# Patient Record
Sex: FEMALE | Race: WHITE | Employment: OTHER | URBAN - METROPOLITAN AREA
[De-identification: names, ages, dates, MRNs, and addresses within clinical notes are randomized per-mention and may not be internally consistent; named-entity substitution may affect disease eponyms.]

---

## 2020-07-27 ENCOUNTER — HOSPITAL ENCOUNTER (EMERGENCY)
Age: 75
Discharge: ANOTHER HOSPITAL | End: 2020-07-27
Attending: EMERGENCY MEDICINE
Payer: MEDICARE

## 2020-07-27 ENCOUNTER — APPOINTMENT (OUTPATIENT)
Dept: CT IMAGING | Age: 75
End: 2020-07-27
Payer: MEDICARE

## 2020-07-27 ENCOUNTER — APPOINTMENT (OUTPATIENT)
Dept: GENERAL RADIOLOGY | Age: 75
End: 2020-07-27
Payer: MEDICARE

## 2020-07-27 VITALS
OXYGEN SATURATION: 98 % | BODY MASS INDEX: 27.95 KG/M2 | TEMPERATURE: 98.1 F | RESPIRATION RATE: 9 BRPM | SYSTOLIC BLOOD PRESSURE: 179 MMHG | HEART RATE: 94 BPM | HEIGHT: 66 IN | DIASTOLIC BLOOD PRESSURE: 85 MMHG | WEIGHT: 173.9 LBS

## 2020-07-27 LAB
ALBUMIN SERPL-MCNC: 4.2 G/DL (ref 3.5–4.6)
ALP BLD-CCNC: 74 U/L (ref 40–130)
ALT SERPL-CCNC: 12 U/L (ref 0–33)
ANION GAP SERPL CALCULATED.3IONS-SCNC: 13 MEQ/L (ref 9–15)
AST SERPL-CCNC: 19 U/L (ref 0–35)
BILIRUB SERPL-MCNC: 0.4 MG/DL (ref 0.2–0.7)
BUN BLDV-MCNC: 13 MG/DL (ref 8–23)
CALCIUM SERPL-MCNC: 9.2 MG/DL (ref 8.5–9.9)
CHLORIDE BLD-SCNC: 104 MEQ/L (ref 95–107)
CO2: 25 MEQ/L (ref 20–31)
CREAT SERPL-MCNC: 0.47 MG/DL (ref 0.5–0.9)
EKG ATRIAL RATE: 80 BPM
EKG P AXIS: 33 DEGREES
EKG P-R INTERVAL: 150 MS
EKG Q-T INTERVAL: 386 MS
EKG QRS DURATION: 82 MS
EKG QTC CALCULATION (BAZETT): 445 MS
EKG R AXIS: -4 DEGREES
EKG T AXIS: 24 DEGREES
EKG VENTRICULAR RATE: 80 BPM
GFR AFRICAN AMERICAN: >60
GFR NON-AFRICAN AMERICAN: >60
GLOBULIN: 2.7 G/DL (ref 2.3–3.5)
GLUCOSE BLD-MCNC: 115 MG/DL (ref 70–99)
HCT VFR BLD CALC: 40.1 % (ref 37–47)
HEMOGLOBIN: 13.4 G/DL (ref 12–16)
INR BLD: 0.9
MCH RBC QN AUTO: 30.3 PG (ref 27–31.3)
MCHC RBC AUTO-ENTMCNC: 33.3 % (ref 33–37)
MCV RBC AUTO: 90.9 FL (ref 82–100)
PDW BLD-RTO: 13.4 % (ref 11.5–14.5)
PLATELET # BLD: 265 K/UL (ref 130–400)
POTASSIUM SERPL-SCNC: 3.6 MEQ/L (ref 3.4–4.9)
PROTHROMBIN TIME: 12.5 SEC (ref 12.3–14.9)
RBC # BLD: 4.41 M/UL (ref 4.2–5.4)
SODIUM BLD-SCNC: 142 MEQ/L (ref 135–144)
TOTAL PROTEIN: 6.9 G/DL (ref 6.3–8)
WBC # BLD: 6.4 K/UL (ref 4.8–10.8)

## 2020-07-27 PROCEDURE — 96374 THER/PROPH/DIAG INJ IV PUSH: CPT

## 2020-07-27 PROCEDURE — 85027 COMPLETE CBC AUTOMATED: CPT

## 2020-07-27 PROCEDURE — 96375 TX/PRO/DX INJ NEW DRUG ADDON: CPT

## 2020-07-27 PROCEDURE — 99291 CRITICAL CARE FIRST HOUR: CPT

## 2020-07-27 PROCEDURE — 93010 ELECTROCARDIOGRAM REPORT: CPT | Performed by: INTERNAL MEDICINE

## 2020-07-27 PROCEDURE — 93005 ELECTROCARDIOGRAM TRACING: CPT | Performed by: EMERGENCY MEDICINE

## 2020-07-27 PROCEDURE — 70450 CT HEAD/BRAIN W/O DYE: CPT

## 2020-07-27 PROCEDURE — 6360000002 HC RX W HCPCS: Performed by: EMERGENCY MEDICINE

## 2020-07-27 PROCEDURE — 36415 COLL VENOUS BLD VENIPUNCTURE: CPT

## 2020-07-27 PROCEDURE — 71045 X-RAY EXAM CHEST 1 VIEW: CPT

## 2020-07-27 PROCEDURE — 80053 COMPREHEN METABOLIC PANEL: CPT

## 2020-07-27 PROCEDURE — 85610 PROTHROMBIN TIME: CPT

## 2020-07-27 RX ORDER — FENTANYL CITRATE 50 UG/ML
50 INJECTION, SOLUTION INTRAMUSCULAR; INTRAVENOUS ONCE
Status: COMPLETED | OUTPATIENT
Start: 2020-07-27 | End: 2020-07-27

## 2020-07-27 RX ORDER — ONDANSETRON 2 MG/ML
4 INJECTION INTRAMUSCULAR; INTRAVENOUS ONCE
Status: COMPLETED | OUTPATIENT
Start: 2020-07-27 | End: 2020-07-27

## 2020-07-27 RX ADMIN — ONDANSETRON 4 MG: 2 INJECTION INTRAMUSCULAR; INTRAVENOUS at 08:37

## 2020-07-27 RX ADMIN — FENTANYL CITRATE 50 MCG: 50 INJECTION, SOLUTION INTRAMUSCULAR; INTRAVENOUS at 08:32

## 2020-07-27 ASSESSMENT — ENCOUNTER SYMPTOMS
SHORTNESS OF BREATH: 0
EYE DISCHARGE: 0
FACIAL SWELLING: 0
PHOTOPHOBIA: 0
RHINORRHEA: 0
ABDOMINAL DISTENTION: 0
VOMITING: 0
WHEEZING: 0
COLOR CHANGE: 0
ABDOMINAL PAIN: 0

## 2020-07-27 ASSESSMENT — PAIN SCALES - GENERAL: PAINLEVEL_OUTOF10: 10

## 2020-07-27 NOTE — ED NOTES
[facility] arrived to transport patient via helicopter  Ryan Sargent  at bedside with    All belongings given to Ryan Sargent  Only belongings going with patient is a ring on her left ring finger      Owen Mehta RN  07/27/20 1496

## 2020-07-27 NOTE — ED NOTES
Patient presents to the ER via  [address] after swimming this morning at the [facility] when she got out of the swimming pool she went blind and was unable to see. [facility] arrived and started to transport her to the ER when she started to become confused and was unable to give name and . Patient remains confused when she arrives to the ER. Patient is able to see shadowing and know that a bright light is being shined into her eyes. Patient is slow to respond and has trouble finding words. Patient is able to move exts but is unable to follow commands at times due to confusion. No slurred speech noted at time of triage.        Kay Ignacio, RN  [phone]

## 2020-07-27 NOTE — ED PROVIDER NOTES
[facility] ED  eMERGENCY dEPARTMENT eNCOUnter      Pt Name: Yulisa Pena  MRN: [id number]  [place] 1945  Date of evaluation: 7/27/2020  Provider: Kindra Santiago [address] [place] [address]       Chief Complaint   Patient presents with    Cerebrovascular Accident         HISTORY OF PRESENT ILLNESS   (Location/Symptom, Timing/Onset,Context/Setting, Quality, Duration, Modifying Factors, Severity)  Note limiting factors. Yulisa Pena is a 69 y.o. female who presents to the emergency department with a chief complaint of sudden visual loss after swimming with a friend at the [facility] center this morning upon climbing out of the pool. She has been exhibiting some confusion as well. She has no history of CVA. She denies headache, nausea, vomiting, or other complaints. She has no history of visual loss. HPI    NursingNotes were reviewed. REVIEW OF SYSTEMS    (2-9 systems for level 4, 10 or more for level 5)     Review of Systems   Constitutional: Negative for activity change and appetite change. HENT: Negative for congestion, facial swelling and rhinorrhea. Eyes: Positive for visual disturbance. Negative for photophobia and discharge. Respiratory: Negative for shortness of breath and wheezing. Cardiovascular: Negative for chest pain. Gastrointestinal: Negative for abdominal distention, abdominal pain and vomiting. Endocrine: Negative for polydipsia and polyphagia. Genitourinary: Negative for difficulty urinating, frequency, vaginal bleeding and vaginal discharge. Musculoskeletal: Negative for gait problem. Skin: Negative for color change. Allergic/Immunologic: Negative for immunocompromised state. Neurological: Negative for dizziness, weakness and light-headedness. Hematological: Negative for adenopathy. Psychiatric/Behavioral: Negative for behavioral problems. Except as noted above the remainder of the review of systems was reviewed and negative.        PAST MEDICAL HISTORY   No past medical history on file. SURGICALHISTORY     No past surgical history on file. CURRENT MEDICATIONS       Previous Medications    No medications on file       ALLERGIES     Patient has no known allergies. FAMILY HISTORY     No family history on file. SOCIAL HISTORY       Social History     Socioeconomic History    Marital status:      Spouse name: Not on file    Number of children: Not on file    Years of education: Not on file    Highest education level: Not on file   Occupational History    Not on file   Social Needs    Financial resource strain: Not on file    Food insecurity     Worry: Not on file     Inability: Not on file    Transportation needs     Medical: Not on file     Non-medical: Not on file   Tobacco Use    Smoking status: Not on file   Substance and Sexual Activity    Alcohol use: Not on file    Drug use: Not on file    Sexual activity: Not on file   Lifestyle    Physical activity     Days per week: Not on file     Minutes per session: Not on file    Stress: Not on file   Relationships    Social connections     Talks on phone: Not on file     Gets together: Not on file     Attends Druze service: Not on file     Active member of club or organization: Not on file     Attends meetings of clubs or organizations: Not on file     Relationship status: Not on file    Intimate partner violence     Fear of current or ex partner: Not on file     Emotionally abused: Not on file     Physically abused: Not on file     Forced sexual activity: Not on file   Other Topics Concern    Not on file   Social History Narrative    Not on file       SCREENINGS   NIH Stroke Scale  NIH Stroke Scale Assessed: Yes  Interval: Baseline  Level of Consciousness (1a. ): Alert  LOC Questions (1b. ):  Answers neither question correctly  LOC Commands (1c. ): Performs both tasks correctly  Best Gaze (2. ): Normal  Visual (3. ): (!) Bilateral hemianopia  Facial Palsy (4. ): is obviously frightened, but acting appropriately         DIAGNOSTIC RESULTS     EKG: All EKG's are interpreted by the Emergency Department Physician who either signs or Co-signsthis chart in the absence of a cardiologist.    Normal sinus rhythm at a normal rate of 80 bpm with no acute ST segment elevation or T wave inversion    RADIOLOGY:   Non-plain filmimages such as CT, Ultrasound and MRI are read by the radiologist. Plain radiographic images are visualized and preliminarily interpreted by the emergency physician with the below findings:    Patient has a large parieto-occipital bleed with a shift on CT per radiology/prelim read (Dr. Hay Bowers contacted us via telephone)    Interpretation per the Radiologist below, if available at the time ofthis note:    CT Head WO Contrast   Final Result      6.1 x 4.4 x 5.4 cm left posterior parietal and left occipital hemorrhage with surrounding vasogenic edema, with extension into left lateral ventricle. Mass effect and midline shift as discussed. Differential includes hemorrhagic infarct. Cannot exclude    underlying mass/malignancy. Prominence of tentorium suggests component of subarachnoid hemorrhage. Above findings discussed via telephone with Dr. Mandy Carpenter in the emergency department, at 8:04 AM, Monday, July 27, 2020. All CT scans at this facility use dose modulation, iterative reconstruction, and/or weight based dosing when appropriate to reduce radiation dose to as low as reasonably achievable.       XR CHEST PORTABLE    (Results Pending)     pcxr no acute findings per my read    ED BEDSIDE ULTRASOUND:   Performed by ED Physician - none    LABS:  Labs Reviewed   COMPREHENSIVE METABOLIC PANEL - Abnormal; Notable for the following components:       Result Value    Glucose 115 (*)     CREATININE 0.47 (*)     All other components within normal limits   CBC   PROTIME-INR       All other labs were within normal range or not returned as of this dictation. EMERGENCY DEPARTMENT COURSE and DIFFERENTIAL DIAGNOSIS/MDM:   Vitals:    Vitals:    07/27/20 0752 07/27/20 0810   BP: (!) 172/82 (!) 159/70   Pulse: 88 82   Resp: 14 14   Temp: 98.1 °F (36.7 °C)    TempSrc: Oral    SpO2: 99% 100%   Weight:  173 lb 14.4 oz (78.9 kg)   Height: 5' 6\" (1.676 m)      Dr. Valente Ivy is contacted upon patient's arrival and confirms that she is a tPA candidate. She is found to have a bleed on CT however and this is obviously not carried out. Dr. Valente Ivy is made aware and neurosurgeon is contacted. Per [facility] notes, patient takes a baby asa and synthroid. It is unknown when this was last taken. Serial neuro exams are performed and she remains awake and alert and currently unchanged. Her blood pressure is reasonable. Patient develops headache and is given fentanyl IV. Her  arrives at bedside and states that she hasn't taken asa in a long time. Dr. Andrea Sanchez does not have an operating room available until noon and asks for transfer, [facility] is contacted and she will fly. Serial neuro exams remain unchanged. MDM    CRITICAL CARE TIME   Total Critical Care time was 35 minutes, excluding separately reportableprocedures. There was a high probability of clinicallysignificant/life threatening deterioration in the patient's condition which required my urgent intervention. CONSULTS:  None    PROCEDURES:  Unless otherwise noted below, none     Procedures    FINAL IMPRESSION      1. Cerebrovascular accident (CVA), unspecified mechanism ([facility] [address]          DISPOSITION/PLAN   DISPOSITION        PATIENT REFERRED TO:  No follow-up provider specified.     DISCHARGE MEDICATIONS:  New Prescriptions    No medications on file          (Please note that portions of this note were completed with a voice recognition program.  Efforts were made to edit the dictations but occasionally words are mis-transcribed.)    Kris Sabillon,  (electronically signed)  Attending Emergency Physician

## 2020-07-27 NOTE — FLOWSHEET NOTE
All belongings including clothing and hair clips sent with  Delicia Gonzalez  One ring on left ring finger going with patient

## 2020-07-27 NOTE — PROGRESS NOTES
Spiritual Care Services     Summary of Visit:  Patient was being wheeled to CT at the time this  arrived. This  spoke with the ER doctor and was updated on patient. This  attempted to contact the patient's , but the call went to voice mail without identifying who the recipient was, so no message was left. Spiritual care to continue to follow the patient closely and make contact with . Spiritual Assessment/Intervention/Outcomes:    Encounter Summary  Services provided to[de-identified] Patient not available  Referral/Consult From[de-identified] Nursing Supervisor/Manager  Support System: Spouse, Friends/neighbors  Place of Holiness: Unknown  Continue Visiting: Yes     Crisis  Type: Stroke Alert, Code  Assessment: Coping  Intervention: Contacted support as requested per patient/family request  Who?:  Justyn Lamas  Why?: To inform   At Request Of: Doctor  Outcome: Did not respond                            Care Plan:    Continue to follow up with patient. Spiritual Care Services   Electronically signed by Khanh Escobar on 7/27/20 at 8:23 AM EDT     To reach a  for emotional and spiritual support, place an [facility] consult request.   If a  is needed immediately, dial 0 and ask to page the on-call .

## 2020-09-10 ENCOUNTER — HOSPITAL ENCOUNTER (OUTPATIENT)
Dept: SPEECH THERAPY | Age: 75
Setting detail: THERAPIES SERIES
Discharge: HOME | End: 2020-09-10
Payer: MEDICARE

## 2020-09-10 ENCOUNTER — HOSPITAL ENCOUNTER (OUTPATIENT)
Dept: OCCUPATIONAL THERAPY | Age: 75
Setting detail: THERAPIES SERIES
Discharge: HOME | End: 2020-09-10
Payer: MEDICARE

## 2020-09-10 PROCEDURE — 92523 SPEECH SOUND LANG COMPREHEN: CPT

## 2020-09-10 PROCEDURE — 97167 OT EVAL HIGH COMPLEX 60 MIN: CPT

## 2020-09-10 NOTE — PROGRESS NOTES
[x][facility]        [][facility] REHAB Dept       Outpatient Rehab Dept     [address] [place] [address] [address], [address]        [address] [facility], [address] [place] [address].     Phone: [phone]                   Phone: [phone]     Fax:  [phone]        Fax: [phone]      [facility]  Speech Language Pathology  Speech Language/Cognitive Evaluation        NAME:Nazanin Swain  : 1945 (75 y.o.)   MRN: [id number]  PATIENT DIAGNOSIS(ES): Alteration in cognition [R41.89]  Dysphagia, unspecified [R13.10]  Vision impairment [H54.7]  No chief complaint on file. There are no active problems to display for this patient. No past medical history on file. No past surgical history on file.   No Known Allergies    Date of Onset: 20  Ordering Physician: Dr. Silvia Herring    Date of Evaluation: 9/10/2020   Evaluating Therapist: ARRON Childers      SPEECH PATHOLOGY DIAGNOSIS:    Aphasia and Decreased Cognition    THERAPY RECOMMENDATIONS:   Therapy is recommended to improve:  Verbal expression  Auditory comprehension  Cognition                 MOTOR SPEECH    Oral Peripheral Examination   Adequate labial/lingual strength    Parameters of Speech Production  Respiration:    WFL    Articulation:    WFL    Resonance:    WFL    Quality:     WFL    Pitch:      WFL    Intensity:   WFL    Fluency:    Intact    Prosody   Intact       RECEPTIVE LANGUAGE    Comprehension of Yes/No Questions:             MTDDA subtest: 70% accuracy  Inconsistent  Perseveration    Process Simple Verbal Commands:   Incomplete  Inconsistent  Comment: 40% acc    Process Intermediate Verbal Commands:   Unable    Process Complex Verbal Commands:   Unable    Comprehension of Conversation:     Incomplete  Latent  Inconsistent  Comment: required mod-max cues to comprehend conversation    Comprehension of single words: 1. Pt will improve her Expressive Language Abilities to a phrase/sentence level for effective communication with familiar and unfamiliar communication partners so they may functionally communicate and express safety/medical concerns. 2. Pt will improve her Receptive Language abilities to a 2 step level for comprehension of conversation and safety directions with familiar and unfamiliar communication partners. Short Term Goals:  Pt to be seen 2x/wk for 6 weeks weeks. 1. Pt will answer mod. level yes/no questions with 80% accuracy with min cues to assist the caregiver in obtaining important information regarding the patient's personal, medical, and safety needs. 2.Pt will follow 1-2 step directions given orally with 70% accuracy with min cues to increase the pt's ability to follow directions provided by caregivers for safe follow through with ADLs. 3.Pt will complete confrontational naming tasks with 70% accuracy with mild verbal cues to help the patient express his/her basic wants and needs. 4. Pt will name 10/10 items in a concrete category with min verbal cues to promote semantic organization, neuroplasticity, and the efficiency of word finding for expression of the patient's wants, needs, feelings, and ideas. 5. Pt will be educated on word-finding strategies, and use them in structured and unstructured tasks in 60% of given opportunities with mild verbal cues to help the pt express his/her personal, safety, and medical needs in the presence of language deficits. 6. Pt will complete further cognitive testing as needed to guide POC      Prognosis for improvements:  Good family support/communication and motivation    Pain Assessment:  Initial Assessment:  Patient denies pain. Re-assessment:  Patient denies pain. Patient stated goals: To be as independent as possible     Education:  Patient. .  Reinforcement of recommendations is needed. Family member.  , who gives verbal understanding of

## 2020-09-10 NOTE — PROGRESS NOTES
[x] [address]:       [address] [place], [place] [address]Rahul Crane  Ph: [phone]   Fax: [phone] [] [address] [facility] [address]:  [address] [facility] [address] [facility], [address]   Ph: [phone]  Fax: [phone]       OCCUPATIONAL THERAPY EVALUATION     Evaluation Date:  9/10/2020      OT Individual Minutes  Time In: 3838  Time Out: 1505  Minutes: 60    Patient Name:Nazanin Stout   Gender: female   YOB: 1945         MRN: [id number]     Physician: Referring Practitioner: Dr. Sahra Feldman MD  Diagnosis: Diagnosis:    L intracranial hemorrhage, R weakness, R neglect, R vision impairment   Treating diagnosis: R29.898 Other symptoms and signs involving the musculoskeletal systems (decreased  strength, FM, coordination of RUE), R27.8 Other lack of coordination and R44.9 Unspecified symptoms and signs involving general sensations and perceptions                 Referral Date:  9/3/2020          Onset Date: Onset Date: 07/27/20    PMH:  There is no problem list on file for this patient.     Per Care everywhere, pt with the following PMH  Fourth cranial nerve palsy, right 06/22/2020   History of basal cell carcinoma (BCC) 06/16/2020   Abnormality of gait 02/26/2020   Gastroesophageal reflux disease without esophagitis 01/10/2020   Last Assessment & Plan:     Assessment: diet controlled      Primary osteoarthritis of left knee 04/18/2019   Primary osteoarthritis of both knees 07/05/2018   Basal cell carcinoma of eyebrow 12/13/2017   Combined forms of age-related cataract of left eye 04/03/2017   Status post corneal transplant- DSAEK OD 04/03/2017   Pseudophakia of right eye 01/18/2017   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 11/15/2016   Pinguecula 10/04/2016   Conjunctival pigmentations of right eye 10/04/2016   Fuchs' corneal dystrophy 07/28/2015   Posterior vitreous detachment of right eye 07/28/2015   Posterior vitreous detachment of left eye 07/28/2015   Inflamed seborrheic keratosis 05/19/2015   Stiffness of joint, not elsewhere classified, forearm 07/17/2014   Closed Colles' fracture 06/17/2014   Stiffness of joint, not elsewhere classified, hand 06/17/2014   Incontinence of urine 10/05/2012   Prolapse of female pelvic organs 10/05/2012   Vaginal ulceration 10/05/2012   Blepharochalasis 09/20/2010   Other psoriasis 05/04/2010   Actinic keratosis 05/04/2010   Unspecified Vitamin D Deficiency 11/24/2009   Contact dermatitis and other eczema, due to unspecified cause 01/13/2009   Other seborrheic keratosis 04/22/2008   Mixed hyperlipidemia 11/01/2005   Acquired hypothyroidism 10/30/2002   Last Assessment & Plan:     Assessment: controlled on PO synthroid. Most recent TSH: 9/16/2019 (0.169)     Disturbance of skin sensation        Per care everywhere,   Surgery Date Site/Laterality Comments   EXERCISE STRESS WO IMAGING 1/1/1999 - 12/31/1999   Normal.     REV UPPER EYELID W EXCESS SKIN 9/21/10 Bilateral  Bilateral Performed by Amharic Ohm at [facility] 1998 FAT PAD 9/21/10 Bilateral  Bilateral Performed by Amharic Ohm at [address]   Left left arm surgery, [facility] - Extremity Wrist     TUBAL LIGATION HX          CORNEAL TRNSPL, ENDOTHELIAL 11/10/2016 Right DSAEK (Endothelial Keratoplasty) OD by Dr. Lyndsey Yu 01/10/2017 Right Cataract Extraction with PC IOL OD by Dr. Vesna Luna 04/18/2019 Left Total left knee replacement, Arthroscopy     TOTAL KNEE REPLACEMENT 01/16/2020 Right Total Right Knee replacement, arthroscopy         No past medical history on file. No past surgical history on file. No Known Allergies   Bee allergies per pt report     Diagnostic imaging: Physician interpretation of imaging tests reviewed. Medications:  No current outpatient medications on file.  Pt has list. See chart for copy of or Pain/ Chief complaint:  Pt was swimming at [facility] center at this facility and had difficulties with vision, speaking, and getting out of pool. Pt taken to [facility], then transported to [facility] in [place]. Pt in ICU, had craniectomy, then transferred to acute rehab. Current Functional Limitations Per Patient Report:   Orientation: person   Communication: Pt with aphasia. Hearing: No concerns   Perception: NT on this date d/t time constraint    Vision:  visual field cut right side per pt and pt sister report             Feeding: Pt on dysphagia level 3 diet , family cutting food. Pt using hands to feed self. Grooming: Pt with set up with brushing teeth. Pt not able to comb hair. Sister helping wash pt face. Bathing: Pt able to wash upper body. Assist to wash/dry feet. UE dressing: Pt with Mod A, able to pull over trunk once BUE threaded. Pt able to doff IND'ly. LE dressing: Assist to thread underpants/pants. Pt able to pull up. Sister dons socks, pt able to doff. Sister ties shoes. Toileting: SBA using 3:1 commomde on main level. Toilet transfer: SBA with VC  Tub/Shower transfer: SBA with VC using shower chair    Cooking:  cooking cleaning now. Cleaning:  cooking and cleaning now. Laundry:  completing now. Sleep: No concerns reported     Medication management:      Patient goal for therapy: \"Do what I can. \"       OBSERVATION:   Pt donning helmet. No acute signs of distress. Pt in w/c. Pt with atypical movement pattern in RUE. OBJECTIVE FINDINGS    Hand Dominance: right, pt now using L hand for all activitis.         Upper Extremity Strength and Range of Motion      Right  Left    MMT A P Norm  A P MMT   Shoulder          Flexion 3+/5 110° NT 0-180 WFL NT 4/5   Abduction 3+/5 WFL NT 0-180 WFL NT 4/5   Internal Rotation NT/5 WFL NT 0-80 WFL NT NT/5   External Rotation NT/5 WFL NT 0-60 WFL NT NT/5   Elbow          Flexion 4/5 WFL NT 0-150 WFL NT 5/5 Extension 4/5 WFL -0 WFL NT 5/5   Pronation NT/5 WFL NT 0-80 WFL NT NT/5   Supination NT/5 WFL NT 0-80 WFL NT NT/5   Wrist          Flexion 4/5 WFL NT 0-70 WFL NT 5/5   Extension  4/5 WFL NT 0-60 WFL NT 5/5   Ulnar deviation NT/5 WFL NT 0-30 WFL NT NT/5   Radial Deviation  NT/5 WFL NT 0-20 WFL NT NT/5   Comments: Pt with increased time and effort with RUE to move through all planes.  Pt required verbal cues with visual demo        Hand Range of Motion      Right  Left   Normal  MP PIP DIP  MP PIP DIP    0-90 0-100 0-70  0-90 0-100 0-70    A P A P A P  A P A P A P   Index                Extension WFL NT WFL NT WFL NT  WFL NT WFL NT WFL NT   Flexion WFL NT WFL NT WFL NT  WFL NT WFL NT WFL NT   Middle                Extension WFL NT WFL NT WFL NT  WFL NT WFL NT WFL NT   Flexion WFL NT WFL NT WFL NT  WFL NT WFL NT WFL NT   Ring                Extension WFL NT WFL NT WFL NT  WFL NT WFL NT WFL NT   Flexion  WFL NT WFL NT WFL NT  WFL NT WFL NT WFL NT   Little                 Extension WFL NT WFL NT WFL NT  WFL NT WFL NT WFL NT   Flexion  WFL NT WFL NT WFL NT  WFL NT WFL NT WFL NT   Comments: Pt with difficulty with purposeful release, required increased time and effort        Right   Left Norms    A P  A P    Thumb         MP Flexion WFL NT  WFL NT 0-70   IP Flexion WFL NT  WFL NT 0-90   Radial Abduction WFL NT  WFL NT 0-50   Palmar Adduction WFL NT  WFL NT 0-40   Comments:    Opposition  Right Hand: WFL, increased time and effort   Left Hand: WFL    Distance Thumb Tip from Head of 5th MC: measurements cm or inches   Right Hand: 0, increaed time and effort to complete   Left Hand: 0    Edema: NT     & Pinch Strength  Average of 3 tries Right Norm Left Norm    (lb) 18.3 Female age 76-69: 55 lbs  39 Female age 76-69: 41 lbs    Arvizu Pinch (lb) 6.66 Female age 76-69: 9.5 lbs  8 Female age 76-69: 8.5 lbs    Lateral Pinch (lb) NT Female age 76-69: 11.0 lbs  NT Female age 76-69: 10.0 lbs    Comments: lateral pinch NT d/t time constraints on eval.     Coordination & Dexterity   Right Norm Left Norm   Nine Hole Peg Test  (seconds) NT Female age 76-69: 22.1 s  NT Female age 76-69: 22.0 s    Box & Blocks  (# of blocks) NT Female age 76-69: 74.5 NT Female age 76-69: 72.4   Comments: Pt unable to follow directions to complete 9 HPT on eval.     Skin Integrity  WFL    Cognition:    Pt with difficulty attending to task. Pt with difficulty with expressive and receptive communication. Sensation:     Impaired  Halbur-Hudson filament assessment completed (see chart below) Halbur-Hudson Monofilaments:      Right Left    Anterior Posterior  Anterior Posterior   Thumb 4.31 4.31 3.61 3.61   Index 4.31 4.31 3.61 3.61   Middle 4.31 4.31 3.61 3.61   Ring 4.31 4.31 3.61 3.61   Little 4.31 4.31 3.61 3.61   Palmar 4.31 4.31 3.61 3.61       Monofilament  Size Representation   Hand Threshold    2.83 Green Normal    3.61 Blue Diminished light touch   4.31 Purple  Diminished Protective Sensation   4.56 Red Loss of Protective   Sensation    6.65 Red Loss of Protective   Sensation    Comments:     Tone:   normal tone      Joint Mobility  WFL, increased effort with RUE for purposeful grasp and release    Palpation/Tenderness  None reported    Education/Barriers to learning:     Barriers:cognitive    Education on this date: OT role and POC     ASSESSMENT    Pt is a 69 y.o. female s/p  L intracranial hemorrhage, impairing ability to complete ADL's. Pt with the following deficit areas. Pt may benefit from OT services to address deficits and maximize level of function to complete ADL's.      Problems:  [] Decreased UE strength   [] Decreased UE ROM   [x] Decreased  strength   [x] Decreased fine motor skills   [] Increased pain    [x] Decreased ADL status   [] Decreased joint mobility   [] Decreased coordination   [x] Decreased sensation    [x] Other: decreased vision      Complexity:         [] Low Complexity:   ¨ History: Brief history including review of medical records relating to the problem  ¨ Exam: 1-3 performance Deficits  ¨ Assistance/Modification: No assistance or modifications required to perform tasks. No comorbities affecting occupational performance  [] Medium Complexity:   ¨ History: Expanded review of medical records and additional review of physical, cognitive, or psychosocial history related to current functional performance  ¨ Exam: 3-5 performance deficits  ¨ Assistance/Modification: Min/mod assistance or modifications required to perform tasks. May have comorbidities that affect occupational performance. [x] High Complexity:   ¨ History: Extensive review of medical records and additional review of physical, cognitive, or psychosocial history related to current functional performance. ¨ Exam: 5 or more performance deficits  ¨ Assistance/Modification: Significant assistance or modifications required to perform tasks. Have comorbidities that affect occupational performance.     AM-PAC  AM-PAC Daily Activity Inpatient   How much help for putting on and taking off regular lower body clothing?: A Lot  How much help for Bathing?: A Lot  How much help for Toileting?: A Little  How much help for putting on and taking off regular upper body clothing?: A Lot  How much help for taking care of personal grooming?: A Lot  How much help for eating meals?: A Little  AM-[facility] Inpatient Daily Activity Raw Score: 14  AM-PAC Inpatient ADL T-Scale Score : 33.39  ADL Inpatient CMS 0-100% Score: 59.67  ADL Inpatient CMS G-Code Modifier : CK         Rehabilitation Potential:    [] Excellent [x] Good  [] Fair  [] Poor      PLAN OF CARE     To see patient for 2 x/week for 12 weeks for the following treatment interventions:    [x] Evaluate & Treat [x] Neuromuscular Re-education:     [x] Re-evaluation [] Tissue (stress) Loading Program    [] Pain Management  [x] PROM/Stretching/AAROM/AROM    [] Edema Management  [] Splinting    [] Wound Care/Scar Management [] Desensitization    [x] ADL Training  [x] Strengthening/Graded Therapeutic Activity    [] Tendon Repair Program  [x] Coordination/Dexterity Training    [] Instruction/Application of energy [x] Manual Techniques        conservation, work simplification [x] Instruction in HEP        joint protection, body mechanics [] Aquatic Therapy    [] Modalities []  Ultrasound           [] Infrared [] Hot/Cold Pack:          [] Paraffin   [x] Other: visual compensatory techniques     [] Electrical Stimulation [] Fluidotherapy     [x] [place] able to work with pt   [x] D/C plan: Will assess pt after established visits to determine need for continued therapy. GOALS:     LTG 1 : Patient  will be Supervised with all recommended HEP's, adaptive strategies, and adaptive techniques. LTG 2 : Patient  will increase R  strength from current by 10 lbs to increase performance with I/ADL's. LTG 3 : Patient  will increase R pinch strength from current by 5 lbs to increase performance with I/ADL's. LTG 4 : Patient  will increase dexterity in R hand as observed by 9 hole peg test by completing WFL to increase performance with I/ADL's. LTG 5 : Patient  will increase RUE coordination as observed by box and blocks by completing WFL to increase performance with I/ADL's. LTG 6 : Patient will improve  RUE sensation and/or utilize compensatory techniques for safe completion of self-care as projected. LTG 7 : Patient will scan environment to the right side without verbal cues to increase safety and performance with functional activities. LTG 8 : Pt will complete clock drawing test for further assessment of visual perceptual skills. LTG 9: Pt will be IND using adaptive utensils to increase self-feeding skills.      LIAM Canchola/L 9/11/2020 11:11 AM    Falls Risk Assessment     Age: 0-59 = 0          60-69= 1            > 70= 2 History of Falls:   0  Falls  last 6 mo = 0    1  fall  Last  6 mo = 1   1-3 falls last 6 mo

## 2020-09-11 ENCOUNTER — HOSPITAL ENCOUNTER (OUTPATIENT)
Dept: PHYSICAL THERAPY | Age: 75
Setting detail: THERAPIES SERIES
Discharge: HOME | End: 2020-09-11
Payer: MEDICARE

## 2020-09-11 PROCEDURE — 97162 PT EVAL MOD COMPLEX 30 MIN: CPT

## 2020-09-11 NOTE — PROGRESS NOTES
[address]  PHYSICAL THERAPY EVALUATION    Date: 2020  Patient Name: John Velasco       MRN: [id number]   Account: [de-identified]   : 1945  (75 y.o.)   Gender: female   Referring Practitioner: Mariel German MD                 Diagnosis: L intracranial hemorrhage, right weakness  Treatment Diagnosis: impaired balance, impaired gait, decreased LE strength  Additional Pertinent Hx: bilateral knee replacements, vision problems (reading glasses before CVA), thyroid disease, GERD             Past Medical History:  has no past medical history on file. Past Surgical History:   has no past surgical history on file. Vital Signs  Patient Currently in Pain: No   Pain Screening  Patient Currently in Pain: No                Lives With: Spouse  Type of Home: House  Home Layout: Multi-level(7 steps up with HR, 5 steps down with HR)  Home Access: Stairs to enter with rails  Entrance Stairs - Number of Steps: 4  Entrance Stairs - Rails: Right  Home Equipment: Rolling walker;Standard walker  ADL Assistance: Independent(prior to CVA)  Ambulation Assistance: Independent(prior to CVA)  Transfer Assistance: Independent(prior to CVA)  Additional Comments: sister is helping out at home from [facility]; patient has two kids that plan to assist        Subjective:  Subjective: Patient had stroke 22 and was airlifted to [facility] and complete acute rehab at [facility] D/C 9/3/20. Denies any numbness or tingling. Patient has expressive aphasia and difficuly comprehending per sister. Patient uses ww around the house with assist from family.        Objective:   Sensation  Overall Sensation Status: WFL              Ambulation 1  Surface: carpet  Device: Rolling Walker  Assistance: Minimal assistance  Quality of Gait: right side neglect, diffculty maintaining straight pathway  Gait Deviations: Slow Sanaz, Decreased step length, Decreased step height  Distance: 50 ft        Transfers  Sit to Stand: Stand by assistance  Stand to sit: Stand by assistance  Bed to Chair: Contact guard assistance  Comment: patient need multiple cues to perform due to decreased understanding of tasks    Strength RLE  R Hip Flexion: 3+/5  R Hip Extension: 2+/5  R Hip ABduction: 3+/5  R Knee Flexion: 4/5  R Knee Extension: 4+/5  R Ankle Dorsiflexion: 4/5  Strength LLE  L Hip Flexion: 4/5  L Hip Extension: 3-/5  L Hip ABduction: 4-/5  L Knee Flexion: 4+/5  L Knee Extension: 5/5  L Ankle Dorsiflexion: 5/5        Strength Other  Other: Decreased core strength based off functional mobility. PROM RLE (degrees)  RLE PROM: WFL     PROM LLE (degrees)  LLE PROM: WFL                        Observation/Palpation  Posture: Fair(rounded shoulders)  Observation: wears helmet  Bed mobility  Rolling to Left: Supervision  Rolling to Right: Supervision  Supine to Sit: Supervision  Sit to Supine: Supervision          Additional Measures  Other: Duffy balance: 19/56         Exercises:   Exercises  Exercise 1: test steps NV might needs second person for safety*  Exercise 2: SLR*  Exercise 3: hip abduction with band, hip adduction with ball*  Exercise 4: bridging*  Exercise 5: sit to stands with proper hand placement*  Exercise 6: gait training*  Exercise 7: static balance*  Exercise 8: ambulation in // bars, gait drills*    *Indicates exercise,modality, or manual techniques to be initiated when appropriate  Assessment: Body structures, Functions, Activity limitations: Decreased functional mobility , Decreased strength, Decreased safe awareness, Decreased endurance, Decreased cognition, Decreased balance, Decreased coordination, Decreased posture  Assessment: Patient s/p CVA on7/27/20 with right sided weakness and difficulty with mobility. Upon PT evaluation, patient demonstrates decreased LE strength R>L with impaired safety awareness due to difficulty following commands.   Further PT recommended to improve mobility and strength for overall quality of life.  Prognosis: Good  Discharge Recommendations: Continue to assess pending progress        Decision Making: Medium Complexity  History: bilateral knee replacements, vision problems (reading glasses before CVA), thyroid disease, GERD  Exam: impaired balance, impaired gait, decreased LE strength  Clinical Presentation: evolving  Type of devices: All fall risk precautions in place     Plan  Frequency/Duration:  Plan  Times per week: 2  Plan weeks: 8  Current Treatment Recommendations: Strengthening, Functional Mobility Training, Transfer Training, Endurance Training, Gait Training, Stair training, Neuromuscular Re-education, Manual Therapy - Soft Tissue Mobilization, Manual Therapy - Joint Manipulation, Home Exercise Program, Safety Education & Training, Patient/Caregiver Education & Training, Equipment Evaluation, Education, & procurement, Modalities         Patient Education  New Education Provided: PT Education: Goals;PT Role;Plan of Care;Home Exercise Program    POST-PAIN     Pain Rating (0-10 pain scale):   0/10  Location and pain description same as pre-treatment unless indicated. Action: [] NA  [] Call Physician  [] Perform HEP  [] Meds as prescribed    Evaluation and patient rights have been reviewed and patient agrees with plan of care. Yes  [x]  No  []   Explain:       Saul Fall Risk Assessment  Risk Factor Scale  Score   History of Falls [] Yes  [x] No 25  0 0   Secondary Diagnosis [] Yes  [x] No 15  0 0   Ambulatory Aid [] Furniture  [x] Crutches/cane/walker  [] None/bedrest/wheelchair/nurse 30  15  0 15   IV/Heparin Lock [] Yes  [x] No 20  0 0   Gait/Transferring [x] Impaired  [] Weak  [] Normal/bedrest/immobile 20  10  0 20   Mental Status [x] Forgets limitations  [] Oriented to own ability 15  0 15      Total: 50     Based on the Assessment score: check the appropriate box.   []  No intervention needed   Low =   Score of 0-24  []  Use standard prevention interventions Moderate =  Score of 24-44   [] Discuss fall prevention strategies   [] Indicate moderate falls risk on eval  [x]  Use high risk prevention interventions High = Score of 45 and higher   [x] Discuss fall prevention strategies   [x] Provide supervision during treatment time    Goals  Short term goals  Time Frame for Short term goals: 4 weeks  Short term goal 1: Patient will be independent with bed mobility. Short term goal 2: Patient will be independent with transfers. Short term goal 3: Patient will be independent with HEP. Long term goals  Time Frame for Long term goals : 8 weeks  Long term goal 1: Patient will increase strength in bilateral LEs >/= 4+/5 for improved ambulation and transfers. Long term goal 2: Patient will ambulate with ww 200 ft independently with improved bilateral symmetry. Long term goal 3: Patient will ascend/descend 12 steps using one HR with supervision. Long term goal 4: Duffy balance >/= 45/56 to demonstrate improved static balance.          PT Individual Minutes  Time In: 1300  Time Out: 1400  Minutes: 60  Timed Code Treatment Minutes: 0 Minutes  Procedure Minutes:60 PT evaluation minutes   Timed Activity Minutes Units   Ther Ex 0    Manual  0        Electronically signed by Domingo Newton, PT on 9/11/20 at 5:17 PM EDT

## 2020-09-11 NOTE — PLAN OF CARE
[address]     Ph: [phone]  Fax: [phone]    [] Certification  [] Recertification [x]  Plan of Care  [] Progress Note [] Discharge      To: Mirian Tomlinson MD      From:  Ariana Drew, SAVITA  Patient: Deepika Brown     : 1945  Diagnosis: L intracranial hemorrhage, right weakness     Date: 2020  Treatment Diagnosis: impaired balance, impaired gait, decreased LE strength    Plan of Care/Certification Expiration Date: 20  Progress Report Period from:  2020  to 2020    Total # of Visits to Date: 1   No Show: 0    Canceled Appointment: 0     OBJECTIVE:   Short Term Goals - Time Frame for Short term goals: 4 weeks    Goals Current/Discharge status  Met   Short term goal 1: Patient will be independent with bed mobility. Bed mobility  Rolling to Left: Supervision  Rolling to Right: Supervision  Supine to Sit: Supervision  Sit to Supine: Supervision           [] yes  [x] no   Short term goal 2: Patient will be independent with transfers. Transfers  Sit to Stand: Stand by assistance  Stand to sit: Stand by assistance  Bed to Chair: Contact guard assistance  Comment: patient need multiple cues to perform due to decreased understanding of tasks   [] yes  [x] no   Short term goal 3: Patient will be independent with HEP. Patient needs HEP. [] yes  [x] no     Long Term Goals - Time Frame for Long term goals : 8 weeks  Goals Current/ Discharge status Met   Long term goal 1: Patient will increase strength in bilateral LEs >/= 4+/5 for improved ambulation and transfers.  Strength RLE  R Hip Flexion: 3+/5  R Hip Extension: 2+/5  R Hip ABduction: 3+/5  R Knee Flexion: 4/5  R Knee Extension: 4+/5  R Ankle Dorsiflexion: 4/5  Strength LLE  L Hip Flexion: 4/5  L Hip Extension: 3-/5  L Hip ABduction: 4-/5  L Knee Flexion: 4+/5  L Knee Extension: 5/5  L Ankle Dorsiflexion: 5/5        Strength Other  Other: Decreased core strength based off functional mobility. [] yes  [x] no   Long term goal 2: Patient will ambulate with ww 200 ft independently with improved bilateral symmetry. Ambulation 1  Surface: carpet  Device: Rolling Walker  Assistance: Minimal assistance  Quality of Gait: right side neglect, diffculty maintaining straight pathway  Gait Deviations: Slow Sanaz, Decreased step length, Decreased step height  Distance: 50 ft    [] yes  [x] no   Long term goal 3: Patient will ascend/descend 12 steps using one HR with supervision. NT due to time constraints [] yes  [x] no   Long term goal 4: Duffy balance >/= 45/56 to demonstrate improved static balance. Outcomes Measures:  Duffy Balance Score: 19        [] yes  [x] no       Body structures, Functions, Activity limitations: Decreased functional mobility , Decreased strength, Decreased safe awareness, Decreased endurance, Decreased cognition, Decreased balance, Decreased coordination, Decreased posture  Assessment: Patient s/p CVA on7/27/20 with right sided weakness and difficulty with mobility. Upon PT evaluation, patient demonstrates decreased LE strength R>L with impaired safety awareness due to difficulty following commands. Further PT recommended to improve mobility and strength for overall quality of life.   Prognosis: Good  Discharge Recommendations: Continue to assess pending progress      PT Education: Goals;PT Role;Plan of Care;Home Exercise Program    PLAN: [x] Evaluate and Treat  Frequency/Duration:  Plan  Times per week: 2  Plan weeks: 8  Current Treatment Recommendations: Strengthening, Functional Mobility Training, Transfer Training, Endurance Training, Gait Training, Stair training, Neuromuscular Re-education, Manual Therapy - Soft Tissue Mobilization, Manual Therapy - Joint Manipulation, Home Exercise Program, Safety Education & Training, Patient/Caregiver Education & Training, Equipment Evaluation, Education, & procurement, Modalities     Precautions:                            Patient Status:[x] Continue/ Initiate plan of Care    [] Discharge PT. Recommend pt continue with HEP. [] Additional visits requested, Please re-certify for additional visits:          Signature: Electronically signed by Juana Brown PT on 9/11/20 at 5:19 PM EDT      If you have any questions or concerns, please don't hesitate to call. Thank you for your referral.    I have reviewed this plan of care and certify a need for medically necessary rehabilitation services.     Physician Signature:__________________________________________________________  Date:  Please sign and return

## 2020-09-14 ENCOUNTER — HOSPITAL ENCOUNTER (OUTPATIENT)
Dept: PHYSICAL THERAPY | Age: 75
Setting detail: THERAPIES SERIES
Discharge: HOME | End: 2020-09-14
Payer: MEDICARE

## 2020-09-14 ENCOUNTER — APPOINTMENT (OUTPATIENT)
Dept: OCCUPATIONAL THERAPY | Age: 75
End: 2020-09-14
Payer: MEDICARE

## 2020-09-14 ENCOUNTER — APPOINTMENT (OUTPATIENT)
Dept: PHYSICAL THERAPY | Age: 75
End: 2020-09-14
Payer: MEDICARE

## 2020-09-14 PROCEDURE — 97116 GAIT TRAINING THERAPY: CPT

## 2020-09-14 PROCEDURE — 97110 THERAPEUTIC EXERCISES: CPT

## 2020-09-14 PROCEDURE — 97112 NEUROMUSCULAR REEDUCATION: CPT

## 2020-09-14 NOTE — PROGRESS NOTES
[id number] [address]  Outpatient Physical Therapy    Treatment Note        Date: 2020  Patient: Klaudia Bentley  : 1945  ACCT #: [de-identified]  Referring Practitioner: Tara Ba MD  Diagnosis: L intracranial hemorrhage, right weakness    Visit Information:  PT Visit Information  Onset Date: 20  PT Insurance Information: Medicare; [employer]  Total # of Visits Approved: (BMN, [place] 30 visits)  Total # of Visits to Date: 2  Plan of Care/Certification Expiration Date: 20  No Show: 0  Progress Note Due Date: 10/09/20  Canceled Appointment: 0  Progress Note Counter:  (PN due 10/9/20)    Subjective:  reports pt has inconsistent use of Rt hand. 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 HEP Compliance:  Initiated today.      Vital Signs  Patient Currently in Pain: No   Pain Screening  Patient Currently in Pain: No    OBJECTIVE:   Exercises  Exercise 1: test steps NV might needs second person for safety*  Exercise 2: 2way SLR Jay x10 ( Min A for control of Rt LE  Exercise 4: Bridges 3''x10  Exercise 5: STS from mat- VCs for hand placement/technique x5  Exercise 7: Static stands: FA/FT 30sec ea, modified tandem 25'' jay  Exercise 9: Clams Jay 3'' x10  Exercise 10: Rt heel slides (Min A) x10 in supine  Exercise 20: HEP: static stands, bridges, SLR                   Ambulation 1  Surface: carpet  Device: Rolling Walker  Assistance: Minimal assistance  Quality of Gait: Difficulty grasping Rt UE on [address], decreased management of [address]  Gait Deviations: Slow Sanaz, Decreased step length, Decreased step height  Distance: 40ft    Ambulation 2  Surface - 2: carpet  Device 2: Large Ap Weir  Assistance 2: Minimal assistance, Moderate assistance  Quality of Gait 2: Decreased Rt step length, Min A for sequencing and placement of Quad cane  Distance: 8ft             *Indicates exercise, modality, or manual techniques to be initiated when Re-education, Manual Therapy - Soft Tissue Mobilization, Manual Therapy - Joint Manipulation, Home Exercise Program, Safety Education & Training, Patient/Caregiver Education & Training, Equipment Evaluation, Education, & procurement, Modalities     Pt to continue current HEP. See objective section for any therapeutic exercise changes, additions or modifications this date.          PT Individual Minutes  Time In: 1301  Time Out: 1400  Minutes: 59  Timed Code Treatment Minutes: 58 Minutes  Procedure Minutes:0     Timed Activity Minutes Units   Ther Ex 30 2   Neuro 18 1   Gait 10 1       Signature:  Electronically signed by Xavi Saravia PTA on 9/14/20 at 12:52 PM EDT

## 2020-09-15 ENCOUNTER — HOSPITAL ENCOUNTER (OUTPATIENT)
Dept: SPEECH THERAPY | Age: 75
Setting detail: THERAPIES SERIES
Discharge: HOME | End: 2020-09-15
Payer: MEDICARE

## 2020-09-15 ENCOUNTER — HOSPITAL ENCOUNTER (OUTPATIENT)
Dept: OCCUPATIONAL THERAPY | Age: 75
Setting detail: THERAPIES SERIES
Discharge: HOME | End: 2020-09-15
Payer: MEDICARE

## 2020-09-15 ENCOUNTER — APPOINTMENT (OUTPATIENT)
Dept: SPEECH THERAPY | Age: 75
End: 2020-09-15
Payer: MEDICARE

## 2020-09-15 PROCEDURE — 92507 TX SP LANG VOICE COMM INDIV: CPT

## 2020-09-15 PROCEDURE — 97530 THERAPEUTIC ACTIVITIES: CPT

## 2020-09-15 NOTE — PROGRESS NOTES
[facility]  Speech Language Pathology  Adult Daily Note    Hawa Yepez  : 1945    Date: 9/15/2020    Visit Information:  SLP Insurance Information: Medicare  Total # of Visits Approved: 35  Total # of Visits to Date: 1            Plan of care signed (Y/N): Faxed    Certification Period: 9/10/20-10/10/20  Plan of Care Visit # 1      Interventions used this date:  Expressive Language, Receptive Language and Instruction in Compensatory Strategies    Subjective:  Pt was accompanied to therapy by  Orlin Causey this date. Behavior:  Alert and Cooperative       Objective/Assessment:   Patient progressing towards goals:  1. Pt will answer mod. level yes/no questions with 80% accuracy with min cues to assist the caregiver in obtaining important information regarding the patient's personal, medical, and safety needs. Pt answered simple-mod level yes/no questions with 50% acc independently and increased to 70% acc mod assist  2. Pt will follow 1-2 step directions given orally with 70% accuracy with min cues to increase the pt's ability to follow directions provided by caregivers for safe follow through with ADLs. 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 Pt will complete confrontational naming tasks with 70% accuracy with mild verbal cues to help the patient express his/her basic wants and needs. Named items from [facility] with 53% acc independently and increased to 84% with min-mod phonemic cues. Named function of objects from [facility] with 69% acc independently and increased to 90% acc with mod cues  4.  Pt will name 10/10 items in a concrete category with min verbal cues to promote semantic organization, neuroplasticity, and the efficiency of word finding for expression of the patient's wants, needs, feelings, and ideas. 5. Pt will be educated on word-finding strategies, and use them in structured and unstructured tasks in 60% of given opportunities with mild verbal cues to help the pt express his/her personal, safety, and medical needs in the presence of language deficits. Patient and spouse educated on use of description (circumlocution) to increase communication. Pt began to initiate use of circumlocution strategy independently during confrontation naming task 2x. 6. Pt will complete further cognitive testing as needed to guide POC        Pain Assessment:  Initial Assessment:  Patient denies pain. Re-assessment:  Patient denies pain. Plan:  Continue with current goals    Patient/Caregiver Education:  Patient/Caregiver educated on session. Caregiver observed session.   Patient/Caregiver provided with home program: SLP gave 2 -step simple oral directions and yes/no question worksheet    Time in: 1000  Time out:1045  Minutes seen: 45      Signature:   Electronically signed by ARRON Garcia on 9/15/2020 at 10:52 AM

## 2020-09-15 NOTE — PROGRESS NOTES
increased ability to retrieve pegs and place into board with Min A (pt at times with correction for colors). Education/HEP: hand strengthening: Patient issued pink foam block. , Pt completed 10 reps to demo understanding. Pt with fair follow through. Pt with Mod verbal cues and tactile cues to assist with placement of foam block in hand. Pt completed power grasp, thumb flexion, tip pinch, and lateral pinch. Written hand out(s) provided. Discussed previous HEP: n/a    Assessment:   Pt tolerated treatment fair. Pt with difficulty understanding directions. Pt becoming frustrated during session when attempting to educate about scanning techniques. Pt able to be redirected. with verbal encouragment and rewording explanations. Plan:   Continue POC    Goals:     Long term goals  Time Frame for Long term goals : 2 x/week for 12 weeks  Long term goal 1: Patient  will be Supervised with all recommended HEP's, adaptive strategies, and adaptive techniques. Long term goal 2: Patient  will increase R  strength from current by 10 lbs to increase performance with I/ADL's. Long term goal 3: Patient  will increase R pinch strength from current by 5 lbs to increase performance with I/ADL's. Long term goal 4: Patient  will increase dexterity in R hand as observed by 9 hole peg test by completing WFL to increase performance with I/ADL's. Long term goal 5: Patient  will increase RUE coordination as observed by box and blocks by completing WFL to increase performance with I/ADL's. Long term goals 6: Patient will improve  RUE sensation and/or utilize compensatory techniques for safe completion of self-care as projected. Long term goal 7: Patient will scan environment to the right side without verbal cues to increase safety and performance with functional activities. Long term goal 8: Pt will complete clock drawing test for further assessment of visual perceptual skills.   Long term goal 9: Pt will be IND using adaptive utensils to increase self-feeding skills.     Darlyn Cabot, OTR/L   9/15/2020  11:53 AM

## 2020-09-17 ENCOUNTER — HOSPITAL ENCOUNTER (OUTPATIENT)
Dept: SPEECH THERAPY | Age: 75
Setting detail: THERAPIES SERIES
Discharge: HOME | End: 2020-09-17
Payer: MEDICARE

## 2020-09-17 ENCOUNTER — HOSPITAL ENCOUNTER (OUTPATIENT)
Dept: PHYSICAL THERAPY | Age: 75
Setting detail: THERAPIES SERIES
Discharge: HOME | End: 2020-09-17
Payer: MEDICARE

## 2020-09-17 PROCEDURE — 92507 TX SP LANG VOICE COMM INDIV: CPT

## 2020-09-17 PROCEDURE — 97110 THERAPEUTIC EXERCISES: CPT

## 2020-09-17 PROCEDURE — 97116 GAIT TRAINING THERAPY: CPT

## 2020-09-17 PROCEDURE — 97112 NEUROMUSCULAR REEDUCATION: CPT

## 2020-09-17 NOTE — PROGRESS NOTES
[facility] [id number] [facility]  FUNCTIONAL COMMUNICATION MEASURES  ADULT    Patient: Cassius Luna  : 1945  MRN: [id number]    Date: 2020   [facility] Record Number: [id number]      TYPE OF ENTRANCE:   Initial      SPOKEN LANGUAGE EXPRESSION  Rating: 3          Electronically Signed by: Ramirez Brooks MA, CCC-SLP

## 2020-09-17 NOTE — PROGRESS NOTES
[address] [address]  Outpatient Physical Therapy    Treatment Note        Date: 2020  Patient: Deepika Brown  : 1945  ACCT #: [de-identified]  Referring Practitioner: Mirian Tomlinson MD  Diagnosis: L intracranial hemorrhage, right weakness    Visit Information:  PT Visit Information  Onset Date: 20  PT Insurance Information: Medicare; [address] [employer]  Total # of Visits Approved: (BMN, [place] 30 visits)  Total # of Visits to Date: 3  Plan of Care/Certification Expiration Date: 20  No Show: 0  Progress Note Due Date: 10/09/20  Canceled Appointment: 0  Progress Note Counter: 3/16 (PN due 10/9/20)    Subjective: Sister present for session. States pt's bed may be too soft to complete HEP. Discussed couch or bed, advised not to complete on the floor. HEP Compliance:  [x] Good [] Fair [] Poor [] Reports not doing due to:    Vital Signs  Patient Currently in Pain: No   Pain Screening  Patient Currently in Pain: No    OBJECTIVE:   Exercises  Exercise 2: 2way SLR Heriberto x10 ( Min A for control of Rt LE  Exercise 3: hip abduction MRE, hip adduction with ball x10  Exercise 4: Bridges 3''x10  Exercise 7: Static stands: FA/FT 30sec ea, EC 10sec x2  Exercise 8: Gait training 1 UE support in // bars: Fwd/Lat with emphasis on step length/ foot clearance  Exercise 9: Clams Heriberto 3'' x10  Exercise 10: Rt heel slides (Min A) x10 in supine  Exercise 11: Standing weightshifts laterally  Exercise 12: Single stepping- attempted over S/C (decreased clearance) x10 Fwd/ Lat Heriberto                   Ambulation 1  Surface: carpet  Device: Rolling Walker  Assistance:  Moderate assistance  Quality of Gait: Inconsistent Heriberto foot clearance/step length, poor management of [employer] with VCs to stay inside base of [employer].  Gait Deviations: Slow Sanaz, Decreased step length, Decreased step height  Distance: 40ft                   *Indicates exercise, modality, or manual techniques to be initiated when appropriate    Assessment: Body structures, Functions, Activity limitations: Decreased functional mobility , Decreased strength, Decreased safe awareness, Decreased endurance, Decreased cognition, Decreased balance, Decreased coordination, Decreased posture  Assessment: Initiated gait drills and stepping activities with good tolerance. Pt demo's inconsistent Rt foot clearance with gait and stepping. Trialed stepping over cane however pt madan's difficulty possibly due to vision defieicits. Pt reports seeing double to triple at times which may be affecting gait. Education on importance of tending to Rt side as she has increased Rt side neglect. Treatment Diagnosis: impaired balance, impaired gait, decreased LE strength  Prognosis: Good       Goals:  Short term goals  Time Frame for Short term goals: 4 weeks  Short term goal 1: Patient will be independent with bed mobility. Short term goal 2: Patient will be independent with transfers. Short term goal 3: Patient will be independent with HEP. Long term goals  Time Frame for Long term goals : 8 weeks  Long term goal 1: Patient will increase strength in bilateral LEs >/= 4+/5 for improved ambulation and transfers. Long term goal 2: Patient will ambulate with ww 200 ft independently with improved bilateral symmetry. Long term goal 3: Patient will ascend/descend 12 steps using one HR with supervision. Long term goal 4: Duffy balance >/= 45/56 to demonstrate improved static balance. Progress toward goals: Balance, gait    POST-PAIN       Pain Rating (0-10 pain scale):   0/10   Location and pain description same as pre-treatment unless indicated.    Action: [x] NA   [] Perform HEP  [] Meds as prescribed  [] Modalities as prescribed   [] Call Physician     Frequency/Duration:  Plan  Times per week: 2  Plan weeks: 8  Current Treatment Recommendations: Strengthening, Functional Mobility Training, Transfer Training, Endurance Training, Gait Training, Stair training, Neuromuscular Re-education, Manual Therapy - Soft Tissue Mobilization, Manual Therapy - Joint Manipulation, Home Exercise Program, Safety Education & Training, Patient/Caregiver Education & Training, Equipment Evaluation, Education, & procurement, Modalities     Pt to continue current HEP. See objective section for any therapeutic exercise changes, additions or modifications this date.          PT Individual Minutes  Time In: 1101  Time Out: 1200  Minutes: 59  Timed Code Treatment Minutes: 59 Minutes  Procedure Minutes:0     Timed Activity Minutes Units   Ther Ex 29 2   Gait 10 1   Neuro 20 1       Signature:  Electronically signed by Melecio Plascencia PTA on 9/17/20 at 12:16 PM EDT

## 2020-09-17 NOTE — PROGRESS NOTES
[facility]  Speech Language Pathology  Adult Daily Note    Dom Interiano  : 1945    Date: 2020    Visit Information:  SLP Insurance Information: Medicare  Total # of Visits Approved: 35  Total # of Visits to Date: 2            Plan of care signed (Y/N):     Yes    Certification Period: 9/10/20-10/10/20  Plan of Care Visit # 2      Interventions used this date:  Expressive Language, Receptive Language and Instruction in Compensatory Strategies    Subjective:  Pt was accompanied to therapy by sister Sharron Coughlin this date. Pt's sister was unaware if homework was completed that had been previously sent home with . Behavior:  Alert and Cooperative       Objective/Assessment:   Patient progressing towards goals:  1. Pt will answer mod. level yes/no questions with 80% accuracy with min cues to assist the caregiver in obtaining important information regarding the patient's personal, medical, and safety needs. Pt answered simple-mod level yes/no questions with 90% acc independently  2. Pt will follow 1-2 step directions given orally with 70% accuracy with min cues to increase the pt's ability to follow directions provided by caregivers for safe follow through with ADLs. Pt followed simple-mod. Complex oral 2-step directions with common objects with 80% acc with mod verbal cues  3. Pt will complete confrontational naming tasks with 70% accuracy with mild verbal cues to help the patient express his/her basic wants and needs. Pt completed simple naming by description task with 76% acc with mod verbal cues and use of binary choices  4. Pt will name 10/10 items in a concrete category with min verbal cues to promote semantic organization, neuroplasticity, and the efficiency of word finding for expression of the patient's wants, needs, feelings, and ideas. Pt named 3 items in concrete category with max verbal cues.   5. Pt will be educated on word-finding strategies, and use them in structured and unstructured tasks in 60% of given opportunities with mild verbal cues to help the pt express his/her personal, safety, and medical needs in the presence of language deficits. Pt verbalized confusion this date as to the purpose of the activities in speech therapy. SLP explained reasoning for speech therapy and encouraged patient to continue working even though it is confusing. 6. Pt will complete further cognitive testing as needed to guide POC        Pain Assessment:  Initial Assessment:  Patient denies pain. Re-assessment:  Patient denies pain. Plan:  Continue with current goals    Patient/Caregiver Education:  Patient/Caregiver educated on session. Caregiver observed session. Patient/Caregiver provided with home program: SLP gave concrete category naming task.     Time in: 1005  Time out:1050  Minutes seen: 39      Signature:  Electronically signed by ARRON Lira on 9/17/2020 at 10:56 AM

## 2020-09-22 ENCOUNTER — HOSPITAL ENCOUNTER (OUTPATIENT)
Dept: PHYSICAL THERAPY | Age: 75
Setting detail: THERAPIES SERIES
Discharge: HOME | End: 2020-09-22
Payer: MEDICARE

## 2020-09-22 ENCOUNTER — HOSPITAL ENCOUNTER (OUTPATIENT)
Dept: SPEECH THERAPY | Age: 75
Setting detail: THERAPIES SERIES
Discharge: HOME | End: 2020-09-22
Payer: MEDICARE

## 2020-09-22 PROCEDURE — 92507 TX SP LANG VOICE COMM INDIV: CPT

## 2020-09-22 PROCEDURE — 97112 NEUROMUSCULAR REEDUCATION: CPT

## 2020-09-22 PROCEDURE — 97116 GAIT TRAINING THERAPY: CPT

## 2020-09-22 PROCEDURE — 97110 THERAPEUTIC EXERCISES: CPT

## 2020-09-22 NOTE — PROGRESS NOTES
[facility]  Speech Language Pathology  Adult Daily Note    Sarabjit Triana  : 1945    Date: 2020    Visit Information:  SLP Insurance Information: Medicare  Total # of Visits Approved: 35  Total # of Visits to Date: 3            Plan of care signed (Y/N):     Yes    Certification Period: 9/10/20-10/10/20  Plan of Care Visit # 3      Interventions used this date:  Expressive Language, Receptive Language and Instruction in Compensatory Strategies    Subjective:  Pt was accompanied to therapy by sister Quincy Holcomb this date. Pt's sister said they practiced naming items in category and patient had difficulty with task, but benefited when given choices. Sister reported difficulty communicating with patient when she becomes increasingly fatigued. Increased difficulty with more complex concepts. SLP encouraged use of gestures and introduced the idea AAC, but educated that there might be difficulty secondary to visual impairments. Behavior:  Alert and Cooperative       Objective/Assessment:   Patient progressing towards goals:  1. Pt will answer mod. level yes/no questions with 80% accuracy with min cues to assist the caregiver in obtaining important information regarding the patient's personal, medical, and safety needs. Not addressed   2. Pt will follow 1-2 step directions given orally with 70% accuracy with min cues to increase the pt's ability to follow directions provided by caregivers for safe follow through with ADLs. Not addressed   3. Pt will complete confrontational naming tasks with 70% accuracy with mild verbal cues to help the patient express his/her basic wants and needs. Pt completed simple naming of concrete and abstract pictured objects with 78% acc with mod verbal cues  4.  Pt will name 10/10 items in a concrete category with min verbal cues to promote semantic organization, neuroplasticity, and the efficiency of word finding for expression of the patient's wants, needs, feelings, and ideas. Not addressed   5. Pt will be educated on word-finding strategies, and use them in structured and unstructured tasks in 60% of given opportunities with mild verbal cues to help the pt express his/her personal, safety, and medical needs in the presence of language deficits. SLP educated sister on communication strategies: giving choices, first sound of words, complete sentences, getting confirmation before moving on and use of gestures if possible. Discussed possible use of AAC to assist communication. 6. Pt will complete further cognitive testing as needed to guide POC    SLP trialed Response Elaboration Treatment with patient this date. Patient was able to verbalize sentence about action picture without phonemic errors 50% acc with mod verbal cues and use of RET cueing hierarchy. Plan to add RET to treatment plan after baseline measures are taken including average content words. Pain Assessment:  Initial Assessment:  Patient denies pain. Re-assessment:  Patient denies pain. Plan:  Continue with current goals    Patient/Caregiver Education:  Patient/Caregiver educated on session. Caregiver observed session. Patient/Caregiver provided with home program: SLP gave name of iPad kyle to work on naming at home [address] [place] [address]. Given naming by description worksheet.      Time in: 1000  Time ABS:0458  Minutes seen: 52      Signature:  Electronically signed by ARRON De Los Santos on 9/22/2020 at 11:31 AM

## 2020-09-24 ENCOUNTER — HOSPITAL ENCOUNTER (OUTPATIENT)
Dept: OCCUPATIONAL THERAPY | Age: 75
Setting detail: THERAPIES SERIES
Discharge: HOME | End: 2020-09-24
Payer: MEDICARE

## 2020-09-24 PROCEDURE — 97530 THERAPEUTIC ACTIVITIES: CPT

## 2020-09-24 NOTE — PROGRESS NOTES
picking up blocks and max difficulty getting them in a stack, requiring the therapist to hold the lower blocks in the stack to keep from falling over. Patient required max verbal cues to scan to right side for colored blocks to complete stacks. Patient required increased time for this task. While picking up blocks, patient had difficulty picking up blocks with palmar  and instead used a palmar grasp with mod difficulty. Patient instructed to use 1 lb resistive clip to  blocks and put them back in bin, however this activity frustrated the patient due to having to continue to use so many blocks. Activity was changed to using right hand to  1, 2, and 4lb resistive clips and place them on bars. Patient required mod verbal cues to scan to find all of the clips. Patient had mod difficulty retrieving the clips and placing them on the bars. Patient's sister reported patient only using left hand to eat, stating it might be due to gripping utensil as well as just attending to right side. Therapist showed patient and sister built up utensils with patient demonstrating use of fork with built up spoon and bringing it to mouth. Therapist provided patient's sister with pink tube to build up utensils at home and encouraging use of right hand to feed self. Education/HEP: Addition of towel table top exercises as described above. Handout was provided. Discussed previous HEP: yes, Patient's sister verbally confirmed compliance with HEP. Comments:     Assessment:   Pt tolerated treatment fair. Patient became frustrated at end of session stating she was tired of using the blocks so much and having to complete repetitive tasks. Patient redirected to new task with resistive clips to finish session with patient decreasing frustration and completing task.      Plan:   Continue POC    Goals:     Long term goals  Time Frame for Long term goals : 2 x/week for 12 weeks  Long term goal 1: Patient  will

## 2020-09-29 ENCOUNTER — HOSPITAL ENCOUNTER (OUTPATIENT)
Dept: PHYSICAL THERAPY | Age: 75
Setting detail: THERAPIES SERIES
Discharge: HOME | End: 2020-09-29
Payer: MEDICARE

## 2020-09-29 ENCOUNTER — HOSPITAL ENCOUNTER (OUTPATIENT)
Dept: SPEECH THERAPY | Age: 75
Setting detail: THERAPIES SERIES
Discharge: HOME | End: 2020-09-29
Payer: MEDICARE

## 2020-09-29 PROCEDURE — 97110 THERAPEUTIC EXERCISES: CPT

## 2020-09-29 PROCEDURE — 97112 NEUROMUSCULAR REEDUCATION: CPT

## 2020-09-29 PROCEDURE — 92507 TX SP LANG VOICE COMM INDIV: CPT

## 2020-09-29 PROCEDURE — 97116 GAIT TRAINING THERAPY: CPT

## 2020-09-29 NOTE — PROGRESS NOTES
deficits.  said that they are using the iPad kyle to work on language and is finding it very helpful. Asked for a list of more language apps to practice at home. 6. Pt will complete further cognitive testing as needed to guide POC    Completed RET task. Baseline data: Step 1 picture probe: average of 2.7 content words and then step 6 picture probe: average of 7 content words. Add goal to POC:   Pt will increase average content words in sentence to 5 words independently using picture probe to increase functional communication of wants/needs and expand utterance length. Pain Assessment:  Initial Assessment:  Patient denies pain. Re-assessment:  Patient denies pain. Plan:  Continue with current goals    Patient/Caregiver Education:  Patient/Caregiver educated on session. Caregiver observed session.   Patient/Caregiver provided with home program:     Time in: 1000  Time out:1045  Minutes seen: 45      Signature:  Electronically signed by ARRON Ybarra on 9/29/2020 at 10:59 AM

## 2020-09-29 NOTE — PROGRESS NOTES
[address] [address]  Outpatient Physical Therapy    Treatment Note        Date: 2020  Patient: Silvia Burton  : 1945  ACCT #: [de-identified]  Referring Practitioner: Tonja Bach MD  Diagnosis: L intracranial hemorrhage, right weakness    Visit Information:  PT Visit Information  Onset Date: 20  PT Insurance Information: Medicare; [employer]  [facility]: [facility] [place] [facility]  Total # of Visits to Date: 5  Plan of Care/Certification Expiration Date: 20  No Show: 0  Canceled Appointment: 0  Progress Note Counter:  (PN due 10/9/20)    Subjective: Pt states \"Same as always, nothing new. \" Pts  present stating \"It'a good day. \"     HEP Compliance:  [x] Good [] Fair [] Poor [] Reports not doing due to:    Vital Signs  Patient Currently in Pain: No   Pain Screening  Patient Currently in Pain: No    OBJECTIVE:   Exercises  Exercise 2: 2way SLR Heriberto x10 ( occ Min A for control of Rt LE)  Exercise 3: hip abduction MRE x15, hip adduction with ball x15  Exercise 4: Bridges 3''x15  Exercise 6: gait training (see ambulation for details) 20'x2 (to/from stairs, limited by destination)  Exercise 7: static standing balance EO/EC on foam  Exercise 8: gait training in // bars using one UE (verbal cues to improve step length and improve AMY)  Exercise 9: Clams Heriberto 3'' x15  Exercise 11: static standing weightshifting lateral x 10 without UE support  Exercise 13: toe taps 4'' step x 15, toe taps 6'' step x 3 inc difficulty clearing foot (using bilateral UE support)  Exercise 14: hamstring stretch seated with step 30s x 3 on right (w/ strap pulling foot into DF to intensify stretch as tolerated)  Exercise 15: Stairs 4-6\"x1, B HR support, CGA/min w/ +2 present for SBA/safety only, recip pattern  Exercise 20: HEP: cont current     Ambulation 1  Surface: carpet  Device: Rolling Walker  Assistance: Contact guard assistance  Quality of Gait: ataxic gait pattern, increase strength in bilateral LEs >/= 4+/5 for improved ambulation and transfers. Long term goal 2: Patient will ambulate with ww 200 ft independently with improved bilateral symmetry. Long term goal 3: Patient will ascend/descend 12 steps using one HR with supervision. Long term goal 4: Duffy balance >/= 45/56 to demonstrate improved static balance. Progress toward goals: balance, strength, gait, stair ability     POST-PAIN       Pain Rating (0-10 pain scale):   0/10   Location and pain description same as pre-treatment unless indicated. Action: [x] NA   [] Perform HEP  [] Meds as prescribed  [] Modalities as prescribed   [] Call Physician     Frequency/Duration:  Plan  Times per week: 2  Plan weeks: 8  Current Treatment Recommendations: Strengthening, Functional Mobility Training, Transfer Training, Endurance Training, Gait Training, Stair training, Neuromuscular Re-education, Manual Therapy - Soft Tissue Mobilization, Manual Therapy - Joint Manipulation, Home Exercise Program, Safety Education & Training, Patient/Caregiver Education & Training, Equipment Evaluation, Education, & procurement, Modalities     Pt to continue current HEP. See objective section for any therapeutic exercise changes, additions or modifications this date.      PT Individual Minutes  Time In: 0054  Time Out: 1200  Minutes: 58  Timed Code Treatment Minutes: 58 Minutes  Procedure Minutes: N/A      Timed Activity Minutes Units   Ther Ex 33 2   Gt  15 1   Neuro 10 1       Signature:  Electronically signed by Cl Becerra PTA on 9/29/20 at 1:28 PM EDT

## 2020-10-01 ENCOUNTER — HOSPITAL ENCOUNTER (OUTPATIENT)
Dept: OCCUPATIONAL THERAPY | Age: 75
Setting detail: THERAPIES SERIES
Discharge: HOME | End: 2020-10-01
Payer: MEDICARE

## 2020-10-01 ENCOUNTER — HOSPITAL ENCOUNTER (OUTPATIENT)
Dept: SPEECH THERAPY | Age: 75
Setting detail: THERAPIES SERIES
Discharge: HOME | End: 2020-10-01
Payer: MEDICARE

## 2020-10-01 PROCEDURE — 92507 TX SP LANG VOICE COMM INDIV: CPT

## 2020-10-01 PROCEDURE — 97530 THERAPEUTIC ACTIVITIES: CPT

## 2020-10-01 NOTE — PROGRESS NOTES
task with 40% acc independently and increased to 100% acc with mod verbal cues  5. Pt will be educated on word-finding strategies, and use them in structured and unstructured tasks in 60% of given opportunities with mild verbal cues to help the pt express his/her personal, safety, and medical needs in the presence of language deficits.  said that they are using the iPad kyle to work on language and is finding it very helpful. Asked for a list of more language apps to practice at home. 6. Pt will complete further cognitive testing as needed to guide POC     7. Pt will increase average content words in sentence to 5 words independently using picture probe to increase functional communication of wants/needs and expand utterance length. Pain Assessment:  Initial Assessment:  Patient denies pain. Re-assessment:  Patient denies pain. Plan:  Continue with current goals    Patient/Caregiver Education:  Patient/Caregiver educated on session. Caregiver observed session.   Patient/Caregiver provided with home program: Provided  with list of language therapy apps, encouraged to review    Time in: 1000  Time out:1045  Minutes seen: 39      Signature:  Electronically signed by ARRON Cyr on 10/1/2020 at 10:42 AM

## 2020-10-01 NOTE — PROGRESS NOTES
Occupational Therapy  Daily Note     Name: Kra Bartholomew  : 1945  MRN: [id number]  Diagnosis:    L intracranial hemorrhage, R weakness, R neglect, R vision impairment    Visit Information:   Onset Date: 20  OT Insurance Information: Medicare  Total # of Visits Approved: 34(35 Visits OT, Max (1 used))  Total # of Visits to Date: 5  Progress Note Counter: 3    Date: 10/1/2020  Time:   OT Therapeutic activities 55 minutes for 4 unit(s), CPT 44622       OT Individual Minutes  Time In: 0900  Time Out: 9826  Minutes: 54    Referring Practitioner: Dr. Luba Woodard MD              Subjective: Patient's  attended appointment with patient today. Patient's  reports he feels like there have been some positive changes.  reported patient is attempted to use her right hand more for eating. Pain rating:   Pre-treatment pain:    0/10    Action for pain:   No action necessary. Pain after treatment:      0/10         Focus of treatment was on the following:   Coordination, fine motor/dexterity, visual motor, visual scanning, visual/perceptual     Objective:    Treatment Activity:   Patient began to engage in visual scanning activity of visual cancellation to cancel out all \"A\"'s on paper with variety of letters, but had trouble seeing the letters. Patient tried wearing 's reading glasses, but became frustrated with the task. Therapist used plain paper to cover up rows not in use and to encourage scanning from left to right, rather than top to bottom, but it was still too difficult for the patient at this time. Patient engaged in pinch strengthening, fine motor, and visual perceptual task with clothes pins. Patient used right hand to  10 clothes pins on left side and clip them to vertical yard stick. Patient then had clothes pins on right side and continued to use right hand to pick them up and clip on the yard stick.  Patient required mod verbal cues for scanning to the right side to  clothes pins. Patient had mod difficulty with some of the clothes pins and manipulating them to open them. Patient required mod verbal cues to turn the clothes pins and try the other side when it was not opening for her. With the vertical yard stick on the ride side, patient crossed midline with left hand to remove clothes from yard stick and place back on the table. Patient required min verbal cues to continuing looking to right side to remove all clothes pins. Patient engaged in fine motor coordination and visual perceptual task with peg board. With pegs placed on right side, patient used right hand to  pegs of a specific color for each row and place in them peg board. Patient required multiple explanations of how to complete task, as patient was having difficulty understanding and following the directions. Patient had mod difficulty picking up pegs with right hand and finding the correct color. Patient had max difficulty seeing the pegs on the right side closer to her body on the table, but only mod difficulty finding pegs on right side further from the body, which requires less scanning. Patient completed two rows of different colors and then became frustrated with tasks. Therapist modified task to include small beads. Patient had to  the small beads with her right hand and place them in the pegs that had holes in the top (some pegs had solid tops). Patient had mod difficulty picking up beads with right hand. Patient then removed pegs and beads from peg board with left hand and then crossed midline to place the pegs back into bin. Patient required multiple explanations for how to complete task. Once patient understood, Patient had min difficulty with this task. Discussed previous HEP: yes, Patient and patient's  reported they are completing the HEPs to some extent at home. Patient reports difficulty with foam block.      Comments:     Assessment:   Pt tolerated treatment fair. Patient became frustrated during tasks when she feels like there is too much repetition stating she's already done that enough. Patient required encourage to complete tasks and redirection. Tasks were altered part way through some activities to help reduce frustration, but continue to work towards goals. Plan:   Continue POC    Goals:     Long term goals  Time Frame for Long term goals : 2 x/week for 12 weeks  Long term goal 1: Patient will be Supervised with all recommended HEP's, adaptive strategies, and adaptive techniques. Long term goal 2: Patient will increase R  strength from current by 10 lbs to increase performance with I/ADL's. Long term goal 3: Patient will increase R pinch strength from current by 5 lbs to increase performance with I/ADL's. Long term goal 4: Patient will increase dexterity in R hand as observed by 9 hole peg test by completing WFL to increase performance with I/ADL's. Long term goal 5: Patient will increase RUE coordination as observed by box and blocks by completing WFL to increase performance with I/ADL's. Long term goals 6: Patient will improve  RUE sensation and/or utilize compensatory techniques for safe completion of self-care as projected. Long term goal 7: Patient will scan environment to the right side without verbal cues to increase safety and performance with functional activities. Long term goal 8: Pt will complete clock drawing test for further assessment of visual perceptual skills. Long term goal 9: Pt will be IND using adaptive utensils to increase self-feeding skills.     Signature: Electronically signed by Trenna Shone, OTR/L on 10/1/2020 at 10:58 AM

## 2020-10-02 ENCOUNTER — HOSPITAL ENCOUNTER (OUTPATIENT)
Dept: OCCUPATIONAL THERAPY | Age: 75
Setting detail: THERAPIES SERIES
Discharge: HOME | End: 2020-10-02
Payer: MEDICARE

## 2020-10-02 ENCOUNTER — HOSPITAL ENCOUNTER (OUTPATIENT)
Dept: PHYSICAL THERAPY | Age: 75
Setting detail: THERAPIES SERIES
Discharge: HOME | End: 2020-10-02
Payer: MEDICARE

## 2020-10-02 PROCEDURE — 97112 NEUROMUSCULAR REEDUCATION: CPT

## 2020-10-02 PROCEDURE — 97530 THERAPEUTIC ACTIVITIES: CPT

## 2020-10-02 PROCEDURE — 97116 GAIT TRAINING THERAPY: CPT

## 2020-10-02 PROCEDURE — 97110 THERAPEUTIC EXERCISES: CPT

## 2020-10-02 NOTE — PROGRESS NOTES
Occupational Therapy  Daily Note     Name: Yudi Baird  : 1945  MRN: [id number]  Diagnosis:    L intracranial hemorrhage, R weakness, R neglect, R vision impairment    Visit Information:   Onset Date: 20  OT Insurance Information: Medicare  Total # of Visits Approved: 34(35 Visits OT, Max (1 used))  Total # of Visits to Date: 6  Progress Note Counter: 4    Date: 10/2/2020  OT Therapeutic activities 60 minutes for 4 unit(s), CPT 69736     OT Individual Minutes  Time In: 1300  Time Out: 1400  Minutes: 61    Referring Practitioner: Dr. Armani Holland MD    Subjective:  Pt with . Pain rating:   Pre-treatment pain:    Pt denies pain    Action for pain:   No action necessary. Pain after treatment:      Pt denies pain         Focus of treatment was on the following:   coordination, endurance, strengthening  right , visual motor and attention to R side     Objective:    Treatment Activity:     Pt removed cones at midline and placed off to R side with Mod verbal cues and with tactile cues to reach out as far as able with RUE. Pt then retrieved cones from R side and cross midline to place back on to platform. Pt with tactile cues to place forearm in neutral position and set up hand in cylindrical position. Pt often attempting to rake to obtain cones. Pt with VC to scan to look for cones on R side. Pt with increased effort to flex shoulder as cones stacked higher. Pt with tactile cues to use second tower on platform. Pt denied pain, required increased time and cues to complete task. Exercises:  Pt completed the following BTE exercises: #122 at 1 lbs for bilateral coordination/elbow and wrist flexion/extension. Pt completed for 2 minutes to set initial goal..  # 162 at 0 lbs for right hand  strength. Pt completed for 2 minutes to set initial goal..  # 162 at 0 lbs for right hand pinch strength. d/c, pt not able to maintain palmar pinch on tool. Unable to complete.  .  # 601 at 0 lbs for supination/pronation right UE. Pt completed for 2 minutes to set initial goal..      Pt stated at home, if not using R hand, hand begins to have a contracture. Recommended orthotic device, but unable to fabricate on this date as pt reported during last 20 min of session. Discussed previous HEP: yes, Pt stated \"occassionaly\" doing HEP. Report of easily fatigued at home. Assessment:   Pt tolerated treatment fair. Pt with verbal/tactile cues to attend to R side and scan to R. Assist to normalize RUE movement patterns. Plan:   Continue POC. Pt may benefit from resting hand orthosis. Discuss custom fabrication vs pre-doris orthosis and fabricate/assist to obtain. Goals:     Long term goals  Time Frame for Long term goals : 2 x/week for 12 weeks  Long term goal 1: Patient will be Supervised with all recommended HEP's, adaptive strategies, and adaptive techniques. Long term goal 2: Patient will increase R  strength from current by 10 lbs to increase performance with I/ADL's. Long term goal 3: Patient will increase R pinch strength from current by 5 lbs to increase performance with I/ADL's. Long term goal 4: Patient will increase dexterity in R hand as observed by 9 hole peg test by completing WFL to increase performance with I/ADL's. Long term goal 5: Patient will increase RUE coordination as observed by box and blocks by completing WFL to increase performance with I/ADL's. Long term goals 6: Patient will improve  RUE sensation and/or utilize compensatory techniques for safe completion of self-care as projected. Long term goal 7: Patient will scan environment to the right side without verbal cues to increase safety and performance with functional activities. Long term goal 8: Pt will complete clock drawing test for further assessment of visual perceptual skills. Long term goal 9: Pt will be IND using adaptive utensils to increase self-feeding skills.     Juan North, OTR/L   10/2/2020 2:29 PM

## 2020-10-02 NOTE — PROGRESS NOTES
[address] [address]  Outpatient Physical Therapy    Treatment Note        Date: 10/2/2020  Patient: Frank Cagle  : 1945  ACCT #: [de-identified]  Referring Practitioner: Nuris Esquivel MD  Diagnosis: L intracranial hemorrhage, right weakness    Visit Information:  PT Visit Information  Onset Date: 20  PT Insurance Information: Medicare; [employer]  Total # of Visits [facility]ved: ([facility] [place]  Total # of Visits to Date: 6  Plan of Care/Certification Expiration Date: 20  No Show: 0  Canceled Appointment: 0  Progress Note Counter:  (PN due 10/9/20)    Subjective: Patient reports exercises are going well.      HEP Compliance:  [x] Good [] Fair [] Poor [] Reports not doing due to:    Vital Signs  Patient Currently in Pain: No   Pain Screening  Patient Currently in Pain: No    OBJECTIVE:   Exercises  Exercise 2: 2way SLR Heriberto x10 ( occ Min A for control of Rt LE)  Exercise 3: hip abduction YTB x15, hip adduction with ball x15  Exercise 4: Bridges 3''x15  Exercise 7: static standing balance EO, EC without UE support; marching with bilateral UE support on ww  Exercise 8: gait training in // bars using one UE; sidestepping in // bars with one UE support  Exercise 9: Clams Heriberto 3'' x 20  Exercise 11: static standing weightshifting lateral and A/P x 10 without UE support  Exercise 12: single steps over Tband x 20 forward with bilateral UE assist  Exercise 13: toe taps 6'' step x 20 with bilateral UE support  Exercise 14: hamstring stretch seated with step 30s x 3 on 6'' step  Exercise 20: HEP: continue with current       Ambulation 1  Surface: carpet  Device: Rolling Walker  Assistance: Contact guard assistance, Minimal assistance  Quality of Gait: ataxic gait pattern, NBOS, difficulty maintaining straight pathway  Gait Deviations: Slow Sanaz, Decreased step length, Decreased step height  Distance: 60 ft, 20ft       Strength: [x] NT  [] MMT completed: Mobility Training, Transfer Training, Endurance Training, Gait Training, Stair training, Neuromuscular Re-education, Manual Therapy - Soft Tissue Mobilization, Manual Therapy - Joint Manipulation, Home Exercise Program, Safety Education & Training, Patient/Caregiver Education & Training, Equipment Evaluation, Education, & procurement, Modalities     Pt to continue current HEP. See objective section for any therapeutic exercise changes, additions or modifications this date.          PT Individual Minutes  Time In: 8912  Time Out: 1501  Minutes: 53  Timed Code Treatment Minutes: 53 Minutes  Procedure Minutes: 0 minutes     Timed Activity Minutes Units   Ther Ex  20  2   Gait  15  1   Neuro re-ed  18  1       Signature:  Electronically signed by Areli Recinos PT on 10/2/20 at 4:39 PM EDT

## 2020-10-06 ENCOUNTER — HOSPITAL ENCOUNTER (OUTPATIENT)
Dept: PHYSICAL THERAPY | Age: 75
Setting detail: THERAPIES SERIES
Discharge: HOME | End: 2020-10-06
Payer: MEDICARE

## 2020-10-06 ENCOUNTER — HOSPITAL ENCOUNTER (OUTPATIENT)
Dept: SPEECH THERAPY | Age: 75
Setting detail: THERAPIES SERIES
Discharge: HOME | End: 2020-10-06
Payer: MEDICARE

## 2020-10-06 PROCEDURE — 97116 GAIT TRAINING THERAPY: CPT

## 2020-10-06 PROCEDURE — 92507 TX SP LANG VOICE COMM INDIV: CPT

## 2020-10-06 PROCEDURE — 97110 THERAPEUTIC EXERCISES: CPT

## 2020-10-06 NOTE — PROGRESS NOTES
[facility]  Speech Language Pathology  Adult Daily Note    Fabiana Valadez  : 1945    Date: 10/6/2020    Visit Information:  SLP Insurance Information: Medicare  Total # of Visits Approved: 35  Total # of Visits to Date: 6            Plan of care signed (Y/N):     Yes    Certification Period: 9/10/20-10/10/20  Plan of Care Visit # 6      Interventions used this date:  Expressive Language and Instruction in Compensatory Strategies    Subjective:  Pt was accompanied to therapy by .  said that speech therapy is very frustrating for her because of how difficult it is, but over the weekend during a moment of frustration she came out with a sentence that was probably 17 words long.  reported that she has a friend that is going to start sitting with her and working on activities and was wanting to come observe if possible.  would like to start probing reading during sessions. Behavior:  Alert and Cooperative       Objective/Assessment:   Patient progressing towards goals:  1. Pt will answer mod. level yes/no questions with 80% accuracy with min cues to assist the caregiver in obtaining important information regarding the patient's personal, medical, and safety needs. Answered mod. Complex yes/no questions with 70% acc min-mod verbal cues   2. Pt will follow 1-2 step directions given orally with 70% accuracy with min cues to increase the pt's ability to follow directions provided by caregivers for safe follow through with ADLs. Pt followed simple 2-step directions with body parts with 2/5 acc and mod-max verbal and visual cues. Followed 2-step directions with common objects with 3/3 acc mod-max cues. 3.Pt will complete confrontational naming tasks with 70% accuracy with mild verbal cues to help the patient express his/her basic wants and needs.    Patient named low frequency items with 60% acc mod verbal cues and SLP cued patient to use circumlocution when possible   4. Pt will name 10/10 items in a concrete category with min verbal cues to promote semantic organization, neuroplasticity, and the efficiency of word finding for expression of the patient's wants, needs, feelings, and ideas. Pt named 8/10 items in concrete category with mod verbal cues. Pt showed increased confusion with task this date. 5. Pt will be educated on word-finding strategies, and use them in structured and unstructured tasks in 60% of given opportunities with mild verbal cues to help the pt express his/her personal, safety, and medical needs in the presence of language deficits. Educated on the use of circumlocution and description   6. Pt will complete further cognitive testing as needed to guide POC     7. Pt will increase average content words in sentence to 5 words independently using picture probe to increase functional communication of wants/needs and expand utterance length. Response Elaboration Training (RET) was completed to target improved word retrieval and utterance length in conversation. With open-ended action picture stimuli, the patient initially formed sentences with 5.4 content words on average (minimum: 1 word, maximum: 14 words for Step 1). After SLP intervention which included shaping, modeling, reinforcing and eliciting elaboration, the patient was able to form sentences with 7.7 content words on average (minimum: 7 words, maximum: 10 words for Step 6). Over 7 trials today, the quality of responses were noted to include more descriptive content. Pain Assessment:  Initial Assessment:  Patient denies pain. Re-assessment:  Patient denies pain. Plan:  Continue with current goals    Patient/Caregiver Education:  Patient/Caregiver educated on session. Caregiver observed session.   Patient/Caregiver provided with home program:     Time in: 1000  Time out:1045  Minutes seen: 45      Signature:  Electronically signed by ARRON Becker on 10/6/2020 at 11:17 AM

## 2020-10-06 NOTE — PROGRESS NOTES
[address] [address]  Outpatient Physical Therapy    Treatment Note        Date: 10/6/2020  Patient: Otilio Diaz  : 1945  ACCT #: [de-identified]  Referring Practitioner: Umm Meraz MD  Diagnosis: L intracranial hemorrhage, right weakness    Visit Information:  PT Visit Information  Onset Date: 20  PT Insurance Information: Medicare; [id number] [employer]  Total # of Visits Approved: ([facility])  Total # of Visits to Date: 7  Plan of Care/Certification Expiration Date: 20  No Show: 0  Canceled Appointment: 0  Progress Note Counter:  (PN due 10/9/20)    Subjective: pt reports she has no pain. HEP Compliance:  [x] Good [] Fair [] Poor [] Reports not doing due to:    Vital Signs  Patient Currently in Pain: Denies   Pain Screening  Patient Currently in Pain: Denies    OBJECTIVE:   Exercises  Exercise 2: 2way SLR Heriberto x10 ( occ Min A for control of Rt LE)  Exercise 3: hip abduction YTB x15, hip adduction with ball x15  Exercise 4: Bridges x15  Exercise 7: static standing balance, NBOS EC with one UE support x15sec, semi-tandem EC with one UE support; x30sec  Exercise 8: gait training in // bars using one UE; sidestepping, fwd x2 laps ; retro x1 lap  Exercise 9: Clams Heriberto  x 15  Exercise 14: hamstring stretch seated with step 20s x 3 on 6'' step  Exercise 16: foot taps on 6in step seated with Jena x10 ; placing foot in and out of 6in step with Jena x10    Strength: [x] NT  [] MMT completed:    ROM: [x] NT  [] ROM measurements:    *Indicates exercise, modality, or manual techniques to be initiated when appropriate    Assessment: Body structures, Functions, Activity limitations: Decreased functional mobility , Decreased strength, Decreased safe awareness, Decreased endurance, Decreased cognition, Decreased balance, Decreased coordination, Decreased posture  Assessment: Pt seemed to be confused requiring  multiple cues to perform tasks.  Pt demo's unsteadiness with gait and balance activities requiring one UE support and some Jean to maintain balance at times. Pt required vc's to incr step height when performing gait drills with good carryover noted. Good ROM and strength noted with supine and sidelying ex's. Treatment Diagnosis: impaired balance, impaired gait, decreased LE strength  Goals:  Short term goals  Time Frame for Short term goals: 4 weeks  Short term goal 1: Patient will be independent with bed mobility. Short term goal 2: Patient will be independent with transfers. Short term goal 3: Patient will be independent with HEP. Long term goals  Time Frame for Long term goals : 8 weeks  Long term goal 1: Patient will increase strength in bilateral LEs >/= 4+/5 for improved ambulation and transfers. Long term goal 2: Patient will ambulate with ww 200 ft independently with improved bilateral symmetry. Long term goal 3: Patient will ascend/descend 12 steps using one HR with supervision. Long term goal 4: Duffy balance >/= 45/56 to demonstrate improved static balance. Progress toward goals: incr strength    POST-PAIN       Pain Rating (0-10 pain scale):  0 /10   Location and pain description same as pre-treatment unless indicated. Action: [] NA   [x] Perform HEP  [] Meds as prescribed  [] Modalities as prescribed   [] Call Physician     Frequency/Duration:  Plan  Times per week: 2  Plan weeks: 8  Current Treatment Recommendations: Strengthening, Functional Mobility Training, Transfer Training, Endurance Training, Gait Training, Stair training, Neuromuscular Re-education, Manual Therapy - Soft Tissue Mobilization, Manual Therapy - Joint Manipulation, Home Exercise Program, Safety Education & Training, Patient/Caregiver Education & Training, Equipment Evaluation, Education, & procurement, Modalities     Pt to continue current HEP. See objective section for any therapeutic exercise changes, additions or modifications this date.     PT Individual Minutes  Time In: 1055  Time Out: 1148  Minutes: 53  Timed Code Treatment Minutes: 53 Minutes  Procedure Minutes:0     Timed Activity Minutes Units   Ther Ex 28 2   Gait 25 2       Signature:  Electronically signed by Tona Padilla PTA on 10/6/20 at 10:53 AM EDT

## 2020-10-08 ENCOUNTER — HOSPITAL ENCOUNTER (OUTPATIENT)
Dept: OCCUPATIONAL THERAPY | Age: 75
Setting detail: THERAPIES SERIES
Discharge: HOME | End: 2020-10-08
Payer: MEDICARE

## 2020-10-08 ENCOUNTER — HOSPITAL ENCOUNTER (OUTPATIENT)
Dept: SPEECH THERAPY | Age: 75
Setting detail: THERAPIES SERIES
Discharge: HOME | End: 2020-10-08
Payer: MEDICARE

## 2020-10-08 PROCEDURE — 92507 TX SP LANG VOICE COMM INDIV: CPT

## 2020-10-08 PROCEDURE — 97530 THERAPEUTIC ACTIVITIES: CPT

## 2020-10-08 NOTE — PROGRESS NOTES
[id number] [facility] Outpatient  Speech Language Pathology  Adult Daily Note    Annalee Davis  : 1945    Date: 10/8/2020    Visit Information:  SLP Insurance Information: Medicare  Total # of Visits Approved: 35  Total # of Visits to Date: 7            Plan of care signed (Y/N):     Yes    Certification Period: 9/10/20-10/10/20  Plan of Care Visit # 7      Interventions used this date:  Expressive Language and Instruction in Compensatory Strategies    Subjective:  Pt was accompanied to therapy by friend Cicero Severance to observe therapy to possibly help with activities at home. Behavior:  Alert and Cooperative       Objective/Assessment:   Patient progressing towards goals:  1. Pt will answer mod. level yes/no questions with 80% accuracy with min cues to assist the caregiver in obtaining important information regarding the patient's personal, medical, and safety needs. Not addressed   2. Pt will follow 1-2 step directions given orally with 70% accuracy with min cues to increase the pt's ability to follow directions provided by caregivers for safe follow through with ADLs. Not addressed   3. Pt will complete confrontational naming tasks with 70% accuracy with mild verbal cues to help the patient express his/her basic wants and needs. Not addressed   4. Pt will name 10/10 items in a concrete category with min verbal cues to promote semantic organization, neuroplasticity, and the efficiency of word finding for expression of the patient's wants, needs, feelings, and ideas. Not addressed  5. Pt will be educated on word-finding strategies, and use them in structured and unstructured tasks in 60% of given opportunities with mild verbal cues to help the pt express his/her personal, safety, and medical needs in the presence of language deficits. Educated friend Cicero Severance about communication strategies: phonemic cues, binary choices, closed sentences  6.  Pt will complete further cognitive testing as needed to guide POC

## 2020-10-08 NOTE — PROGRESS NOTES
Occupational Therapy  Daily Note     Name: Batsheva Henson  : 1945  MRN: [id number]  Diagnosis:    L intracranial hemorrhage, R weakness, R neglect, R vision impairment    Visit Information:   Onset Date: 20  OT Insurance Information: Medicare  Total # of Visits Approved: 34(35 Visits OT, Max (1 used))  Total # of Visits to Date: 7  Progress Note Counter: 5    Date: 10/8/2020  OT Therapeutic activities 55 minutes for 4 unit(s), CPT 59449     OT Individual Minutes  Time In: 0900  Time Out: 4127  Minutes: 54    Referring Practitioner: Dr. Joann Campos MD              Subjective:  Pt  present during OT treatment. Pain rating:   Pre-treatment pain:    Pt denies pain    Action for pain:   No action necessary. Pain after treatment:      Pt denies pain         Focus of treatment was on the following:   coordination, endurance, fine motor/dexterity, strengthening  right , visual motor and visual/perceptual     Objective:    Treatment Activity:      Exercises:  Pt completed the following BTE exercises: #122 at 1 lbs for bilateral coordination/elbow and wrist flexion/extension. Pt completed for 2 minutes. Goal met. Increased resistance to 2 lbs. # 162 at 0 lbs for right hand  strength. Pt completed for 2 minutes. No change in time or resistance. # 601 at 0 lbs for supination/pronation right UE. Pt completed for 2 minutes. No change in time or resistance. Visual scanning activity with Spot it game. Pt with Max difficulty to participate. Pt required reading glasses and was trying to communicate with therapist. Therapist was not understanding.  asked about glasses, and pt stated \"yes, he (therapist) never asked me\". Pt with Max difficulty spotting items, requiring verbal cues to scan to R side to look for pictures and point to stated item. Pt at times stating she did not understand, but was able to point to pictures. Stated objective 3 times during activity.  Pt able to point to pictures on L side with increased time, but quicker than R side. Pt then used 1 lb resistive clip to obtain 1/2\" cubes. Pt at first [facility] to align hand with cube and lift to place into container. Pt gradually required less assist with RUE and assist to steady cube, with verbal cues to turn hand for better alignment. Pt able to depress clip without assistance. Pt crossing midline and placing on same side to place cubes into container. Pt with increased time to flex shoulder to height of container. At times, pt required assistance to adjust grasp pattern on clip. Pt at times attempted to adjust clip off of her body. VC's to depress clip to let go of cube at times.  stated he attempted to have pt at home  coffee cup with small amount of coffee with R hand. Reported pt with difficulty picking up cup. Recommended to continue similar activities at home for HEP and cross midline to retrieve or place items.  verbalized understanding. Discussed previous HEP: yes, Pt and  reported non compliance- \"been too busy or not in a work mode\". Educated pt on importance of HEP to maximize possible benefits. Pt verbalized understanding. Assessment:   Pt tolerated treatment well. Plan:   Continue POC    Goals:     Long term goals  Time Frame for Long term goals : 2 x/week for 12 weeks  Long term goal 1: Patient will be Supervised with all recommended HEP's, adaptive strategies, and adaptive techniques. Long term goal 2: Patient will increase R  strength from current by 10 lbs to increase performance with I/ADL's. Long term goal 3: Patient will increase R pinch strength from current by 5 lbs to increase performance with I/ADL's. Long term goal 4: Patient will increase dexterity in R hand as observed by 9 hole peg test by completing WFL to increase performance with I/ADL's.   Long term goal 5: Patient will increase RUE coordination as observed by box and blocks by completing [facility] to increase performance with I/ADL's. Long term goals 6: Patient will improve  RUE sensation and/or utilize compensatory techniques for safe completion of self-care as projected. Long term goal 7: Patient will scan environment to the right side without verbal cues to increase safety and performance with functional activities. Long term goal 8: Pt will complete clock drawing test for further assessment of visual perceptual skills. Long term goal 9: Pt will be IND using adaptive utensils to increase self-feeding skills.     LIAM Light/L   10/8/2020  10:15 AM

## 2020-10-09 ENCOUNTER — HOSPITAL ENCOUNTER (OUTPATIENT)
Dept: PHYSICAL THERAPY | Age: 75
Setting detail: THERAPIES SERIES
Discharge: HOME | End: 2020-10-09
Payer: MEDICARE

## 2020-10-09 ENCOUNTER — HOSPITAL ENCOUNTER (OUTPATIENT)
Dept: OCCUPATIONAL THERAPY | Age: 75
Setting detail: THERAPIES SERIES
Discharge: HOME | End: 2020-10-09
Payer: MEDICARE

## 2020-10-09 PROCEDURE — 97112 NEUROMUSCULAR REEDUCATION: CPT

## 2020-10-09 PROCEDURE — 97530 THERAPEUTIC ACTIVITIES: CPT

## 2020-10-09 PROCEDURE — 97116 GAIT TRAINING THERAPY: CPT

## 2020-10-09 PROCEDURE — 97110 THERAPEUTIC EXERCISES: CPT

## 2020-10-09 NOTE — PROGRESS NOTES
[address]     Ph: [phone]  Fax: [phone]    [] Certification  [] Recertification []  Plan of Care  [x] Progress Note [] Discharge      To: Silvio Hayden MD      From:  Sonia Altamirano, PT, DPT  Patient: Crissy Canseco     : 1945  Diagnosis: L intracranial hemorrhage, right weakness     Date: 10/9/2020  Treatment Diagnosis: impaired balance, impaired gait, decreased LE strength    Plan of Care/Certification Expiration Date: 20  Progress Report Period from:  2020  to 10/9/2020    Total # of Visits to Date: 8   No Show: 0    Canceled Appointment: 0     OBJECTIVE:   Short Term Goals - Time Frame for Short term goals: 4 weeks    Goals Current/Discharge status  Met   Short term goal 1: Patient will be independent with bed mobility. Bed mobility  Rolling to Left: Supervision  Rolling to Right: Supervision  Supine to Sit: Modified independent  Sit to Supine: Minimal assistance(for RLE into bed)   [x] yes  [x] no   Short term goal 2: Patient will be independent with transfers. Supervision with transfers [] yes  [x] no   Short term goal 3: Patient will be independent with HEP. Pt reports compliance with HEP [x] yes  [] no     Long Term Goals - Time Frame for Long term goals : 8 weeks  Goals Current/ Discharge status Met   Long term goal 1: Patient will increase strength in bilateral LEs >/= 4+/5 for improved ambulation and transfers. Strength RLE  R Hip Flexion: 3/5  R Hip Extension: 3-/5  R Hip ABduction: 3-/5  R Knee Flexion: 4/5  R Knee Extension: 4+/5  R Ankle Dorsiflexion: 4/5  Strength LLE  L Hip Flexion: 4+/5  L Hip Extension: 3+/5  L Hip ABduction: 4+/5  L Knee Flexion: 5/5  L Knee Extension: 5/5  L Ankle Dorsiflexion: 5/5   [] yes  [x] no   Long term goal 2: Patient will ambulate with ww 200 ft independently with improved bilateral symmetry.  Ambulation 1  Surface: carpet  Device: [address]: Contact guard assistance, Minimal assistance  Quality of Gait: Assist for obstacle negotiation, occasionally decreased Rt foot clearance, assist to keep [employer] closer to body  Distance: 100 ft   [] yes  [x] no   Long term goal 3: Patient will ascend/descend 12 steps using one HR with supervision. Stairs  # Steps : 4  Stairs Height: 6\"  Rails: Left ascending  Device: No Device  Assistance: Contact guard assistance  Comment: Reciprocla ascending with use of Lt rail, descends NR with LLE lead safely   [] yes  [x] no   Long term goal 4: Duffy balance >/= 45/56 to demonstrate improved static balance. Duffy Balance Score: 27   [] yes  [x] no       Body structures, Functions, Activity limitations: Decreased functional mobility , Decreased strength, Decreased safe awareness, Decreased endurance, Decreased cognition, Decreased balance, Decreased coordination, Decreased posture  Assessment: Pt with 8 point improvement on Duffy balance test to 27/56. Pt demonstrates improving gait tolerance, though needing assist for safety d/t vision impairments. Pt safest descending stairs non-reciprocally leading with LLE. Pt would benefit from continued therapy to address deficits for improved tolerance to functional activities. PLAN: [x] Evaluate and Treat  Frequency/Duration:  Plan  Times per week: 2  Plan weeks: 8  Current Treatment Recommendations: Strengthening, Functional Mobility Training, Transfer Training, Endurance Training, Gait Training, Stair training, Neuromuscular Re-education, Manual Therapy - Soft Tissue Mobilization, Manual Therapy - Joint Manipulation, Home Exercise Program, Safety Education & Training, Patient/Caregiver Education & Training, Equipment Evaluation, Education, & procurement, Modalities                       Patient Status:[x] Continue/ Initiate plan of Care    [] Discharge PT. Recommend pt continue with HEP.      [] Additional visits requested, Please re-certify for additional visits:          Signature: Objective information by: Electronically signed by José Solano PTA on 10/9/20 at 9:44 AM EDT  Electronically signed by Sonia Altamirano PT on 10/9/2020 at 5:23 PM      If you have any questions or concerns, please don't hesitate to call. Thank you for your referral.    I have reviewed this plan of care and certify a need for medically necessary rehabilitation services.     Physician Signature:__________________________________________________________  Date:  Please sign and return

## 2020-10-09 NOTE — PROGRESS NOTES
[address] [address]  Outpatient Physical Therapy    Treatment Note        Date: 10/9/2020  Patient: Butch Fountain  : 1945  ACCT #: [de-identified]  Referring Practitioner: Beni Larose MD  Diagnosis: L intracranial hemorrhage, right weakness    Visit Information:  PT Visit Information  Onset Date: 20  PT Insurance Information: Medicare; [employer] [employer]  Total # of Visits Approved: ([facility], [facility] 30 visits)  Total # of Visits to Date: 8  Plan of Care/Certification Expiration Date: 20  No Show: 0  Canceled Appointment: 0  Progress Note Counter:  (PN due 10/9/20)    Subjective: Pt's  present during tx. States pt steady on stairs, having no issues with use of rails.      HEP Compliance:  [x] Good [] Fair [] Poor [] Reports not doing due to:    Vital Signs  Patient Currently in Pain: Denies   Pain Screening  Patient Currently in Pain: Denies    OBJECTIVE:   Exercises  Exercise 8: Gait drills: F/L/R/marching in // bars with 1 UE support x2 laps ea  Exercise 19: Duffy     Bed mobility  Rolling to Left: Supervision  Rolling to Right: Supervision  Supine to Sit: Modified independent  Sit to Supine: Minimal assistance(for RLE into bed)    Ambulation 1  Surface: carpet  Device: Rolling Walker  Assistance: Contact guard assistance, Minimal assistance  Quality of Gait: Assist for obstacle negotiation, occasionally decreased Rt foot clearance, assist to keep [employer] closer to body  Distance: 100 ft    Stairs  # Steps : 4  Stairs Height: 6\"  Rails: Left ascending  Device: No Device  Assistance: Contact guard assistance  Comment: Reciprocla ascending with use of Lt rail, descends NR with LLE lead safely    Transfers  Sit to Stand: Supervision  Stand to sit: Supervision  Bed to Chair: Supervision    Strength: [] NT  [x] MMT completed:  Strength RLE  R Hip Flexion: 3/5  R Hip Extension: 3-/5  R Hip ABduction: 3-/5  R Knee Flexion: 4/5  R Knee Extension: 4+/5  R Ankle Dorsiflexion: 4/5  Strength LLE  L Hip Flexion: 4+/5  L Hip Extension: 3+/5  L Hip ABduction: 4+/5  L Knee Flexion: 5/5  L Knee Extension: 5/5  L Ankle Dorsiflexion: 5/5    *Indicates exercise, modality, or manual techniques to be initiated when appropriate    Assessment: Body structures, Functions, Activity limitations: Decreased functional mobility , Decreased strength, Decreased safe awareness, Decreased endurance, Decreased cognition, Decreased balance, Decreased coordination, Decreased posture  Assessment: Pt with 8 point improvement on Duffy balance test to 27/56. Pt demonstrates improving gait tolerance, though needing assist for safety d/t vision impairments. Pt safest descending stairs non-reciprocally leading with LLE. Pt would benefit from continued therapy to address deficits for improved tolerance to functional activities. Treatment Diagnosis: impaired balance, impaired gait, decreased LE strength        Goals:  Short term goals  Time Frame for Short term goals: 4 weeks  Short term goal 1: Patient will be independent with bed mobility. Short term goal 2: Patient will be independent with transfers. Short term goal 3: Patient will be independent with HEP. Long term goals  Time Frame for Long term goals : 8 weeks  Long term goal 1: Patient will increase strength in bilateral LEs >/= 4+/5 for improved ambulation and transfers. Long term goal 2: Patient will ambulate with ww 200 ft independently with improved bilateral symmetry. Long term goal 3: Patient will ascend/descend 12 steps using one HR with supervision. Long term goal 4: Duffy balance >/= 45/56 to demonstrate improved static balance. Progress toward goals: Tested for PN     POST-PAIN       Pain Rating (0-10 pain scale):  0 /10   Location and pain description same as pre-treatment unless indicated.    Action: [] NA   [x] Perform HEP  [] Meds as prescribed  [] Modalities as prescribed   [] Call Physician     Frequency/Duration:  Plan  Times per week: 2  Plan weeks: 8  Current Treatment Recommendations: Strengthening, Functional Mobility Training, Transfer Training, Endurance Training, Gait Training, Stair training, Neuromuscular Re-education, Manual Therapy - Soft Tissue Mobilization, Manual Therapy - Joint Manipulation, Home Exercise Program, Safety Education & Training, Patient/Caregiver Education & Training, Equipment Evaluation, Education, & procurement, Modalities     Pt to continue current HEP. See objective section for any therapeutic exercise changes, additions or modifications this date.          PT Individual Minutes  Time In: 1400  Time Out: 7607  Minutes: 55  Timed Code Treatment Minutes: 55 Minutes  Procedure Minutes: 0     Timed Activity Minutes Units   Ther Ex 15 1   Gait 15 1   Neuro 25 2       Signature:  Electronically signed by Sherrie Sunshine PTA on 10/9/20 at 3:10 PM EDT

## 2020-10-09 NOTE — PROGRESS NOTES
Occupational Therapy  Daily Note     Name: Tadeo Rivas  : 1945  MRN: [id number]  Diagnosis:    L intracranial hemorrhage, R weakness, R neglect, R vision impairment    Visit Information:   Onset Date: 20  OT Insurance Information: Medicare  Total # of Visits Approved: 34(35 Visits OT, Max (1 used))  Total # of Visits to Date: 8  Progress Note Counter: 6    Date: 10/9/2020  OT Therapeutic activities 55 minutes for 4 unit(s), CPT 49041       OT Individual Minutes  Time In: 1300  Time Out: 1427  Minutes: 54    Referring Practitioner: Dr. Ebenezer Sue MD    Subjective: Pt with . Pain rating:   Pre-treatment pain:    Pt denies pain    Action for pain:   No action necessary. Pain after treatment:      Pt denies pain         Focus of treatment was on the following:   assess for progress toward goals      & Pinch Strength  Average of 3 tries Right eval Norm 10/9    (lb) 18.3 Female age 76-69: 46 lbs  6   Arvizu Pinch (lb) 6.67 Female age 76-69: 9.5 lbs  5.5   Lateral Pinch (lb) NT Female age 76-69: 11.0 lbs  7.5   Comments: lateral pinch NT d/t time constraints on eval.      Coordination & Dexterity    Right eval Norm 10/9   Nine Hole Peg Test  (seconds) NT Female age 76-69: 20.2 s  7 min 40 sec   Box & Blocks  (# of blocks) NT Female age 76-69: 74.5 5   Comments: Pt unable to follow directions to complete 9 HPT on eval. Pt placed 5 blocks (hand moved up divider and dropped to other side) in 1 min. Pt with difficulty understanding directions. Pt completed 9 HPT with L hand under 1 min.      Sensation:   Impaired  [place] filament assessment completed (see chart below) [place] Monofilaments:               Right eval  10/9/2020     Anterior Posterior  Anterior Posterior   Thumb 4.31 4.31 3.61 3.61    Index 4.31 4.31 3.61 4.31   Middle 4.31 4.31 3.61 3.61   Ring 4.31 4.31 3.61 3.61   Little 4.31 4.31 3.61 4.31   Palmar 4.31 4.31 3.61 4.31    Comments: 10/9 Pt with weeks  Long term goal 1: Patient will be Supervised with all recommended HEP's, adaptive strategies, and adaptive techniques. Long term goal 2: Patient will increase R  strength from current by 10 lbs to increase performance with I/ADL's. Long term goal 3: Patient will increase R pinch strength from current by 5 lbs to increase performance with I/ADL's. Long term goal 4: Patient will increase dexterity in R hand as observed by 9 hole peg test by completing WFL to increase performance with I/ADL's. Long term goal 5: Patient will increase RUE coordination as observed by box and blocks by completing WFL to increase performance with I/ADL's. Long term goals 6: Patient will improve  RUE sensation and/or utilize compensatory techniques for safe completion of self-care as projected. Long term goal 7: Patient will scan environment to the right side without verbal cues to increase safety and performance with functional activities. Long term goal 8: Pt will complete clock drawing test for further assessment of visual perceptual skills. Long term goal 9: Pt will be IND using adaptive utensils to increase self-feeding skills.     LIAM Malloy/L   10/9/2020  1:57 PM

## 2020-10-09 NOTE — PROGRESS NOTES
OCCUPATIONAL THERAPY PROGRESS NOTE  [x]  [address] [facility] [address] Jam[address]        Ph: [phone]         Fax: [phone]          []  [facility]         [address]         Ph: [phone]         Fax: [phone]        [] Certification     [] Recertification     [] Plan of Care    [x] Progress Note        Date: 10/9/2020    To:Referring Practitioner: Dr. Myles Blanco MD            From: Yayo Cardoso, OTR/L  Patient: Melissa Cervantes       : 1945  MRN: [id number]  Diagnosis:Diagnosis:    L intracranial hemorrhage, R weakness, R neglect, R vision impairment   Date of eval: 9/10/2020    Visit Information:   Onset Date: 20  OT Insurance Information: Medicare  Total # of Visits Approved: 34(35 Visits OT, Max (1 used))  Total # of Visits to Date: 8  Progress Note Counter: 6    Last POC date: n/a   Reporting period: 9/10/20- 10/10/20                            Assessment:    Goals Current/Discharge status  Met   Long term goal 1: Patient will be Supervised with all recommended HEP's, adaptive strategies, and adaptive techniques. Ongoing  [] Met  [] Partially Met  [] Not Met   Long term goal 2: Patient will increase R  strength from current by 10 lbs to increase performance with I/ADL's. eval 18.3 lbs, now 6 lbs [] Met  [] Partially Met  [x] Not Met   Long term goal 3: Patient will increase R pinch strength from current by 5 lbs to increase performance with I/ADL's. 6.67 lbs eval fu pinch, now 5.5     [] Met  [] Partially Met  [x] Not Met   Long term goal 4: Patient will increase dexterity in R hand as observed by 9 hole peg test by completing WFL to increase performance with I/ADL's.  NT d/t unable to grasp pegs or follow directions, now & min 40 sec, less than 1 min with L hand   [] Met  [x] Partially Met  [] Not Met   Long term goal 5: Patient will increase RUE coordination as observed by box and blocks by completing WFL to increase performance with I/ADL's. NT d/t time constraint on eval, now 5 blocks [] Met  [] Partially Met  [x] Not Met   Long term goals 6: Patient will improve  RUE sensation and/or utilize compensatory techniques for safe completion of self-care as projected. See chart below  [] Met  [x] Partially Met  [] Not Met   Long term goal 7: Patient will scan environment to the right side without verbal cues to increase safety and performance with functional activities. Pt still requires verbal cues to scan and attend to R side [] Met  [x] Partially Met  [] Not Met     Sensation:   Impaired  Tarzana-Hudson filament assessment completed (see chart below) Tarzana-Hudson Monofilaments:                    Right eval  10/9/2020     Anterior Posterior  Anterior Posterior   Thumb 4.31 4.31 3.61 3.61    Index 4.31 4.31 3.61 4.31   Middle 4.31 4.31 3.61 3.61   Ring 4.31 4.31 3.61 3.61   Little 4.31 4.31 3.61 4.31   Palmar 4.31 4.31 3.61 4.31    Comments: 10/9 Pt with accurate response closer to MCP (responding \"yes\") if feeling filament on posterior side.      Monofilament  Size Representation   Hand Threshold    2.83 Green Normal    3.61 Blue Diminished light touch   4.31 Purple  Diminished Protective Sensation   4.56 Red Loss of Protective   Sensation    6.65 Red Loss of Protective   Sensation    Comments:       Functional assessment used:  Activity Measure for Post Acute Care [facility])     Score on eval:   AM-PAC Inpatient Daily Activity Raw Score: 14  AM-PAC Inpatient ADL T-Scale Score : 33.39  ADL Inpatient CMS 0-100% Score: 59.67  ADL Inpatient CMS G-Code Modifier : CK    Score currently:   AM-PAC Inpatient Daily Activity Raw Score: 17  AM-PAC Inpatient ADL T-Scale Score : 37.26  ADL Inpatient CMS 0-100% Score: 50.11  ADL Inpatient CMS G-Code Modifier : CK                           Frequency/Duration: Time Frame for Long term goals : 2 x/week for 12 weeks

## 2020-10-13 ENCOUNTER — HOSPITAL ENCOUNTER (OUTPATIENT)
Dept: SPEECH THERAPY | Age: 75
Setting detail: THERAPIES SERIES
Discharge: HOME | End: 2020-10-13
Payer: MEDICARE

## 2020-10-13 ENCOUNTER — HOSPITAL ENCOUNTER (OUTPATIENT)
Dept: PHYSICAL THERAPY | Age: 75
Setting detail: THERAPIES SERIES
Discharge: HOME | End: 2020-10-13
Payer: MEDICARE

## 2020-10-13 PROCEDURE — 92507 TX SP LANG VOICE COMM INDIV: CPT

## 2020-10-13 PROCEDURE — 97110 THERAPEUTIC EXERCISES: CPT

## 2020-10-13 PROCEDURE — 97116 GAIT TRAINING THERAPY: CPT

## 2020-10-13 NOTE — PROGRESS NOTES
weight shift, and UE/LE coordination exercises:  \"butt scoots\", trunk rotation with alternating UE support while seated on the mat, Lateral weight shift with UE weight shifts,  Exercise 18: sit to stand:  2 sets of 5 with close supervision, but no UE support by patient    *Indicates exercise, modality, or manual techniques to be initiated when appropriate    Assessment:   Activity Tolerance  Activity Tolerance: Patient Tolerated treatment well    Body structures, Functions, Activity limitations: Decreased functional mobility , Decreased strength, Decreased safe awareness, Decreased endurance, Decreased cognition, Decreased balance, Decreased coordination, Decreased posture  Assessment: Patient showed improvement with repetition in seated coordination exercises. Patient shows difficulty with body awareness, tactile and kinestheitc sense, and vision, making gait at stairs very taxing, and requiring supervision for safety and instruction. Patient did not follow commands to \"turn to the right\" or \"turn left\" during ambilation. She was able to stop during gait when requested. Gait technique is slow,  with difficulty motor planning during direction changes, causing patient to lean to the left side when changing directions. Patient continues to be at risk for falls. Treatment Diagnosis: impaired balance, impaired gait, decreased LE strength  Prognosis: Good       Goals:  Short term goals  Time Frame for Short term goals: 4 weeks  Short term goal 1: Patient will be independent with bed mobility. Short term goal 2: Patient will be independent with transfers. Short term goal 3: Patient will be independent with HEP. Long term goals  Time Frame for Long term goals : 8 weeks  Long term goal 1: Patient will increase strength in bilateral LEs >/= 4+/5 for improved ambulation and transfers. Long term goal 2: Patient will ambulate with ww 200 ft independently with improved bilateral symmetry.   Long term goal 3: Patient will ascend/descend 12 steps using one HR with supervision. Long term goal 4: Duffy balance >/= 45/56 to demonstrate improved static balance. Progress toward goals: gait, balance, motor planning    POST-PAIN       Pain Rating (0-10 pain scale):  0 /10   Location and pain description same as pre-treatment unless indicated. Action: [x] NA   [] Perform HEP  [] Meds as prescribed  [] Modalities as prescribed   [] Call Physician     Frequency/Duration:  Plan  Times per week: 2  Plan weeks: 8  Current Treatment Recommendations: Strengthening, Functional Mobility Training, Transfer Training, Endurance Training, Gait Training, Stair training, Neuromuscular Re-education, Manual Therapy - Soft Tissue Mobilization, Manual Therapy - Joint Manipulation, Home Exercise Program, Safety Education & Training, Patient/Caregiver Education & Training, Equipment Evaluation, Education, & procurement, Modalities     Pt to continue current HEP. See objective section for any therapeutic exercise changes, additions or modifications this date.          PT Individual Minutes  Time In: 2937  Time Out: 1205  Minutes: 60  Timed Code Treatment Minutes: 60 Minutes  Procedure Minutes:n/a   Timed Activity Minutes Units   Ther Ex 30 2   gait 30 2       Signature:  Electronically signed by Comfort Hitchcock on 10/13/20 at 12:59 PM EDT

## 2020-10-13 NOTE — PROGRESS NOTES
[x][facility]    [][facility] [address] [place] [address], [address] [place] [address], [address] [place] [address].      Phone: [phone]     Phone: [phone]      Fax: [phone]     Fax: [phone]    ______________________________________________________________________                [facility]  Speech Language Pathology  Adult Progress Note                          Physician: Dr. Renzo Coronado   From: Ivory Araujo MA,CCC-SLP   Patient: Daquan Avila       : 1945  Diagnosis: Aphasia and Decreased Cognition  Date: 10/13/2020  Treatment Diagnosis: R47.01  Date of Evaluation: 9/10/20      Plan of Care/Treatment to date: Expressive Language Therapy, Receptive Language Therapy and Instruction in Compensatory Strategies    Date range from 9/10/20 to 10/8/20. Subjective:  Patient has attended every therapy session and family has been involved in treatment practicing recommendations at home. SLP has provided family with multiple recommendations for home practice with iPad apps. Progress toward Short-Term Goals:  1. Pt will answer mod. level yes/no questions with 80% accuracy with min cues to assist the caregiver in obtaining important information regarding the patient's personal, medical, and safety needs. Progress made, 70-80% acc with mod cues  2. Pt will follow 1-2 step directions given orally with 70% accuracy with min cues to increase the pt's ability to follow directions provided by caregivers for safe follow through with ADLs.    Progress made, 70-80% acc with mod assist  3. Pt will complete confrontational naming tasks with 70% accuracy with mild verbal cues to help the patient express his/her basic wants and needs.   Progress made, 60-78% acc with min-mod verbal cues  4. Pt will name 10/10 items in a concrete category with min verbal cues to promote semantic organization, neuroplasticity, and the efficiency of word finding for expression of the patient's wants, needs, feelings, and ideas.   Progress made, naming 4-8 items with mod cues. Sometimes becoming frustrated with this task  5. Pt will be educated on word-finding strategies, and use them in structured and unstructured tasks in 60% of given opportunities with mild verbal cues to help the pt express his/her personal, safety, and medical needs in the presence of language deficits. Spouse and friend Von Francis have been educated on strategies. Robert Calvillo is using circumlocution when cued and sometimes Independently uses gestures  6. Pt will complete further cognitive testing as needed to guide POC   Patient has completed cognitive testing as able and has completed further reading comprehension testing as was requested by spouse:  ID letters:  Ind  Word recognition:  Ind  Comprehension of oral spelling: 3/5 Ind  Word/picture matchin/10 Ind and increased to 7/10 with mod cues  Match single words: 100% acc Ind  Pt was able to read name aloud, but was unable to read other single words aloud 0/5. Pt has decreased letter to sound correspondence. 7. Pt will increase average content words in sentence to 5 words independently using picture probe to increase functional communication of wants/needs and expand utterance length. Progress made, initiated use of Response Elaboration Training (RET)    Updated Short-Term Goals:  D/C goal 6 and continue all other goals and add reading goals:  8. Patient will read single functional words with 80% acc and min verbal cues to increase functional reading of information  9. Pt will match picture to single written words with 80% acc min cues to increase patient functional reading abilities       Long-Term Goals:   1.  Pt will improve her Expressive Language Abilities to a phrase/sentence level for effective communication with familiar and unfamiliar communication partners so they may functionally communicate and express safety/medical concerns. 2. Pt will improve her Receptive Language abilities to a 2 step level for comprehension of conversation and safety directions with familiar and unfamiliar communication partners. Frequency/Duration of Treatment:   Days: 2 days/week  Length of Session:  45 minutes and 60 minutes  Weeks: 12 Weeks    Rehab Potential: Excellent    Prognostic Factors: Motivation  Family/community support  Participation level       Goal Status: Partially Achieved      Patient Status: Continue per initial Plan of Care    Discharge Plan: This patients condition is expected to improve within the treatment timeframe. MODIFIED WELCH FALL RISK ASSESSMENT:    History of Falling (has patient fallen in the past 30 days?):    No (0 points)    Secondary Diagnosis (is there more than 1 medical diagnosis in patients medical history?):    Yes (15 points)    Ambulatory Aid:    Crutches, cane or walker (15 points)    Gait:    Impaired - difficult rising from chair, head down, poor balance, requires support (20 points    Mental Status:    Oriented to own ability (0 points)      Total points = 50    Fall Risk Level: high    0 - 24: Low Risk - implement low risk fall prevention interventions    25 - 44: Medium risk  45 and higher: High Risk    JASMYN NOMS: Updated      Electronically signed by: Electronically signed by ARRON Pritchard on 10/13/2020 at 11:00 AM      If you have any questions or concerns, please don't hesitate to call.   Thank you for your referral.      Physician Signature:________________________________Date:__________________  By signing above, therapists plan is approved by physician

## 2020-10-15 ENCOUNTER — HOSPITAL ENCOUNTER (OUTPATIENT)
Dept: SPEECH THERAPY | Age: 75
Setting detail: THERAPIES SERIES
Discharge: HOME | End: 2020-10-15
Payer: MEDICARE

## 2020-10-15 ENCOUNTER — HOSPITAL ENCOUNTER (OUTPATIENT)
Dept: OCCUPATIONAL THERAPY | Age: 75
Setting detail: THERAPIES SERIES
Discharge: HOME | End: 2020-10-15
Payer: MEDICARE

## 2020-10-15 PROCEDURE — 92507 TX SP LANG VOICE COMM INDIV: CPT

## 2020-10-15 PROCEDURE — 97530 THERAPEUTIC ACTIVITIES: CPT

## 2020-10-15 NOTE — PROGRESS NOTES
[facility]  Speech Language Pathology  Adult Daily Note    Diamond Galindo  : 1945    Date: 10/15/2020    Visit Information:  SLP Insurance Information: Medicare  Total # of Visits Approved: 35  Total # of Visits to Date: 9            Plan of care signed (Y/N):     Yes    Certification Period: 10/13/20-20  Plan of Care Visit # 9      Interventions used this date:  Expressive Language and Instruction in Compensatory Strategies    Subjective:  Pt was accompanied to therapy by  this date.  requested another copy of the language apps given earlier by SLP. 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 SLP will put chart on hold until able to resume therapy. Behavior:  Alert and Cooperative       Objective/Assessment:   Patient progressing towards goals:  1. Pt will answer mod. level yes/no questions with 80% accuracy with min cues to assist the caregiver in obtaining important information regarding the patient's personal, medical, and safety needs. Pt answered moderate yes/no questions with 84% acc and min-mod verbal cues/repetition    Answered simple-moderate [address] questions with 90% acc with min-mod verbal cues  2. Pt will follow 1-2 step directions given orally with 70% accuracy with min cues to increase the pt's ability to follow directions provided by caregivers for safe follow through with ADLs. Not addressed   3. Pt will complete confrontational naming tasks with 70% accuracy with mild verbal cues to help the patient express his/her basic wants and needs. Completed confrontational naming of low frequency pictures with 86% acc with min-mod verbal cues  4. Pt will name 10/10 items in a concrete category with min verbal cues to promote semantic organization, neuroplasticity, and the efficiency of word finding for expression of the patient's wants, needs, feelings, and ideas.    Not addressed  5. Pt will be educated on word-finding strategies, and use them in structured and unstructured tasks in 60% of given opportunities with mild verbal cues to help the pt express his/her personal, safety, and medical needs in the presence of language deficits. 6. Pt will increase average content words in sentence to 5 words independently using picture probe to increase functional communication of wants/needs and expand utterance length. Response Elaboration Training (RET) was completed to target improved word retrieval and utterance length in conversation. With open-ended action picture stimuli, the patient initially formed sentences with 4 content words on average (minimum: 1 words, maximum: 7 words for Step 1). After SLP intervention which included shaping, modeling, reinforcing, and eliciting elaboration, the patient was able to form sentences with 8 content words on average (minimum: 6 words, maximum: 10 words for Step 6). Over 10 trials today, the quality of responses were noted to have mild phonemic paraphasias. 7. Patient will read single functional words with 80% acc and min verbal cues to increase functional reading of information  8. Pt will match picture to single written words with 80% acc min cues to increase patient functional reading abilities   Pain Assessment:  Initial Assessment:  Patient denies pain. Re-assessment:  Patient denies pain. Plan:  Continue with current goals    Patient/Caregiver Education:  Patient/Caregiver educated on session. Caregiver observed session.   Patient/Caregiver provided with home program: Patient given another copy of language apps for practice at home on iPad    Time in: 1000  Time out:1045  Minutes seen: 39      Signature: Electronically signed by Adri Garcia SLP on 10/15/2020 at 10:58 AM

## 2020-10-15 NOTE — PROGRESS NOTES
Occupational Therapy  Daily Note     Name: Nathan Griffiths  : 1945  MRN: [id number]  Diagnosis:    L intracranial hemorrhage, R weakness, R neglect, R vision impairment    Visit Information:   Onset Date: 20  OT Insurance Information: Medicare  Total # of Visits Approved: 34(35 Visits OT, Max (1 used))  Total # of Visits to Date: 9  Progress Note Counter: 7    Date: 10/15/2020  OT Therapeutic activities 55 minutes for 4 unit(s), CPT 07037     OT Individual Minutes  Time In: 1100  Time Out: 4932  Minutes: 54    Referring Practitioner: Dr. Catherine Rollins MD    Subjective:   stated \"not sure about terminology about discharge\". Educated on hold vs discharge. Pt will be placed on hold for sx scheduled next week. Pain rating:   Pre-treatment pain:    Pt denies pain    Action for pain:   No action necessary. Pain after treatment:      Pt denies pain         Focus of treatment was on the following:   coordination, increase right UE active ROM, neuromuscular re-education, strengthening right UE and strengthening  right      Objective:    Treatment Activity:      Exercises:  Pt completed the following BTE exercises:   #122 at 2 lbs for bilateral coordination/elbow and wrist flexion/extension. Pt completed for 3 minutes. # 162 at 0 lbs for right hand  strength. Pt completed for 2 minutes. Pt met goal. No change resistance. Increase time to 3 min. # 601 at 0 lbs for supination/pronation right UE. Pt completed for 2 minutes. Pt met goal. 1 episode of dropping below 50%. No change resistance. Increase time to 30 Min. Comments: Pt with more R side neglect noted on this date compared to previous sessions with BTE. Increased assist to place hand and maintain grasp while using BTE tools. Pt seated with RUE on edge of table. Pt retrieved bean bags placed on RUE to increase attention to R side and to extend RUE as distal as possible. Pt with cues to scan to R side to find bean bags.  Pt often closing L eye. Pt declined using glasses when asked. Pt with [place] assist to obtain bean bag and slide as far as possible. Pt with 3 trials with [place] before able to continue with Min verbal cues. Pt able to push bean bags off end of table with cues to fully flex shoulder and slide bean bag (pt at times just picking up and placing in a pile). Pt with slow movements but able to increase pace to fair by end of activity. Pt declined rest breaks when appearing fatigued. Upgraded to blue foam block.  asked about using putty. Pt with difficulty manipulating blue foam block (pt unable to recall if having difficulty with pink foam block). Pt with assist to place foam block in hand and to isolate movement of R hand (thumb vs all digits). Discussed previous HEP: yes,  stated pt has difficulty isolating thumb movement. Educated  and pt to assist with holding digits while completing HEP. Pt able to isolate thumb x 20 reps with therapist asisting with maintaining digit flexion on blue foam block. Comments:  reported pt using 1 lb weight at home that pt's son recommended. Pt with Max difficulty attempting to retrieve 1 lb weight from table, or even flexing elbow when placed at hand closer to trunk.  stated maybe less than 1 lb for the weight at home. Instructed to try to do too much, as pt with difficulty holding RUE against gravity for extended time. Assessment:   Pt tolerated treatment well. 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 Will place on hold for 30 days. Informed will need resume orders for all therapies. Pt and  verbalized understanding. Goals:     Long term goals  Time Frame for Long term goals : 2 x/week for 12 weeks  Long term goal 1: Patient will be Supervised with all recommended HEP's, adaptive strategies, and adaptive techniques.   Long term goal 2: Patient will increase R  strength from current by 10

## 2020-10-16 ENCOUNTER — HOSPITAL ENCOUNTER (OUTPATIENT)
Dept: OCCUPATIONAL THERAPY | Age: 75
Setting detail: THERAPIES SERIES
Discharge: HOME | End: 2020-10-16
Payer: MEDICARE

## 2020-10-16 ENCOUNTER — HOSPITAL ENCOUNTER (OUTPATIENT)
Dept: PHYSICAL THERAPY | Age: 75
Setting detail: THERAPIES SERIES
Discharge: HOME | End: 2020-10-16
Payer: MEDICARE

## 2020-10-16 PROCEDURE — 97112 NEUROMUSCULAR REEDUCATION: CPT

## 2020-10-16 PROCEDURE — 97116 GAIT TRAINING THERAPY: CPT

## 2020-10-16 PROCEDURE — 97530 THERAPEUTIC ACTIVITIES: CPT

## 2020-10-16 NOTE — PROGRESS NOTES
Occupational Therapy  Daily Note     Name: Frank Cagle  : 1945  MRN: [id number]  Diagnosis:    L intracranial hemorrhage, R weakness, R neglect, R vision impairment    Visit Information:   Onset Date: 20  OT Insurance Information: Medicare  Total # of Visits Approved: 34(35 Visits OT, Max (1 used))  Total # of Visits to Date: 10  Progress Note Counter: 8    Date: 10/16/2020  OT Therapeutic activities 60 minutes for 4 unit(s), CPT 20733       OT Individual Minutes  Time In: 1300  Time Out: 1400  Minutes: 61    Referring Practitioner: Dr. Nuris Esquivel MD              Subjective:         Pain rating:   Pre-treatment pain:    Pt denies pain    Action for pain:   No action necessary. Pain after treatment:      Pt denies pain         Focus of treatment was on the following:   coordination, endurance, fine motor/dexterity, increase right UE active ROM, neuromuscular re-education, strengthening  right  and visual/perceptual     Objective:    Treatment Activity:       Attempted pipe tree. Pt with  Max VC. Pt stated not understanding. [place] to initiate crossing midline with RUE to retrieve elbow and couplers. [place] to secure pipes. Attempted to have pt use L hand to stabilize and place. Pt with difficulty following instructions. Pt unable to build looking at map or free style     Pt stated not completing AROM for shoulder at home.  stated pt spontaneously completing AAROM for shoulder flexion. Educated on importance of doing all recommended HEP. Pt with Max difficullty with 1 lb yellow clip to  1/2 cubes. [place] assist, completed 6 pt. Therapist assisted to stabilize block and assist with aligning hand. Pt become frustrated, \"cant see\", but able to use L hand to  handful and place in contanier on R side. Pt able to retreive and place bean bags from container with RUE with increase effort. Pt with Max effort to complete.  Pt with difficulty saying colors of bean bags, required hints often. Pt not able to toss bean bags with R hand. Difficulty coordinating on when to let go. Discussed previous HEP: yes, Pt stated not sure or no when asked about HEP.  stated completing HEP. Comments: Miscommunication about when starting hold. Pt starts 30 day hold today. Assessment:   Pt tolerated treatment fair. Plan:   Pt on hold for 30 days . Pt having sx next week. Will need resume therapy orders. Goals:     Long term goals  Time Frame for Long term goals : 2 x/week for 12 weeks  Long term goal 1: Patient will be Supervised with all recommended HEP's, adaptive strategies, and adaptive techniques. Long term goal 2: Patient will increase R  strength from current by 10 lbs to increase performance with I/ADL's. Long term goal 3: Patient will increase R pinch strength from current by 5 lbs to increase performance with I/ADL's. Long term goal 4: Patient will increase dexterity in R hand as observed by 9 hole peg test by completing WFL to increase performance with I/ADL's. Long term goal 5: Patient will increase RUE coordination as observed by box and blocks by completing WFL to increase performance with I/ADL's. Long term goals 6: Patient will improve  RUE sensation and/or utilize compensatory techniques for safe completion of self-care as projected. Long term goal 7: Patient will scan environment to the right side without verbal cues to increase safety and performance with functional activities. Long term goal 8: Pt will complete clock drawing test for further assessment of visual perceptual skills. Long term goal 9: Pt will be IND using adaptive utensils to increase self-feeding skills.     LIAM Loera/L   10/16/2020  4:38 PM

## 2020-10-16 NOTE — PROGRESS NOTES
[address] [address]  Outpatient Physical Therapy    Treatment Note        Date: 10/16/2020  Patient: Lopez Stanford  : 1945  ACCT #: [de-identified]  Referring Practitioner: Tracey Thomas MD  Diagnosis: L intracranial hemorrhage, right weakness    Visit Information:  PT Visit Information  Onset Date: 20  PT Insurance Information: Medicare; [email or web address]  Total # of Visits Approved: (BMN, [facility] 30 visits)  Total # of Visits to Date: 10  Plan of Care/Certification Expiration Date: 20  No Show: 0  Progress Note Due Date: 20  Canceled Appointment: 0  Progress Note Counter: 10/16 (PN due 20)    Subjective: Pt's  pt is having surgery on 10/20/20 and will be on hold until the Dr releases her to come back to PT.      HEP Compliance:  [x] Good [] Fair [] Poor [] Reports not doing due to:    Vital Signs  Patient Currently in Pain: No   Pain Screening  Patient Currently in Pain: No    OBJECTIVE:   Exercises  Exercise 3: hip abduction YTB x15, hip adduction with ball x20 seated  Exercise 7: static standing balance, NBOS with one UE support x15sec, semi-tandem with one UE support; x30sec  Exercise 12: single steps over Tband x 20 forward with bilateral UE assist  Exercise 14: hamstring stretch seated with step 20s x 3 on 6'' step  Exercise 16: placing foot in and out of 6in step with Jena-no A x10  Exercise 18: sit to stand:  2 sets of 5 with close supervision, with occasional UE touch       Ambulation 1  Surface: carpet  Device: Rolling Walker  Quality of Gait: Assist for obstacle negotiation, occasionally decreased Rt foot clearance, assist to keep [facility] closer to body  Gait Deviations: Slow Sanaz, Decreased step length, Decreased step height  Distance: 15ft    Ambulation 2  Surface - 2: carpet  Device 2: Rolling Walker  Assistance 2: Minimal assistance, Contact guard assistance  Quality of Gait 2: Assist for obstacle negotiation, occasionally decreased Rt foot clearance, Treatment Recommendations: Strengthening, Functional Mobility Training, Transfer Training, Endurance Training, Gait Training, Stair training, Neuromuscular Re-education, Manual Therapy - Soft Tissue Mobilization, Manual Therapy - Joint Manipulation, Home Exercise Program, Safety Education & Training, Patient/Caregiver Education & Training, Equipment Evaluation, Education, & procurement, Modalities     Pt to continue current HEP. See objective section for any therapeutic exercise changes, additions or modifications this date.          PT Individual Minutes  Time In: 0201  Time Out: 0946  Minutes: 58  Timed Code Treatment Minutes: 58 Minutes  Procedure Minutes:n/a     Timed Activity Minutes Units   Neuro        30         2   Gait        28        2       Signature:  Electronically signed by Emmy Rogers PTA on 10/16/20 at 4:45 PM EDT

## 2020-10-19 NOTE — PROGRESS NOTES
[facility]    [x]  [address]  []  [address]      [address]     [address] [facility], [address] [place] [address]  Ph: [phone]     Ph: [phone]  Fax: [phone]     Fax: [phone]    Date: 10/19/2020  Patient Name: Royal Flores  : 1945  MRN: [id number]    To:  Referring Practitioner: Juan Jose Martini MD    From: Vargas Moreau PT     [x]    FYI: Patient will be placed on hold from PT due to surgery on 10/20/20. Patient will need new orders to return to PT post procedure. Comments: Thank you for your referral and the opportunity to treat this patient. Please contact us with any questions or concerns.

## 2020-10-20 ENCOUNTER — APPOINTMENT (OUTPATIENT)
Dept: PHYSICAL THERAPY | Age: 75
End: 2020-10-20
Payer: MEDICARE

## 2020-10-20 ENCOUNTER — APPOINTMENT (OUTPATIENT)
Dept: SPEECH THERAPY | Age: 75
End: 2020-10-20
Payer: MEDICARE

## 2020-10-22 ENCOUNTER — APPOINTMENT (OUTPATIENT)
Dept: SPEECH THERAPY | Age: 75
End: 2020-10-22
Payer: MEDICARE

## 2020-10-22 ENCOUNTER — HOSPITAL ENCOUNTER (OUTPATIENT)
Dept: OCCUPATIONAL THERAPY | Age: 75
Setting detail: THERAPIES SERIES
Discharge: HOME | End: 2020-10-22
Payer: MEDICARE

## 2020-10-22 NOTE — PROGRESS NOTES
Therapy                            Cancellation/No-show Note    Date: 10/22/2020  Patient Name: Annalee Davis    : 1945  (37 y.o.)     MRN: [id number]    Account #: [de-identified]            Comments: For today's appointment patient:  [x]  Cancelled  []  Rescheduled appointment  []  No-show   []  Called pt to remind of next appointment     Reason given by patient:  []  Patient ill  []  Conflicting appointment  []  No transportation    []  Conflict with work  []  No reason given  [x]  Other: pt on hold      [] Pt has future appointments scheduled, no follow up needed  [] Pt requests to be on hold.     Reason:   If > 2 weeks please discuss with therapist.  [] Therapist to call pt for follow up     Signature: Electronically signed by MEHRDAD Yi on 10/22/20 at 11:07 AM JABIERT

## 2020-10-23 ENCOUNTER — APPOINTMENT (OUTPATIENT)
Dept: OCCUPATIONAL THERAPY | Age: 75
End: 2020-10-23
Payer: MEDICARE

## 2020-10-23 ENCOUNTER — APPOINTMENT (OUTPATIENT)
Dept: PHYSICAL THERAPY | Age: 75
End: 2020-10-23
Payer: MEDICARE

## 2020-10-27 ENCOUNTER — APPOINTMENT (OUTPATIENT)
Dept: SPEECH THERAPY | Age: 75
End: 2020-10-27
Payer: MEDICARE

## 2020-10-27 ENCOUNTER — APPOINTMENT (OUTPATIENT)
Dept: PHYSICAL THERAPY | Age: 75
End: 2020-10-27
Payer: MEDICARE

## 2020-10-29 ENCOUNTER — APPOINTMENT (OUTPATIENT)
Dept: SPEECH THERAPY | Age: 75
End: 2020-10-29
Payer: MEDICARE

## 2020-10-29 ENCOUNTER — APPOINTMENT (OUTPATIENT)
Dept: OCCUPATIONAL THERAPY | Age: 75
End: 2020-10-29
Payer: MEDICARE

## 2020-10-30 ENCOUNTER — APPOINTMENT (OUTPATIENT)
Dept: PHYSICAL THERAPY | Age: 75
End: 2020-10-30
Payer: MEDICARE

## 2020-10-30 ENCOUNTER — APPOINTMENT (OUTPATIENT)
Dept: OCCUPATIONAL THERAPY | Age: 75
End: 2020-10-30
Payer: MEDICARE

## 2020-11-05 ENCOUNTER — HOSPITAL ENCOUNTER (OUTPATIENT)
Dept: SPEECH THERAPY | Age: 75
Setting detail: THERAPIES SERIES
Discharge: HOME | End: 2020-11-05
Payer: MEDICARE

## 2020-11-05 PROCEDURE — 92507 TX SP LANG VOICE COMM INDIV: CPT

## 2020-11-05 NOTE — PROGRESS NOTES
[x][facility]    [][facility] [address] [place]     [address] [place], [address] Andree [place]aga, [address]      Phone: [phone]     Phone: [phone]      Fax: [phone]     Fax: [phone]    ______________________________________________________________________                [facility]  Speech Language Pathology  Adult Progress Note                          Physician: Dr. Yong Georges   From: Chato Anthony MA,CCC-SLP   Patient: Fabiana Valadez       : 1945  Diagnosis: Aphasia and decreased cognition    Date: 2020  Treatment Diagnosis: R47.01  Date of Evaluation: 9/10/20      Plan of Care/Treatment to date: Expressive Language Therapy, Receptive Language Therapy and Instruction in Compensatory Strategies    Date range from 10/13/10 to 20. Subjective:  Pt had great attendance and participation during this treatment period and caregivers were very involved asking for HEP. Pt had bone flap replacement surgery completed on 10/20/20 and was on hold for therapy until 20. Pt returned to therapy on 20. Progress toward Short-Term Goals:  1. Pt will answer mod. level yes/no questions with 80% accuracy with min cues to assist the caregiver in obtaining important information regarding the patient's personal, medical, and safety needs. Goal met, after surgery pt answered complex yes/no questions with 80% acc Independently   2. Pt will follow 1-2 step directions given orally with 70% accuracy with min cues to increase the pt's ability to follow directions provided by caregivers for safe follow through with ADLs.    Goal met, after surgery pt followed 1 step directions with objects with 100% acc  Followed 2-step directions with objects 75% acc independently and increased to 100% acc with min verbal cues.    3.Pt will complete confrontational by two attributes with 80% acc with mild cues to promote use of circumlocution strategy and help the patient express his/her basic personal, safety, and medical wants and needs. 2. Pt will provide two similarities and two differences for two given words/items with 80% accuracy with mild cues in order to help strengthen semantic organization, neuroplasticity, and the patient's ability to utilize circumlocution for expression of complex wants, needs, feelings, and ideas. 3. Pt will complete convergent and divergent naming tasks with 80% accuracy with mild cues to promote semantic organization and the overall effectiveness and efficiency for expression of his/her wants, needs, feelings, and ideas. 4.  Pt will increase average content words in sentence to 5 words independently using picture probe to increase functional communication of wants/needs and expand utterance length. Response Elaboration Training (RET) was completed to target improved word retrieval and utterance length in conversation. 5. Patient will read single functional words with 80% acc and min verbal cues to increase functional reading of information  6. Pt will match picture to single written words with 80% acc min cues to increase patient functional reading abilities     Long-Term Goals:   1. Pt will improve her Expressive Language Abilities to a phrase/sentence level for effective communication with familiar and unfamiliar communication partners so they may functionally communicate and express safety/medical concerns. Goal met  2. Pt will improve her Receptive Language abilities to a 2 step level for comprehension of conversation and safety directions with familiar and unfamiliar communication partners. Goal met  Add Goals:  1.  Pt will improve her expressive language abilities to a sentence/conversation level for effective communication with familiar and unfamiliar communication partners so they may functionally communicate and express complex ideas. 2. Pt will improve her reading comprehension abilities to a phrase level for effective comprehension of functional materials. Frequency/Duration of Treatment:   Days: 2 days/week  Length of Session:  45 minutes and 60 minutes  Weeks: 12 Weeks    Rehab Potential: Excellent    Prognostic Factors: Motivation  Family/community support  Participation level       Goal Status: Partially Achieved      Patient Status: Continue per initial Plan of Care    Discharge Plan: home          This patients condition is expected to improve within the treatment timeframe. MODIFIED WELCH FALL RISK ASSESSMENT:    History of Falling (has patient fallen in the past 30 days?):    No (0 points)    Secondary Diagnosis (is there more than 1 medical diagnosis in patients medical history?):    Yes (15 points)    Ambulatory Aid:    No device is used (0 points)    Gait:    Normal/bedrest/wheelchair (0 points)    Mental Status:    Overestimates or forgets limitations (15 points)      Total points = 30    Fall Risk Level: medium    0 - 24: Low Risk - implement low risk fall prevention interventions    25 - 44: Medium risk  45 and higher: High Risk    JASMYN NOMS: Updated      Electronically signed by:  Electronically signed by ARRON Barrera on 11/5/2020 at 4:03 PM      If you have any questions or concerns, please don't hesitate to call.   Thank you for your referral.      Physician Signature:________________________________Date:__________________  By signing above, therapists plan is approved by physician

## 2020-11-05 NOTE — PROGRESS NOTES
[facility]  Speech Language Pathology  Adult Daily Note    Annalee Davis  : 1945    Date: 2020    Visit Information:  SLP Insurance Information: Medicare  Total # of Visits Approved: 35  Total # of Visits to Date: 10  No Show: 0  Canceled Appointment: 1      Plan of care signed (Y/N):     Yes    Certification Period: 20-20  Plan of Care Visit # 10      Interventions used this date:  Expressive Language, Receptive Language, Instruction in Compensatory Strategies and Caregiver education    Subjective:  Pt was accompanied to therapy by sister Zaid Flower this date. Pt had bone flap replacement surgery on 10/20 and received return to therapy order from Dr. Israel Meigs on 20. Pt was walking upon arrival and use of right UE, which both were things she was unable to do previously. Patient reported increased ability to communicate and understand, but still having difficulty with reading comprehension. She continues to report hemianopsia. Alert and Cooperative       Objective/Assessment:   Patient progressing towards goals:  1. Pt will answer mod. level yes/no questions with 80% accuracy with min cues to assist the caregiver in obtaining important information regarding the patient's personal, medical, and safety needs. Pt answered complex yes/no questions with 80% acc Independently     2. Pt will follow 1-2 step directions given orally with 70% accuracy with min cues to increase the pt's ability to follow directions provided by caregivers for safe follow through with ADLs. Pt followed 1 step directions with objects with 100% acc  Followed 2-step directions with objects 75% acc independently and increased to 100% acc with min verbal cues. 3.Pt will complete confrontational naming tasks with 70% accuracy with mild verbal cues to help the patient express his/her basic wants and needs.    Completed confrontational naming of low frequency pictures with 100% acc standby cues, using circumlocution strategy Independently for two self corrections! 4. Pt will name 10/10 items in a concrete category with min verbal cues to promote semantic organization, neuroplasticity, and the efficiency of word finding for expression of the patient's wants, needs, feelings, and ideas. Pt named 10/10 items in concrete category with min verbal cues. Had mild repetition of some items. 5. Pt will be educated on word-finding strategies, and use them in structured and unstructured tasks in 60% of given opportunities with mild verbal cues to help the pt express his/her personal, safety, and medical needs in the presence of language deficits. Pt used circumlocution strategy 2-3 times during session with Mod I- standby cues. 6. Pt will increase average content words in sentence to 5 words independently using picture probe to increase functional communication of wants/needs and expand utterance length. Response Elaboration Training (RET) was completed to target improved word retrieval and utterance length in conversation. Not addressed   7. Patient will read single functional words with 80% acc and min verbal cues to increase functional reading of information  Pt ID single words: 100% acc Mod I compared to previous 67% acc Ind. Pt match word to picture with field of 2 words: 60% acc Ind  Pt reports working on letter ID at home and that numbers continue to be difficult. Pt expressed wanting to work on reading comprehension. 8. Pt will match picture to single written words with 80% acc min cues to increase patient functional reading abilities   Pain Assessment:  Initial Assessment:  Patient denies pain. Re-assessment:  Patient denies pain. Plan:  Goals updated    Patient/Caregiver Education:  Patient/Caregiver educated on session. Caregiver observed session. Patient/Caregiver provided with home program: Encouraged to continue with letter ID and reading of short words.      Time

## 2020-11-10 ENCOUNTER — HOSPITAL ENCOUNTER (OUTPATIENT)
Dept: SPEECH THERAPY | Age: 75
Setting detail: THERAPIES SERIES
Discharge: HOME | End: 2020-11-10
Payer: MEDICARE

## 2020-11-10 ENCOUNTER — HOSPITAL ENCOUNTER (OUTPATIENT)
Dept: PHYSICAL THERAPY | Age: 75
Setting detail: THERAPIES SERIES
Discharge: HOME | End: 2020-11-10
Payer: MEDICARE

## 2020-11-10 PROCEDURE — 92507 TX SP LANG VOICE COMM INDIV: CPT

## 2020-11-10 PROCEDURE — 97116 GAIT TRAINING THERAPY: CPT

## 2020-11-10 PROCEDURE — 97112 NEUROMUSCULAR REEDUCATION: CPT

## 2020-11-10 PROCEDURE — 97110 THERAPEUTIC EXERCISES: CPT

## 2020-11-10 NOTE — PROGRESS NOTES
[address] [address]  Outpatient Physical Therapy    Treatment Note        Date: 11/10/2020  Patient: Braxton Mcgowan  : 1945  ACCT #: [de-identified]  Referring Practitioner: Mathew Hall MD  Diagnosis: L intracranial hemorrhage, right weakness    Visit Information:  PT Visit Information  Onset Date: 20  PT Insurance Information: Medicare; [employer]  Total # of Visits Approved: (23 Ar[address] [place], [employer] 30 visits)  Total # of Visits to Date: 6  Plan of Care/Certification Expiration Date: 20  No Show: 0  Canceled Appointment: 0  Progress Note Counter:  (PN due 20)    Subjective: Pt 's family brought in resume order last week and is cleared for therapies. Vision is still affected, however speech has improved since. HEP Compliance:  [] Good [x] Fair [] Poor [] Reports not doing due to:    Vital Signs  Patient Currently in Pain: No   Pain Screening  Patient Currently in Pain: No    OBJECTIVE:   Exercises  Exercise 2: 2way SLR Heriberto x10 ( occ Min A for control of Rt LE)  Exercise 4: Bridges x15  Exercise 5: Duffy = 48/56  Exercise 6: gait training (see ambulation for details)  Exercise 7: static standing balance, NBOS with one UE support x15sec, semi-tandem with one UE support; x30sec  Exercise 8: Gait Drills:F/L/R/March/tandem with difficulty  Exercise 9: 6\" hurdles with difficulty clearing RLE lat to left  Exercise 10:  Step and reach fwd x 5 Heriberto  Exercise 20: HEP: continue with current    Ambulation 1  Surface: carpet  Device: (Hurricane)  Assistance: Stand by assistance, Contact guard assistance  Quality of Gait: difficulty cooridinating with LT ue Rt le, improved fluidity with sequencing in Rt ue with cane  Gait Deviations: Slow Sanaz, Decreased step length, Decreased step height  Distance: 125ft         Stairs  # Steps : 8  Stairs Height: 6\"  Rails: Left ascending  Device: No Device  Assistance: Stand by assistance  Comment: Reciprocal pattern ascending and descending    Strength: [] NT  [x] MMT completed:  Strength RLE  R Hip Flexion: 4-/5  R Hip Extension: 3+/5  R Hip ABduction: 4-/5  R Knee Flexion: 4+/5  R Knee Extension: 5/5  R Ankle Dorsiflexion: 4+/5  Strength LLE  L Hip Flexion: 4+/5  L Hip Extension: 3+/5  L Hip ABduction: 4+/5  L Knee Flexion: 5/5  L Knee Extension: 5/5  L Ankle Dorsiflexion: 5/5     *Indicates exercise, modality, or manual techniques to be initiated when appropriate    Assessment: Body structures, Functions, Activity limitations: Decreased functional mobility , Decreased strength, Decreased safe awareness, Decreased endurance, Decreased cognition, Decreased balance, Decreased coordination, Decreased posture  Assessment: Pt presents to therapy with Sister and Hurricane vs. wheeled walker. Pt madan's improvement in all goals, meeting [facility] goal this date. Trialed gait training with Hurricane with pt demonstrating difficulty cooridinating sequence, improved with cane in Rt hand. Pt demo's improved dynamic balance with SBA<> CGA for minor lob possibly d/t decreased visual field. Pt would benefit from continued therapy to progeress strength, balance and gait safety and quality. Treatment Diagnosis: impaired balance, impaired gait, decreased LE strength          Goals:  Short term goals  Time Frame for Short term goals: 4 weeks  Short term goal 1: Patient will be independent with bed mobility. Short term goal 2: Patient will be independent with transfers. Short term goal 3: Patient will be independent with HEP. Long term goals  Time Frame for Long term goals : 8 weeks  Long term goal 1: Patient will increase strength in bilateral LEs >/= 4+/5 for improved ambulation and transfers. Long term goal 2: Patient will ambulate with ww 200 ft independently with improved bilateral symmetry. Long term goal 3: Patient will ascend/descend 12 steps using one HR with supervision.   Long term goal 4: Duffy balance >/= 45/56 to demonstrate improved

## 2020-11-10 NOTE — PROGRESS NOTES
[address]     Ph: [phone]  Fax: [phone]    [] Certification  [] Recertification []  Plan of Care  [x] Progress Note [] Discharge      To: Estrada Smiley MD      From:  John Lopez PT DPT  Patient: Jovana Ace     : 1945  Diagnosis: L intracranial hemorrhage, right weakness     Date: 11/10/2020  Treatment Diagnosis: impaired balance, impaired gait, decreased LE strength    Plan of Care/Certification Expiration Date: 20  Progress Report Period from:  2020  to 11/10/2020    Total # of Visits to Date: 11   No Show: 0    Canceled Appointment: 0     OBJECTIVE:   Short Term Goals - Time Frame for Short term goals: 4 weeks    Goals Current/Discharge status  Met   Short term goal 1: Patient will be independent with bed mobility. Independent [x] yes  [] no   Short term goal 2: Patient will be independent with transfers. Pt Supervision to Independent [] yes  [x] no   Short term goal 3: Patient will be independent with HEP. Needs progression [] yes  [x] no     Long Term Goals - Time Frame for Long term goals : 8 weeks  Goals Current/ Discharge status Met   Long term goal 1: Patient will increase strength in bilateral LEs >/= 4+/5 for improved ambulation and transfers. Strength RLE  R Hip Flexion: 4-/5  R Hip Extension: 3+/5  R Hip ABduction: 4-/5  R Knee Flexion: 4+/5  R Knee Extension: 5/5  R Ankle Dorsiflexion: 4+/5  Strength LLE  L Hip Flexion: 4+/5  L Hip Extension: 3+/5  L Hip ABduction: 4+/5  L Knee Flexion: 5/5  L Knee Extension: 5/5  L Ankle Dorsiflexion: 5/5          [] yes  [x] no   Long term goal 2: Patietn will ambulate with LRD 200ft independently with improved bilateral step length and symmetry.      Ambulation 1  Surface: carpet  Device: (Hurricane)  Assistance: Stand by assistance, Contact guard assistance  Quality of Gait: difficulty cooridinating with LT ue Rt le, improved fluidity with sequencing in Rt ue with cane  Gait Deviations: Slow Sanaz, Decreased step length, Decreased step height  Distance: 125ft    [] yes  [x] no   Long term goal 3: Patient will ascend/descend 12 steps using one HR with supervision. Stairs  # Steps : 8  Stairs Height: 6\"  Rails: Left ascending  Device: No Device  Assistance: Stand by assistance  Comment: Reciprocal pattern ascending and descending [] yes  [x] no   Long term goal 4: Duffy balance >/= 45/56 to demonstrate improved static balance. Duffy = 48/56 [x] yes  [] no   Long term goal 5: DGI >/= 18/24 to demonstrate improved dynamic balance. NT this visit due to time [] yes  [x] no        Body structures, Functions, Activity limitations: Decreased functional mobility , Decreased strength, Decreased safe awareness, Decreased endurance, Decreased cognition, Decreased balance, Decreased coordination, Decreased posture  Assessment: Pt presents to therapy with Sister and Hurricane vs. wheeled walker. Pt demo's improvement in all goals, meeting [employer] goal this date. Trialed gait training with Hurricane with pt demonstrating difficulty cooridinating sequence, improved with cane in Rt hand. Pt demo's improved dynamic balance with SBA<> CGA for minor lob possibly d/t decreased visual field. Pt would benefit from continued therapy to progeress strength, balance and gait safety and quality. Updated goals this visit due to significant improvements observed. Continue with POC.   Discharge Recommendations: Continue to assess pending progress           PLAN: [x] Evaluate and Treat  Frequency/Duration:  Plan  Times per week: 2  Plan weeks: 8  Current Treatment Recommendations: Strengthening, Functional Mobility Training, Transfer Training, Endurance Training, Gait Training, Stair training, Neuromuscular Re-education, Manual Therapy - Soft Tissue Mobilization, Manual Therapy - Joint Manipulation, Home Exercise Program, Safety Education & Training, Patient/Caregiver Education & Training, Equipment Evaluation, Education, & procurement, Modalities     Precautions:                            Patient Status:[x] Continue/ Initiate plan of Care    [] Discharge PT. Recommend pt continue with HEP. [] Additional visits requested, Please re-certify for additional visits:          Signature:Obj Info by: Electronically signed by Edward Coe PTA on 11/10/20 at 12:24 PM EST  Electronically signed by Ryan Hancock PT on 11/10/2020 at 2:43 PM        If you have any questions or concerns, please don't hesitate to call. Thank you for your referral.    I have reviewed this plan of care and certify a need for medically necessary rehabilitation services.     Physician Signature:__________________________________________________________  Date:  Please sign and return

## 2020-11-10 NOTE — PROGRESS NOTES
[facility] Outpatient  Speech Language Pathology  Adult Daily Note    Manas Goodman  : 1945    Date: 11/10/2020    Visit Information:  SLP Insurance Information: Medicare  Total # of Visits Approved: 35  Total # of Visits to Date: 11  No Show: 0  Canceled Appointment: 1      Plan of care signed (Y/N):     Yes    Certification Period: 20-20  Plan of Care Visit # 11      Interventions used this date:  Expressive Language, Receptive Language, Instruction in Compensatory Strategies and Caregiver education    Subjective:  Pt was accompanied to therapy by sister [facility] this date. Sister reports playing a simple card game with patient and noticing increased recognition of numbers. Sister reported good recognition of capital letters and still having difficulty with lower case letters. Patient is writing simple short sentences at home to try and communicate, but then can't comprehend what she wrote if she tries to read it. Patient reports having an easier time reading longer more complex words compared to short/simple words. Alert and Cooperative       Objective/Assessment:   Patient progressing towards goals:  1. Pt will describe a low frequency object by two attributes with 80% acc with mild cues to promote use of circumlocution strategy and help the patient express his/her basic personal, safety, and medical wants and needs. 2. Pt will provide two similarities and two differences for two given words/items with 80% accuracy with mild cues in order to help strengthen semantic organization, neuroplasticity, and the patient's ability to utilize circumlocution for expression of complex wants, needs, feelings, and ideas. 3. Pt will complete convergent and divergent naming tasks with 80% accuracy with mild cues to promote semantic organization and the overall effectiveness and efficiency for expression of his/her wants, needs, feelings, and ideas.    4.  Pt will increase average content words in sentence to 5 words independently using picture probe to increase functional communication of wants/needs and expand utterance length. Response Elaboration Training (RET) was completed to target improved word retrieval and utterance length in conversation.   5. Patient will read single functional words with 80% acc and min verbal cues to increase functional reading of information  Read single functional words in all lowercase patient was 40% acc I and then when written in all capital letters patient read words with 90% acc. Patient then matched the picture to the word read with 100% acc I.   6. Pt will match picture to single written words with 80% acc min cues to increase patient functional reading abilities   Word ID in a field of 3 was 100% acc, field of 4 was 100% acc, field of 6 was 75% acc    SLP trialed Copy and Recall Treatment (CART) this date with set 1 pictures. Pt named all pictures 100% acc was unable to write words to dictation, copied words with 75% acc recognizing 1 error and recalled words with 75% acc. Pt was perseverative on letters. Add goal:   7. Patient will complete set 1 of CART with 80% acc min assist to increase functional writing and spelling for communication    Pain Assessment:  Initial Assessment:  Patient denies pain. Re-assessment:  Patient denies pain. Plan:  Goals updated    Patient/Caregiver Education:  Patient/Caregiver educated on session. Caregiver observed session.   Patient/Caregiver provided with home program: CART Set 1 pictures to practice at home  Time in: 1115  Time out:1200  Minutes seen: 45      Signature: Electronically signed by ARRON Pritchard on 11/10/2020 at 11:15 AM

## 2020-11-12 ENCOUNTER — HOSPITAL ENCOUNTER (OUTPATIENT)
Dept: SPEECH THERAPY | Age: 75
Setting detail: THERAPIES SERIES
Discharge: HOME | End: 2020-11-12
Payer: MEDICARE

## 2020-11-12 ENCOUNTER — HOSPITAL ENCOUNTER (OUTPATIENT)
Dept: OCCUPATIONAL THERAPY | Age: 75
Setting detail: THERAPIES SERIES
Discharge: HOME | End: 2020-11-12
Payer: MEDICARE

## 2020-11-12 PROCEDURE — 92507 TX SP LANG VOICE COMM INDIV: CPT

## 2020-11-12 PROCEDURE — 97530 THERAPEUTIC ACTIVITIES: CPT

## 2020-11-12 NOTE — PROGRESS NOTES
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 Pt seen with sister present. \"When I was here before I couldn't do this. \" \"It may take me three or four times to do something but I do it. \"  Pt's sister reports that patient is utilizing pronouns incorrectly. Example saying \"Him\" instead of \"Her\". Pain rating:   Pre-treatment pain:    0/10    Action for pain:   No action necessary. Pain after treatment:      0/10         Focus of treatment was on the following:   bilateral coordination, fine motor/dexterity, visual/perceptual and visual field location     Objective:    Treatment Activity: Pt assessed for updates to POC results as follows:       Right     Left      Current Initial   Current Initial    23#  18.3#     36#  Palmar  6#  6.67#     10#  Lateral  8#  N/T     N/T    9 Hole  44.3 sec N/T   32.7sec N/T  Box & Blocks 34  N/T   40  N/T    Pt completed draw a clock x 2 trials. Pt completed first trial with numbers 12 and 6 in correct position and other numbers were transposed to the incorrect side of clock, example 11 where 1 should be. Pt completed a second trial after therapist identified errors and Pt was able to place numbers in correct spots but when reviewing continued to perseverate and change numbers. Pt exhibiting decreased right/left discrimination with hands. Education/HEP: Pt and patient's sister educated for clock method of plate set up for increase ease of self feeding tasks. Pt educated for positioning self in room to increase ease of directing visual field.   Pt educated for tasks/games to allow patient to increase ability to attend to visual field and ability to scan visual field. Pt and sister in fair understanding. Discussed previous HEP: no, N/A    Comments:     Assessment:   Pt tolerated treatment well. Plan:   Update POC    Goals:LTG 1 : Patient  will be Supervised with all recommended HEP's, adaptive strategies, and adaptive techniques. LTG 2 : Patient  will increase R  strength from current by 15 lbs to increase performance with I/ADL's. LTG 3 : Patient  will increase R pinch strength from current by 4 lbs to increase performance with I/ADL's. LTG 4 : Patient  will increase dexterity in R hand as observed by 9 hole peg test by completing in 25 seconds to increase performance with I/ADL's. LTG 5 : Patient  will increase B UE coordination as observed by box and blocks by completing 50 to increase performance with I/ADL's. LTG 6 : Patient will improve  RUE sensation and/or utilize compensatory techniques for safe completion of self-care as projected. LTG 7 : Patient will scan environment to the right side with environmental cues to increase safety and performance with functional activities. LTG 9: Pt will be IND using adaptive utensils to increase self-feeding skills.            Limestone McHenry Deselich, OTR/L   [id number]  1:09 PM Electronically signed by LIAM Leos/MIRIAM on 22/85/95 at 1:49 PM EST

## 2020-11-12 NOTE — PROGRESS NOTES
[facility] Out[facility]  Speech Language Pathology  Adult Daily Note    Efrain Terrell  : 1945    Date: 2020    Visit Information:  SLP Insurance Information: Medicare  Total # of Visits Approved: 35  Total # of Visits to Date: 12  No Show: 0  Canceled Appointment: 1      Plan of care signed (Y/N):     Yes    Certification Period: 20-20  Plan of Care Visit # 12      Interventions used this date:  Expressive Language, Receptive Language, Instruction in Compensatory Strategies and Caregiver education    Subjective:  Pt was accompanied to therapy by [facility] this date. Alert and Cooperative       Objective/Assessment:   Patient progressing towards goals:  1. Pt will describe a low frequency object by two attributes with 80% acc with mild cues to promote use of circumlocution strategy and help the patient express his/her basic personal, safety, and medical wants and needs. Patient described low frequency objects with 2-3 attributes with min-mod verbal cues. 2. Pt will provide two similarities and two differences for two given words/items with 80% accuracy with mild cues in order to help strengthen semantic organization, neuroplasticity, and the patient's ability to utilize circumlocution for expression of complex wants, needs, feelings, and ideas. Named one similarities/differences with 80% acc and min-mod verbal cues   3. Pt will complete convergent and divergent naming tasks with 80% accuracy with mild cues to promote semantic organization and the overall effectiveness and efficiency for expression of his/her wants, needs, feelings, and ideas. Not addressed   4. Pt will increase average content words in sentence to 5 words independently using picture probe to increase functional communication of wants/needs and expand utterance length.   Response Elaboration Training (RET) was completed to target improved word retrieval and utterance length in conversation. Goal Met, increase: Pt will increase average content words in sentence to 8 words independently using picture probe to increase functional communication of wants/needs and expand utterance length. Response Elaboration Training (RET) was completed to target improved word retrieval and utterance length in conversation. With open-ended action picture stimuli, the patient initially formed sentences with 6.3 content words on average (minimum: 2 words, maximum: 9 words for Step 1). After SLP intervention which included shaping, modeling, reinforcing, and eliciting elaboration, the patient was able to form sentences with 8 content words on average (minimum: 5 words, maximum: 9 words for Step 6). Over 10 trials today, the quality of responses were noted to have increased details and sentence structure compared to previous dates prior to bone flap replacement surgery. 5. Patient will read single functional words with 80% acc and min verbal cues to increase functional reading of information    6. Pt will match picture to single written words with 80% acc min cues to increase patient functional reading abilities   Pt matched single words in field of two to picture with 100% acc with Mod I. Noted words were all lowercase, which was difficult for patient to read on 11/10.   7. Patient will complete set 1 of CART with 80% acc min assist to increase functional writing and spelling for communication    Pain Assessment:  Initial Assessment:  Patient denies pain. Re-assessment:  Patient denies pain. Plan:  Goals updated    Patient/Caregiver Education:  Patient/Caregiver educated on session. Caregiver observed session.   Patient/Caregiver provided with home program: Word to picture matching and descriptive activity   Time in: 1000  Time out:1045  Minutes seen: 45      Signature: Electronically signed by ARRON Davis on 11/12/2020 at 10:58 AM

## 2020-11-13 ENCOUNTER — HOSPITAL ENCOUNTER (OUTPATIENT)
Dept: OCCUPATIONAL THERAPY | Age: 75
Setting detail: THERAPIES SERIES
Discharge: HOME | End: 2020-11-13
Payer: MEDICARE

## 2020-11-13 ENCOUNTER — HOSPITAL ENCOUNTER (OUTPATIENT)
Dept: PHYSICAL THERAPY | Age: 75
Setting detail: THERAPIES SERIES
Discharge: HOME | End: 2020-11-13
Payer: MEDICARE

## 2020-11-13 PROCEDURE — 97530 THERAPEUTIC ACTIVITIES: CPT

## 2020-11-13 PROCEDURE — 97110 THERAPEUTIC EXERCISES: CPT

## 2020-11-13 PROCEDURE — 97112 NEUROMUSCULAR REEDUCATION: CPT

## 2020-11-13 NOTE — PROGRESS NOTES
OCCUPATIONAL THERAPY PROGRESS NOTE  [x]  [address] [facility] Rahul Jam[address]        Ph: [phone]         Fax: [phone]          []  [address], [address] [place] [address]         Ph: [phone]         Fax: [phone]        [] Certification     [] Recertification     [] Plan of Care    [x] Progress Note        Date: 2020    To:Referring Practitioner: Dr. Catherine Rollins MD            From: Jearline Fleischer, OTR/L  Patient: Nathan Griffiths       : 1945  MRN: [id number]  Diagnosis:Diagnosis:    L intracranial hemorrhage, R weakness, R neglect, R vision impairment   Date of eval: 9/10/2020    Visit Information:   Onset Date: 20  OT Insurance Information: Medicare  Total # of Visits Approved: 34(35 Visits OT, Max (1 used))  Progress Note Counter: 10/12    Last POC date: n/a   Reporting period: 10/9/2020 to 2020                            Assessment:    Goals Current/Discharge status  Met   Long term goal 1: Patient will be Supervised with all recommended HEP's, adaptive strategies, and adaptive techniques. Ongoing [] Met  [x] Partially Met  [] Not Met   Long term goal 2: Patient will increase R  strength from current by 10 lbs to increase performance with I/ADL's. eval 18.3 lbs,  current 23 lbs as measure by Savanah Shaw on 20  [] Met  [] Partially Met  [] Not Met   Long term goal 3: Patient will increase R pinch strength from current by 5 lbs to increase performance with I/ADL's. eval 6.67 lbs palmar and NT for lateral pinch  on eval, current 6 lbs and 8 lbs respectively as measure by Savanah Shaw on 20  [] Met  [] Partially Met  [] Not Met   Long term goal 4: Patient will increase dexterity in R hand as observed by 9 hole peg test by completing WFL to increase performance with I/ADL's.  NT on eval, current 44.3 seconds as measure by Savanah Shaw on 20 [] Met  [] Partially Met  [] Not Met   Long term goal 5: Patient will increase RUE coordination as observed by box and blocks by completing WFL to increase performance with I/ADL's. NT on eval, current 34 blocks in 1 min as measure by Bk Villa on 11/12/20 [] Met  [] Partially Met  [] Not Met   Long term goals 6: Patient will improve  RUE sensation and/or utilize compensatory techniques for safe completion of self-care as projected. Pt denies numbness and tingling on this date. [] Met  [x] Partially Met  [] Not Met   Long term goal 7: Patient will scan environment to the right side without verbal cues to increase safety and performance with functional activities. Pt with less verbal cues to scan to on this date. [] Met  [x] Partially Met  [] Not Met   LTG 8 : Pt will complete clock drawing test for further assessment of visual perceptual skills. Per Bk Villa on 11/12: \"Pt completed first trial with numbers 12 and 6 in correct position and other numbers were transposed to the incorrect side of clock, example 11 where 1 should be. Pt completed a second trial after therapist identified errors and Pt was able to place numbers in correct spots but when reviewing continued to perseverate and change numbers. Pt exhibiting decreased right/left discrimination with hands. \" [] Met  [] Partially Met  [] Not Met   LTG 9: Pt will be IND using adaptive utensils to increase self-feeding skills. Pt with some assist (set up with verbal cues). [] Met  [x] Partially Met  [] Not Met                                Frequency/Duration: Time Frame for Long term goals : 2 x/week for 12 weeks      Rehab Potential: [] Excellent [x] Good     [] Fair [] Poor      Patient Status: [x] Continue/Initate plan of Care   []  Discharge   []  Additional visits requested, please re-certify for additional visits        Electronically signed by:   MEHRDAD Atkinson 11/13/2020 12:30 PM    If you have any questions or concerns, please don't hesitate to call.  Thank you for your referral.

## 2020-11-13 NOTE — PROGRESS NOTES
Occupational Therapy  Daily Note     Name: Nathan Griffiths  : 1945  MRN: [id number]  Diagnosis:    L intracranial hemorrhage, R weakness, R neglect, R vision impairment    Visit Information:   Onset Date: 20  OT Insurance Information: Medicare  Total # of Visits Approved: 34(35 Visits OT, Max (1 used))  Progress Note Counter: 10/12    Date: 2020  OT Therapeutic activities 61 minutes for 4 unit(s), CPT 15926     OT Individual Minutes  Time In: 1000  Time Out: 1101  Minutes: 64    Referring Practitioner: Dr. Catherine Rollins MD    Subjective:  Pt with sister. \"I like this game (Spot it). \"      Pain rating:   Pre-treatment pain:    0/10    Action for pain:   No action necessary. Pain after treatment:      Pt denies pain         Focus of treatment was on the following:   coordination, strengthening  right , visual motor, visual/perceptual and scanning environment      Objective:    Treatment Activity:     Bean bag toss at 6 ft: Trial one: 15/27, trial 2:  accuracy. Pt standing to complete. Pt kept hitting hand on table. Pt able to reach outside AMY to obtain bags from table without LOB. Pt seated and used 2 lb resistive clip to obtain beads set on R side and on 10x10 foam board. Pt with Min difficulty securing beads in clip and aligning beads to board. Pt at times covers or squint/close L eye. Pt reported has double vision, and is difficult to see boar. Spot it game for visual perceptual and scanning activity pt completed 13 cards in 15 minutes. Pt did better with activity then on first attempt in previous sessions. Pt reported enjoyed activity. Pt with fair pace to point or verbalize matches on cards. Discussed previous HEP: yes, Pt verbally confirmed compliant with HEP's Asked aboout how often to complete. Recommended to pick a few of previous HEP's and activities to complete daily, but not to do all everyday. Pt verbalized understanding.      Comments: Pt not sure if has written hand out for theraputty exercises. Stated will look at home and report if unable to find. Therapist will provide another copy. Assessment:   Pt tolerated treatment well. Plan:   Continue POC    Goals:     Long term goals  Time Frame for Long term goals : 2 x/week for 12 weeks  Long term goal 1: Patient will be Supervised with all recommended HEP's, adaptive strategies, and adaptive techniques. Long term goal 2: Patient will increase R  strength from current by 10 lbs to increase performance with I/ADL's. Long term goal 3: Patient will increase R pinch strength from current by 5 lbs to increase performance with I/ADL's. Long term goal 4: Patient will increase dexterity in R hand as observed by 9 hole peg test by completing WFL to increase performance with I/ADL's. Long term goal 5: Patient will increase RUE coordination as observed by box and blocks by completing WFL to increase performance with I/ADL's. Long term goals 6: Patient will improve  RUE sensation and/or utilize compensatory techniques for safe completion of self-care as projected. Long term goal 7: Patient will scan environment to the right side without verbal cues to increase safety and performance with functional activities. Long term goal 8: Pt will complete clock drawing test for further assessment of visual perceptual skills. Long term goal 9: Pt will be IND using adaptive utensils to increase self-feeding skills.     LIAM Light/L   11/13/2020  12:29 PM

## 2020-11-13 NOTE — PROGRESS NOTES
[address] [address]  Outpatient Physical Therapy    Treatment Note        Date: 2020  Patient: Bay Page  : 1945  ACCT #: [de-identified]  Referring Practitioner: Patty Ugalde MD  Diagnosis: L intracranial hemorrhage, right weakness    Visit Information:  PT Visit Information  Onset Date: 20  PT Insurance Information: Medicare; [address] [employer]  Total # of Visits Approved: (BMN, [facility] 30 visits)  Total # of Visits to Date: 15  Plan of Care/Certification Expiration Date: 20  No Show: 0  Progress Note Due Date: 20  Canceled Appointment: 0  Progress Note Counter:  (PN due 20)    Subjective: Pt reports ambulating around the block with HHA from Sister. States vision is still double at times with vision cut on Rt side. Sees Neuro opthamalogist late Dec.     HEP Compliance:  [x] Good [] Fair [] Poor [] Reports not doing due to:    Vital Signs  Patient Currently in Pain: No   Pain Screening  Patient Currently in Pain: No    OBJECTIVE:   Exercises  Exercise 1: Lateral hip flexion over 6'' jake x10 in seated  Exercise 8: Gait Drills:F/L/R/March unsupported  Exercise 10: Single stepping over S/C Fwd/ Lat x10 unsupported  Exercise 11: DGI: 15/24  Exercise 12: STS EC x5, STS feet on foam EO x10  Exercise 20: HEP: lateral hip flexion, single stepping                *Indicates exercise, modality, or manual techniques to be initiated when appropriate    Assessment: Body structures, Functions, Activity limitations: Decreased functional mobility , Decreased strength, Decreased safe awareness, Decreased endurance, Decreased cognition, Decreased balance, Decreased coordination, Decreased posture  Assessment: Treatment focused on higher level gait and balance activities. Pt scoring 15/24 on DGI, some tasks limited by vision such as descending stairs and negotiating around obstacles as pt reports double vision with Rt side neglect.  Challenged with stepping over S/C due to decreased foot clearance. Treatment Diagnosis: impaired balance, impaired gait, decreased LE strength  Prognosis: Good       Goals:  Short term goals  Time Frame for Short term goals: 4 weeks  Short term goal 1: Patient will be independent with bed mobility. Short term goal 2: Patient will be independent with transfers. Short term goal 3: Patient will be independent with HEP. Long term goals  Time Frame for Long term goals : 8 weeks  Long term goal 1: Patient will increase strength in bilateral LEs >/= 4+/5 for improved ambulation and transfers. Long term goal 2: Jess[place] will ambulate with LRD 200ft independently with improved bilateral step length and symmetry. Long term goal 3: Patient will ascend/descend 12 steps using one HR with supervision. Long term goal 4: Duffy balance >/= 45/56 to demonstrate improved static balance. Long term goal 5: DGI >/= 18/24 to demonstrate improved dynamic balance. Progress toward goals: Balance, gait    POST-PAIN       Pain Rating (0-10 pain scale):  0 /10   Location and pain description same as pre-treatment unless indicated. Action: [] NA   [] Perform HEP  [] Meds as prescribed  [] Modalities as prescribed   [] Call Physician     Frequency/Duration:  Plan  Times per week: 2  Plan weeks: 8  Current Treatment Recommendations: Strengthening, Functional Mobility Training, Transfer Training, Endurance Training, Gait Training, Stair training, Neuromuscular Re-education, Manual Therapy - Soft Tissue Mobilization, Manual Therapy - Joint Manipulation, Home Exercise Program, Safety Education & Training, Patient/Caregiver Education & Training, Equipment Evaluation, Education, & procurement, Modalities     Pt to continue current HEP. See objective section for any therapeutic exercise changes, additions or modifications this date.          PT Individual Minutes  Time In: 1100  Time Out: 1200  Minutes: 60  Timed Code Treatment Minutes: 58 Minutes  Procedure Minutes:0 Timed Activity Minutes Units   Ther Ex 18 1   Neuro 40 3       Signature:  Electronically signed by Jaci Pedraza PTA on 11/13/20 at 9:45 AM EST

## 2020-11-16 ENCOUNTER — HOSPITAL ENCOUNTER (OUTPATIENT)
Dept: OCCUPATIONAL THERAPY | Age: 75
Setting detail: THERAPIES SERIES
Discharge: HOME | End: 2020-11-16
Payer: MEDICARE

## 2020-11-16 ENCOUNTER — HOSPITAL ENCOUNTER (OUTPATIENT)
Dept: PHYSICAL THERAPY | Age: 75
Setting detail: THERAPIES SERIES
Discharge: HOME | End: 2020-11-16
Payer: MEDICARE

## 2020-11-16 PROCEDURE — 97530 THERAPEUTIC ACTIVITIES: CPT

## 2020-11-16 PROCEDURE — 97112 NEUROMUSCULAR REEDUCATION: CPT

## 2020-11-16 PROCEDURE — 97110 THERAPEUTIC EXERCISES: CPT

## 2020-11-16 NOTE — PROGRESS NOTES
together to create a string of beads. Patient hand min to mod difficulty and required increased time. Patient then engaged in [employer] and coordination activity. With 0.5 lb weights on wrists, patient unscrewed 5 conduits and then placed them on the pole tower. Patient then removed them from the tower and screwed them back together. Patient reported little fatigue at end of activity. Patient with min difficulty on 4/5 and mod difficulty on 5th conduit. Discussed previous HEP: yes, Pt verbally confirmed compliant with HEP's    Comments:     Assessment:   Pt tolerated treatment well. Plan:   Continue POC    Goals:     Long term goals  Time Frame for Long term goals : 2 x/week for 12 weeks  Long term goal 1: Patient will be Supervised with all recommended HEP's, adaptive strategies, and adaptive techniques. Long term goal 2: Patient will increase R  strength from current by 10 lbs to increase performance with I/ADL's. Long term goal 3: Patient will increase R pinch strength from current by 5 lbs to increase performance with I/ADL's. Long term goal 4: Patient will increase dexterity in R hand as observed by 9 hole peg test by completing WFL to increase performance with I/ADL's. Long term goal 5: Patient will increase RUE coordination as observed by box and blocks by completing WFL to increase performance with I/ADL's. Long term goals 6: Patient will improve  RUE sensation and/or utilize compensatory techniques for safe completion of self-care as projected. Long term goal 7: Patient will scan environment to the right side without verbal cues to increase safety and performance with functional activities. Long term goal 8: Pt will complete clock drawing test for further assessment of visual perceptual skills. Long term goal 9: Pt will be IND using adaptive utensils to increase self-feeding skills.     Signature: Electronically signed by MEHRDAD Odell on 11/16/2020 at 2:30 PM

## 2020-11-16 NOTE — PROGRESS NOTES
functional motor planning activities. Pt with increased difficulty processing Left vs Right depiste tactile cues. Improving abilities to step over various heights suggesting improved foot clearance. Progressed gait with higher level drills and dual tasking, pt demonstrates decreased samuel with increase SLS time with dual tasking. Treatment Diagnosis: impaired balance, impaired gait, decreased LE strength  Prognosis: Good       Goals:  Short term goals  Time Frame for Short term goals: 4 weeks  Short term goal 1: Patient will be independent with bed mobility. Short term goal 2: Patient will be independent with transfers. Short term goal 3: Patient will be independent with HEP. Long term goals  Time Frame for Long term goals : 8 weeks  Long term goal 1: Patient will increase strength in bilateral LEs >/= 4+/5 for improved ambulation and transfers. Long term goal 2: Patient will ambulate with LRD 200ft independently with improved bilateral step length and symmetry. Long term goal 3: Patient will ascend/descend 12 steps using one HR with supervision. Long term goal 4: Duffy balance >/= 45/56 to demonstrate improved static balance. Long term goal 5: DGI >/= 18/24 to demonstrate improved dynamic balance. Progress toward goals: Balance, coordination    POST-PAIN       Pain Rating (0-10 pain scale):   0/10   Location and pain description same as pre-treatment unless indicated.    Action: [x] NA   [] Perform HEP  [] Meds as prescribed  [] Modalities as prescribed   [] Call Physician     Frequency/Duration:  Plan  Times per week: 2  Plan weeks: 8  Current Treatment Recommendations: Strengthening, Functional Mobility Training, Transfer Training, Endurance Training, Gait Training, Stair training, Neuromuscular Re-education, Manual Therapy - Soft Tissue Mobilization, Manual Therapy - Joint Manipulation, Home Exercise Program, Safety Education & Training, Patient/Caregiver Education & Training, Equipment Evaluation, Education, & procurement, Modalities     Pt to continue current HEP. See objective section for any therapeutic exercise changes, additions or modifications this date.          PT Individual Minutes  Time In: 1400  Time Out: 1500  Minutes: 60  Timed Code Treatment Minutes: 58 Minutes  Procedure Minutes: 0     Timed Activity Minutes Units   Ther Ex 10 1   Neuro 48 3       Signature:  Electronically signed by Heriberto Vargas PTA on 11/16/20 at 9:21 AM EST

## 2020-11-17 ENCOUNTER — HOSPITAL ENCOUNTER (OUTPATIENT)
Dept: OCCUPATIONAL THERAPY | Age: 75
Setting detail: THERAPIES SERIES
Discharge: HOME | End: 2020-11-17
Payer: MEDICARE

## 2020-11-17 ENCOUNTER — APPOINTMENT (OUTPATIENT)
Dept: PHYSICAL THERAPY | Age: 75
End: 2020-11-17
Payer: MEDICARE

## 2020-11-17 ENCOUNTER — HOSPITAL ENCOUNTER (OUTPATIENT)
Dept: SPEECH THERAPY | Age: 75
Setting detail: THERAPIES SERIES
Discharge: HOME | End: 2020-11-17
Payer: MEDICARE

## 2020-11-17 PROCEDURE — 97530 THERAPEUTIC ACTIVITIES: CPT

## 2020-11-17 PROCEDURE — 92507 TX SP LANG VOICE COMM INDIV: CPT

## 2020-11-17 NOTE — PROGRESS NOTES
double vision\" (per patient). Patient created 5 words in 13 minutes with min to mod difficulty. Discussed previous HEP: yes, Pt verbally confirmed compliant with HEP's    Comments:     Assessment:   Pt tolerated treatment well. Patient able to complete more Spot It cards than at previous session on 11/13/2020. Patient tolerated increased wrist weight for right UE strengthening activity. Patient would benefit from continued OT services to continue to address deficits and make progress towards goals. Plan:   Continue POC    Goals:     Long term goals  Time Frame for Long term goals : 2 x/week for 12 weeks  Long term goal 1: Patient will be Supervised with all recommended HEP's, adaptive strategies, and adaptive techniques. Long term goal 2: Patient will increase R  strength from current by 10 lbs to increase performance with I/ADL's. Long term goal 3: Patient will increase R pinch strength from current by 5 lbs to increase performance with I/ADL's. Long term goal 4: Patient will increase dexterity in R hand as observed by 9 hole peg test by completing WFL to increase performance with I/ADL's. Long term goal 5: Patient will increase RUE coordination as observed by box and blocks by completing WFL to increase performance with I/ADL's. Long term goals 6: Patient will improve  RUE sensation and/or utilize compensatory techniques for safe completion of self-care as projected. Long term goal 7: Patient will scan environment to the right side without verbal cues to increase safety and performance with functional activities. Long term goal 8: Pt will complete clock drawing test for further assessment of visual perceptual skills. Long term goal 9: Pt will be IND using adaptive utensils to increase self-feeding skills.     Signature: Electronically signed by MEHRDAD Watkins on 11/17/2020 at 12:34 PM

## 2020-11-17 NOTE — PROGRESS NOTES
[facility]  Speech Language Pathology  Adult Daily Note    Crissy Canseco  : 1945    Date: 2020    Visit Information:  SLP Insurance Information: Medicare  Total # of Visits Approved: 35  Total # of Visits to Date: 13  No Show: 0  Canceled Appointment: 1      Plan of care signed (Y/N):     Yes    Certification Period: 20-20  Plan of Care Visit # 13      Interventions used this date:  Expressive Language, Receptive Language, Instruction in Compensatory Strategies and Caregiver education    Subjective:  Pt was accompanied to therapy by sister Ba Landers this date. Reported working on [employer] Set 1 and descriptive naming task for HEP. Alert and Cooperative       Objective/Assessment:   Patient progressing towards goals:  1. Pt will describe a low frequency object by two attributes with 80% acc with mild cues to promote use of circumlocution strategy and help the patient express his/her basic personal, safety, and medical wants and needs. Not addressed   2. Pt will provide two similarities and two differences for two given words/items with 80% accuracy with mild cues in order to help strengthen semantic organization, neuroplasticity, and the patient's ability to utilize circumlocution for expression of complex wants, needs, feelings, and ideas. Not addressed   3. Pt will complete convergent and divergent naming tasks with 80% accuracy with mild cues to promote semantic organization and the overall effectiveness and efficiency for expression of his/her wants, needs, feelings, and ideas. Completed concrete divergent naming task with 100% acc I  4. Pt will increase average content words in sentence to 8 words independently using picture probe to increase functional communication of wants/needs and expand utterance length.   Not addressed   5. Patient will read single functional words with 80% acc and min verbal cues to increase functional reading of information  Pt read singe words all in lowercase with 58% acc I and increased to 91% acc with min verbal cues. Pt is able to read word when given sound/letter cue or clinician spells out word  Recognized lowercase letters with 75% acc I and increased to 100% acc with min cues  6. Pt will match picture to single written words with 80% acc min cues to increase patient functional reading abilities   Matched pictures to single words in a field of 3 with 100% acc     7. Patient will complete set 1 of CART with 80% acc min assist to increase functional writing and spelling for communication. Completed CART set 1  Name: 100% acc I  Write: 5/6 acc with standby cues and 3 self-corrections  Copy: 1/1x and recall 1/1x    Pain Assessment:  Initial Assessment:  Patient denies pain. Re-assessment:  Patient denies pain. Plan:  Goals updated    Patient/Caregiver Education:  Patient/Caregiver educated on session. Caregiver observed session.   Patient/Caregiver provided with home program: simple sentence to picture matching    Time in: 1000  Time out:1045  Minutes seen: 45      Signature: Electronically signed by Binnie Gottron, SLP on 11/17/2020 at 10:54 AM

## 2020-11-17 NOTE — PROGRESS NOTES
OCCUPATIONAL THERAPY PROGRESS NOTE  [x]  [address] [address]        Ph: [phone]         Fax: [phone]          []  [facility]         [address]         Ph: [phone]         Fax: [phone]        [] Certification     [x] Recertification     [] Plan of Care    [x] Progress Note        Date: 2020    To:Referring Practitioner: Dr. Renzo Coronado MD            From: Angelica Miller, OTR/L  Patient: Daquan Avila       : 1945  MRN: [id number]  Diagnosis:Diagnosis:    L intracranial hemorrhage, R weakness, R neglect, R vision impairment   Date of eval: 9/10/2020     Visit Information:   Onset Date: 20  OT Insurance Information: Medicare  Total # of Visits Approved: 34(35 visits OT, Max (1 used))  Progress Note Due Date: 20  Progress Note Counter:     Last POC date: n/a      Reporting period: 10/9/2020 to 2020                            Assessment:  as measured by Kellee Perales on 2020  Goals Current/Discharge status  Met   Long term goal 1: Patient will be Supervised with all recommended HEP's, adaptive strategies, and adaptive techniques. Ongoing []? Met  [x]? Partially Met  []? Not Met   Long term goal 2: Patient will increase R  strength from current by 10 lbs to increase performance with I/ADL's. eval 18.3 lbs,  current 23 lbs as measure by Dhaval Paige on 20  []? Met  []? Partially Met  []? Not Met   Long term goal 3: Patient will increase R pinch strength from current by 5 lbs to increase performance with I/ADL's. eval 6.67 lbs palmar and NT for lateral pinch  on eval, current 6 lbs and 8 lbs respectively as measure by Dhaval Paige on 20  []? Met  []? Partially Met  []?  Not Met   Long term goal 4: Patient will increase dexterity in R hand as observed by 9 hole peg test by completing WFL to increase performance with I/ADL's. NT on eval, current 44.3 seconds as measure by Parag Sloan on 11/12/20 []? Met  []? Partially Met  []? Not Met   Long term goal 5: Patient will increase RUE coordination as observed by box and blocks by completing WFL to increase performance with I/ADL's. NT on eval, current 34 blocks in 1 min as measure by Parag Sloan on 11/12/20 []? Met  []? Partially Met  []? Not Met   Long term goals 6: Patient will improve  RUE sensation and/or utilize compensatory techniques for safe completion of self-care as projected. Pt denies numbness and tingling on this date. []? Met  [x]? Partially Met  []? Not Met   Long term goal 7: Patient will scan environment to the right side without verbal cues to increase safety and performance with functional activities.    Pt with less verbal cues to scan to on this date. []? Met  [x]? Partially Met  []? Not Met   LTG 8 : Pt will complete clock drawing test for further assessment of visual perceptual skills. Per Parag Sloan on 11/12: \"Pt completed first trial with numbers 12 and 6 in correct position and other numbers were transposed to the incorrect side of clock, example 11 where 1 should be.  Pt completed a second trial after therapist identified errors and Pt was able to place numbers in correct spots but when reviewing continued to perseverate and change numbers.  Pt exhibiting decreased right/left discrimination with hands. \" []? Met  []? Partially Met  []? Not Met   LTG 9: Pt will be IND using adaptive utensils to increase self-feeding skills.  Pt with some assist (set up with verbal cues). []? Met  [x]? Partially Met  []?  Not Met                                                                    Frequency/Duration: Time Frame for Long term goals : 2 x/week for 12 weeks           On eval:  AM-PAC Inpatient Daily Activity Raw Score: 14  AM-PAC Inpatient ADL T-Scale Score : 33.39  ADL Inpatient CMS 0-100% Score: 59.67  ADL Inpatient CMS G-Code Modifier : CK    Score currently: AM-PAC Inpatient Daily Activity Raw Score: 17  AM-PAC Inpatient ADL T-Scale Score : 37.26  ADL Inpatient CMS 0-100% Score: 50.11  ADL Inpatient CMS G-Code Modifier : CK      TREATMENT PLAN:    [x] Evaluate & Treat  [x] Re-evaluation  [] Pain Management  [] Edema Management  [] Wound Care/Scar Management  [x] ADL Training  [] Tendon Repair Program  [] Aquatic Therapy  [] Instruction/Application of energy conservation, work simplification, joint protection, body mechanics   [x] Pt able to work with Conrad Rose  [x] D/C plan: Will assess pt after established visits to determine need for continued therapy. [x] Neuromuscular Re-education  [] Tissue (stress) Loading Program  [x] PROM/Stretching/AAROM/AROM  [] Splinting  [] Desensitization  [x] Strengthening/Graded Therapeutic Activity  [x] Coordination/Dexterity Training  [x] Manual Techniques  [x] Instruction in HEP  [x] Modalities: [] Ultrasound [] Infrared                 [] Hot/cold pack [] Paraffin                  [] Electrical stimulation                  [] Other:                                 Frequency/Duration: Time Frame for Long term goals : 2 x/week for 12 weeks      Rehab Potential: [] Excellent [x] Good     [] Fair [] Poor      Patient Status: [x] Continue/Initate plan of Care   []  Discharge   [x]  Additional visits requested, please re-certify for additional visits        Electronically signed by: MEHRDAD Parisi 11/17/2020 3:30 PM    If you have any questions or concerns, please don't hesitate to call. Thank you for your referral.      I have reviewed this plan of care and certify a need for medically necessary rehabilitation services.     Physician Signature:__________________________________________________________    Date: 11/17/2020  Please sign and return

## 2020-11-19 ENCOUNTER — HOSPITAL ENCOUNTER (OUTPATIENT)
Dept: PHYSICAL THERAPY | Age: 75
Setting detail: THERAPIES SERIES
Discharge: HOME | End: 2020-11-19
Payer: MEDICARE

## 2020-11-19 ENCOUNTER — HOSPITAL ENCOUNTER (OUTPATIENT)
Dept: SPEECH THERAPY | Age: 75
Setting detail: THERAPIES SERIES
Discharge: HOME | End: 2020-11-19
Payer: MEDICARE

## 2020-11-19 PROCEDURE — 97110 THERAPEUTIC EXERCISES: CPT

## 2020-11-19 PROCEDURE — 92507 TX SP LANG VOICE COMM INDIV: CPT

## 2020-11-19 PROCEDURE — 97112 NEUROMUSCULAR REEDUCATION: CPT

## 2020-11-19 NOTE — PROGRESS NOTES
[address] [address]  Outpatient Physical Therapy    Treatment Note        Date: 2020  Patient: Kelsey Rajan  : 1945  ACCT #: [de-identified]  Referring Practitioner: Edie Swann MD  Diagnosis: L intracranial hemorrhage, right weakness    Visit Information:  PT Visit Information  Onset Date: 20  PT Insurance Information: Medicare; [id number]  Total # of Visits Approved: (BMN, [employer] 30 visits)  Total # of Visits to Date: 15  Plan of Care/Certification Expiration Date: 20  No Show: 0  Canceled Appointment: 0  Progress Note Counter:  (PN due 20)    Subjective: Pt reports feeling wobbly and unsure of herself at times when walking. Compliant with HEP though difficulty with coordination activities given last visit. HEP Compliance:  [x] Good [] Fair [] Poor [] Reports not doing due to:    Vital Signs  Patient Currently in Pain: No   Pain Screening  Patient Currently in Pain: No    OBJECTIVE:   Exercises  Exercise 2: Rt SLR over 6'' jake with improving Rt LE control  Exercise 3: Single LE HS curls on Pball-  Rt to improve control x10  Exercise 4: Bridges on Pball x10  Exercise 7: Static standing: SLS 2-5'' Heriberto, tandem with difficulty obtaining position  Exercise 8: Gait Drills:March and , march with opp UE/LE lift, tandem, cross overs  Exercise 11: Foam: static standing balance FT/FA 30sec, EC 10sec x2, marching with SBA  Exercise 12: Catch/throw ball toss to therapist without LOB; 3/5 catching  Exercise 13: Dual tasking: ball toss to self with ambulation  Exercise 16: Coordination activities: seated cross crawls, LAQ with opp punches, marches with opp UE lift x10 ea with and without categorizing  Exercise 18: NDT: STS from mat, half STS x10 ea with Lt foot on 6'' box  Exercise 20: HEP: SLR over object, hip circles         *Indicates exercise, modality, or manual techniques to be initiated when appropriate    Assessment:         Body structures, Functions, Activity limitations: Decreased functional mobility , Decreased strength, Decreased safe awareness, Decreased endurance, Decreased cognition, Decreased balance, Decreased coordination, Decreased posture  Assessment: Continued focus on coordination and balance activities. Improving Rt LE control with strengthening exercises. Pt demonstrating difficulty with multitasking such as contralateral movements in seated or standing. Occ tasks are difficult for patient due to vision deficits such as tossing a ball to self though is improving with stepping strategies when LOB occurs. Treatment Diagnosis: impaired balance, impaired gait, decreased LE strength  Prognosis: Good       Goals:  Short term goals  Time Frame for Short term goals: 4 weeks  Short term goal 1: Patient will be independent with bed mobility. Short term goal 2: Patient will be independent with transfers. Short term goal 3: Patient will be independent with HEP. Long term goals  Time Frame for Long term goals : 8 weeks  Long term goal 1: Patient will increase strength in bilateral LEs >/= 4+/5 for improved ambulation and transfers. Long term goal 2: Patient will ambulate with LRD 200ft independently with improved bilateral step length and symmetry. Long term goal 3: Patient will ascend/descend 12 steps using one HR with supervision. Long term goal 4: Duffy balance >/= 45/56 to demonstrate improved static balance. Long term goal 5: DGI >/= 18/24 to demonstrate improved dynamic balance. Progress toward goals: Balance, dualtasking    POST-PAIN       Pain Rating (0-10 pain scale):   0/10   Location and pain description same as pre-treatment unless indicated.    Action: [x] NA   [] Perform HEP  [] Meds as prescribed  [] Modalities as prescribed   [] Call Physician     Frequency/Duration:  Plan  Times per week: 2  Plan weeks: 8  Current Treatment Recommendations: Strengthening, Functional Mobility Training, Transfer Training, Endurance Training, Gait Training, Stair training, Neuromuscular Re-education, Manual Therapy - Soft Tissue Mobilization, Manual Therapy - Joint Manipulation, Home Exercise Program, Safety Education & Training, Patient/Caregiver Education & Training, Equipment Evaluation, Education, & procurement, Modalities     Pt to continue current HEP. See objective section for any therapeutic exercise changes, additions or modifications this date.          PT Individual Minutes  Time In: 7656  Time Out: 1200  Minutes: 57  Timed Code Treatment Minutes: 57 Minutes  Procedure Minutes:0   Timed Activity Minutes Units   Ther Ex 17 1   Neuro 40 3       Signature:  Electronically signed by Ca Dunlap PTA on 11/19/20 at 12:17 PM EST

## 2020-11-19 NOTE — PROGRESS NOTES
[facility] Outpatient  Speech Language Pathology  Adult Daily Note    Lenny Bauer  : 1945    Date: 2020    Visit Information:  SLP Insurance Information: Medicare  Total # of Visits Approved: 35  Total # of Visits to Date: 14  No Show: 0  Canceled Appointment: 1      Plan of care signed (Y/N):     Yes    Certification Period: 20-20  Plan of Care Visit # 14      Interventions used this date:  Expressive Language, Receptive Language, Instruction in Compensatory Strategies and Caregiver education    Subjective:  Pt was accompanied to therapy by sister Brendon Olson this date. Reported working on CART Set 1 with good accuracy and simple sentence to picture matching with increased time to read sentences out loud. Alert and Cooperative       Objective/Assessment:   Patient progressing towards goals:  1. Pt will describe a low frequency object by two attributes with 80% acc with mild cues to promote use of circumlocution strategy and help the patient express his/her basic personal, safety, and medical wants and needs. Not addressed   2. Pt will provide two similarities and two differences for two given words/items with 80% accuracy with mild cues in order to help strengthen semantic organization, neuroplasticity, and the patient's ability to utilize circumlocution for expression of complex wants, needs, feelings, and ideas. Not addressed   3. Pt will complete convergent and divergent naming tasks with 80% accuracy with mild cues to promote semantic organization and the overall effectiveness and efficiency for expression of his/her wants, needs, feelings, and ideas. Completed abstract divergent naming task naming 10 items Independently and increased to 15 items with min verbal cues   4. Pt will increase average content words in sentence to 8 words independently using picture probe to increase functional communication of wants/needs and expand utterance length.   Response Elaboration Training (RET) was completed to target improved word retrieval and utterance length in conversation. With open-ended action picture stimuli, the patient initially formed sentences with 5.9 content words on average (minimum: 3 words, maximum: 14 words for Step 1). After SLP intervention which included shaping, modeling, reinforcing, and eliciting elaboration, the patient was able to form sentences with 6.7 content words on average (minimum: 5 words, maximum: 14 words for Step 6). 5. Patient will read single functional words with 80% acc and min verbal cues to increase functional reading of information    6. Pt will match picture to single written words with 80% acc min cues to increase patient functional reading abilities       7. Patient will complete set 1 of CART with 80% acc min assist to increase functional writing and spelling for communication. Completed CART set 1  Name: 100% acc I  Write: 100% acc with min cues. Pt was cueing self for letters using previous key words. Trialed CART set 2  Name: 5/6 acc I  Write: 2/6 acc I  Copy: 100% acc min assist  Recall: 100% acc min assist      Pain Assessment:  Initial Assessment:  Patient denies pain. Re-assessment:  Patient denies pain. Plan:  Goals updated    Patient/Caregiver Education:  Patient/Caregiver educated on session. Caregiver observed session.   Patient/Caregiver provided with home program: CART Set 2    Time in: 1000  Time SAVANNA:6700  Minutes seen: 39      Signature: Electronically signed by ARRON Zamarripa on 11/19/2020 at 4:11 PM

## 2020-11-23 ENCOUNTER — HOSPITAL ENCOUNTER (OUTPATIENT)
Dept: OCCUPATIONAL THERAPY | Age: 75
Setting detail: THERAPIES SERIES
Discharge: HOME | End: 2020-11-23
Payer: MEDICARE

## 2020-11-23 ENCOUNTER — HOSPITAL ENCOUNTER (OUTPATIENT)
Dept: PHYSICAL THERAPY | Age: 75
Setting detail: THERAPIES SERIES
Discharge: HOME | End: 2020-11-23
Payer: MEDICARE

## 2020-11-23 PROCEDURE — 97112 NEUROMUSCULAR REEDUCATION: CPT

## 2020-11-23 PROCEDURE — 97116 GAIT TRAINING THERAPY: CPT

## 2020-11-23 PROCEDURE — 97530 THERAPEUTIC ACTIVITIES: CPT

## 2020-11-23 NOTE — PROGRESS NOTES
Katherin gillespie [address]     Ph: [phone]  Fax: [phone]    [] Certification  [x] Recertification [x]  Plan of Care  [] Progress Note [] Discharge      To: Wilmer Oppenheim MD      From:  Faheem Hawyood, PT, DPT  Patient: Brenna Cooper     : 1945  Diagnosis: L intracranial hemorrhage, right weakness     Date: 2020  Treatment Diagnosis: impaired balance, impaired gait, decreased LE strength    Plan of Care/Certification Expiration Date: 21  Progress Report Period from:  11/10/2020  to 2020    Total # of Visits to Date: 15   No Show: 0    Canceled Appointment: 0     OBJECTIVE:   Short Term Goals - Time Frame for Short term goals: 4 weeks    Goals Current/Discharge status  Met   Short term goal 1: Patient will be independent with bed mobility. Bed mobility  Rolling to Left: Independent  Rolling to Right: Independent  Supine to Sit: Independent  Sit to Supine: Independent    [x] yes  [] no   Short term goal 2: Patient will be independent with transfers. Transfers  Sit to Stand: Independent  Stand to sit: Independent [x] yes  [] no   Short term goal 3: Patient will be independent with HEP. Pt reports compliance with HEP [] yes  [x] no   Short term goal 4: Patient will perform dual tasking activity independently without LOB. Updated goal 20 Patient has difficulty with dual tasking. [] yes  [x] no      Short term goal 5: Patient will perform obstacle coarse with improved coordination of LEs independently. Updated goal 20 Patient needs improvement with LE coordination. [] yes  [x] no        Long Term Goals - Time Frame for Long term goals : 8 weeks  Goals Current/ Discharge status Met   Long term goal 1: Patient will increase strength in bilateral LEs >/= 4+/5 for improved ambulation and transfers.  Strength RLE  R Hip Flexion: 4-/5  R Hip Extension: 3+/5  R Hip ABduction: 4-/5  R Knee Flexion: 4+/5  R Knee Extension: 5/5  R Ankle Dorsiflexion: 4+/5  Strength LLE  L Hip Flexion: 4+/5  L Hip Extension: 3+/5  L Hip ABduction: 4+/5  L Knee Flexion: 5/5  L Knee Extension: 5/5  L Ankle Dorsiflexion: 5/5   [] yes  [x] no   Long term goal 2: Patient will ambulate with LRD 200ft independently with improved bilateral step length and symmetry. Ambulation 1  Surface: carpet  Device: No Device  Assistance: Modified Independent, Supervision  Quality of Gait: Supervision needed for direction changes d/t vision impairments and increased processing time needed with directions, grossly steady wtih straight path ambulation  Distance: 250 ft   [x] yes  [] no   Long term goal 3: Patient will ascend/descend 12 steps using one HR with independently. (updated goal) Stairs  # Steps : 12  Stairs Height: 6\"  Device: No Device  Assistance: Supervision  Comment: Reciprocal pattern ascending with Lt rail and descending with Rt rail   [] yes  [x] no   Long term goal 4: Duffy balance >/= 54/56 to demonstrate improved static balance. (updated goal) Duffy Balance Score: 50   [] yes  [x] no   Long term goal 5: DGI >/= 22/24 to demonstrate improved dynamic balance. (updated goal) DGI 19/24 [] yes  [x] no        Body structures, Functions, Activity limitations: Decreased functional mobility , Decreased strength, Decreased safe awareness, Decreased endurance, Decreased cognition, Decreased balance, Decreased coordination, Decreased posture  Assessment: Pt's strength grossly unchanged since last measures. Pt demonstrates improvements in Duffy and DGI vs last measures, meeting goals on both. Continues to demonstrate challenge with dual tasking and coordination activities, as well as Rt stance activites. Pt would benefit from continued therapy to address deficits to improve tolerance to functional activities.   Specific instructions for Next Treatment: total of 8 visits           PLAN: [x] Evaluate and Treat  Frequency/Duration:  Plan  Times per week: 2 x week for the next 2 weeks than 1x week for the next 4 weeks  Specific instructions for Next Treatment: total of 8 visits  Current Treatment Recommendations: Strengthening, Functional Mobility Training, Transfer Training, Endurance Training, Gait Training, Stair training, Neuromuscular Re-education, Manual Therapy - Soft Tissue Mobilization, Manual Therapy - Joint Manipulation, Home Exercise Program, Safety Education & Training, Patient/Caregiver Education & Training, Equipment Evaluation, Education, & procurement, Modalities  Plan Comment: POC completed     Precautions:   none                         Patient Status:[x] Continue/ Initiate plan of Care    [] Discharge PT. Recommend pt continue with HEP. [] Additional visits requested, Please re-certify for additional visits:          Signature: Objective information by: Electronically signed by Ebenezer Caceres PTA on 11/23/20 at 1:57 PM EST  Electronically signed by Omar Rhodes PT on 11/23/2020 at 5:28 PM      If you have any questions or concerns, please don't hesitate to call. Thank you for your referral.    I have reviewed this plan of care and certify a need for medically necessary rehabilitation services.     Physician Signature:__________________________________________________________  Date:  Please sign and return

## 2020-11-23 NOTE — PROGRESS NOTES
Occupational Therapy  Daily Note     Name: Lopez Stanford  : 1945  MRN: [id number]  Diagnosis:    L intracranial hemorrhage, R weakness, R neglect, R vision impairment    Visit Information:   Onset Date: 20  OT Insurance Information: Medicare  Total # of Visits Approved: 34(35 visits OT, max (1 used prior to eval))  Progress Note Due Date: 20  Progress Note Counter:     Date: 2020  Time:   OT Therapeutic activities 60 minutes for 4 unit(s), CPT 45420       OT Individual Minutes  Time In: 1400  Time Out: 1500  Minutes: 61    Referring Practitioner: Dr. Tracey Thomas MD              Subjective: Patient's sister attended session with patient this date. Patient reports she's been working with the putty at home. Pain rating:   Pre-treatment pain:    Pt denies pain    Action for pain:   No action necessary. Pain after treatment:      Pt denies pain         Focus of treatment was on the following:   coordination, strengthening  right , visual/perceptual and scanning environment     Objective:    Treatment Activity:   Patient engaged in coordination and perception activity with PVC pip design. Patient used bilateral hands to re-create PVC pipe design from picture. Patient had mod difficulty determining which piece to use, specifically differentiating between the connector pieces. Patient required increased time for decision making for task. Patient had min difficulty connecting the pieces and no difficulty picking them up. Patient allowed extra time to self correct mistakes and encourage problem solving throughout task. Patient required min verbal cues throughout when unable to determine mistakes. Patient then engaged in right pinch/ strengthening activity with visual scanning/perception. Patient used 2# resistive clip in right hand to  blocks and stack them by color.  The blocks were scattered around the table to encourage scanning the environment, especially to the right side because of right visual field deficits. Patient had mod difficulty aligning the blocks to stack and had to squint one eye due to double vision. Patient had min difficulty depressing 2# clip. Patient reported fatigue at end of task. Patient engaged in spot it game for visual perceptual and scanning activity. Patient completed 31 cards in 12 minutes which is an improvement from 13 cards in 15 minutes on 11/13/2020 and 30 cards in 15 minutes on 11/17/2020. Pt with good pace to point or verbalize matches on cards.      Discussed previous HEP: yes, Pt verbally confirmed compliant with HEP's    Comments:     Assessment:   Pt tolerated treatment well. Plan:   Continue POC    Goals:     Long term goals  Time Frame for Long term goals : 2 x/week for 12 weeks  Long term goal 1: Patient will be Supervised with all recommended HEP's, adaptive strategies, and adaptive techniques. Long term goal 2: Patient will increase R  strength from current by 10 lbs to increase performance with I/ADL's. Long term goal 3: Patient will increase R pinch strength from current by 5 lbs to increase performance with I/ADL's. Long term goal 4: Patient will increase dexterity in R hand as observed by 9 hole peg test by completing WFL to increase performance with I/ADL's. Long term goal 5: Patient will increase RUE coordination as observed by box and blocks by completing WFL to increase performance with I/ADL's. Long term goals 6: Patient will improve  RUE sensation and/or utilize compensatory techniques for safe completion of self-care as projected. Long term goal 7: Patient will scan environment to the right side without verbal cues to increase safety and performance with functional activities. Long term goal 8: Pt will complete clock drawing test for further assessment of visual perceptual skills. Long term goal 9: Pt will be IND using adaptive utensils to increase self-feeding skills.     Signature: Electronically signed by LIAM Shine/L on 11/23/2020 at 3:48 PM

## 2020-11-23 NOTE — PROGRESS NOTES
[address] [address]  Outpatient Physical Therapy    Treatment Note        Date: 2020  Patient: Marlyn Hernandez  : 1945  ACCT #: [de-identified]  Referring Practitioner: Jeremy Wylie MD  Diagnosis: L intracranial hemorrhage, right weakness    Visit Information:  PT Visit Information  Onset Date: 20  PT Insurance Information: Medicare; [id number]  Total # of Visits Approved: (BMN, [place] 30 visits)  Total # of Visits to Date: 13  Plan of Care/Certification Expiration Date: 20  No Show: 0  Canceled Appointment: 0  Progress Note Counter: 15/16 (PN due 20)    Subjective: Pt's sister present during tx. Reports pt has been working on dual tasking activities at home.      HEP Compliance:  [x] Good [] Fair [] Poor [] Reports not doing due to:    Vital Signs  Patient Currently in Pain: No   Pain Screening  Patient Currently in Pain: No    OBJECTIVE:   Exercises  Exercise 5: Duffy = 50/56, DGI   Exercise 12: Catch/throw ball toss to therapist without LOB; 3/5 catching  Exercise 13: Dual tasking: ball toss to self in standing  Exercise 14: Standing kicking moving ball /10 Rt,  Lt  Exercise 16: Coordination activities: seated cross crawls, seated march with opp UE lift, figure 8 around LEs  Exercise 17: Stepping to river stones- therapist calling out which LE to which color with moderate difficulty processing  Exercise 20: HEP: Seated figure 8s, cross crawl, opp UE/LE lifts    Bed mobility  Rolling to Left: Independent  Rolling to Right: Independent  Supine to Sit: Independent  Sit to Supine: Independent    Ambulation 1  Surface: carpet  Device: No Device  Assistance: Modified Independent, Supervision  Quality of Gait: Supervision needed for direction changes d/t vision impairments and increased processing time needed with directions, grossly steady wtih straight path ambulation  Distance: 250 ft    Stairs  # Steps : 12  Stairs Height: 6\"  Device: No Device  Assistance: Supervision  Comment: Reciprocal pattern ascending with Lt rail and descending with Rt rail    Transfers  Sit to Stand: Independent  Stand to sit: Independent    Strength: [] NT  [x] MMT completed:  Strength RLE  R Hip Flexion: 4-/5  R Hip Extension: 3+/5  R Hip ABduction: 4-/5  R Knee Flexion: 4+/5  R Knee Extension: 5/5  R Ankle Dorsiflexion: 4+/5  Strength LLE  L Hip Flexion: 4+/5  L Hip Extension: 3+/5  L Hip ABduction: 4+/5  L Knee Flexion: 5/5  L Knee Extension: 5/5  L Ankle Dorsiflexion: 5/5    *Indicates exercise, modality, or manual techniques to be initiated when appropriate    Assessment: Body structures, Functions, Activity limitations: Decreased functional mobility , Decreased strength, Decreased safe awareness, Decreased endurance, Decreased cognition, Decreased balance, Decreased coordination, Decreased posture  Assessment: Pt's strength grossly unchanged since last measures. Pt demonstrates improvements in Duffy and DGI vs last measures, meeting goals on both. Continues to demonstrate challenge with dual tasking and coordination activities, as well as Rt stance activites. Pt would benefit from continued therapy to address deficits to improve tolerance to functional activities. Treatment Diagnosis: impaired balance, impaired gait, decreased LE strength        Goals:  Short term goals  Time Frame for Short term goals: 4 weeks  Short term goal 1: Patient will be independent with bed mobility. Short term goal 2: Patient will be independent with transfers. Short term goal 3: Patient will be independent with HEP. Long term goals  Time Frame for Long term goals : 8 weeks  Long term goal 1: Patient will increase strength in bilateral LEs >/= 4+/5 for improved ambulation and transfers. Long term goal 2: Patient will ambulate with LRD 200ft independently with improved bilateral step length and symmetry. Long term goal 3: Patient will ascend/descend 12 steps using one HR with supervision.   Long term goal 4: Duffy balance >/= 45/56 to demonstrate improved static balance. Long term goal 5: DGI >/= 18/24 to demonstrate improved dynamic balance. Progress toward goals: Tested for PN  POST-PAIN       Pain Rating (0-10 pain scale):  0 /10   Location and pain description same as pre-treatment unless indicated. Action: [] NA   [x] Perform HEP  [] Meds as prescribed  [] Modalities as prescribed   [] Call Physician     Frequency/Duration:  Plan  Times per week: 2  Plan weeks: 8  Current Treatment Recommendations: Strengthening, Functional Mobility Training, Transfer Training, Endurance Training, Gait Training, Stair training, Neuromuscular Re-education, Manual Therapy - Soft Tissue Mobilization, Manual Therapy - Joint Manipulation, Home Exercise Program, Safety Education & Training, Patient/Caregiver Education & Training, Equipment Evaluation, Education, & procurement, Modalities  Plan Comment: POC completed     Pt to continue current HEP. See objective section for any therapeutic exercise changes, additions or modifications this date.          PT Individual Minutes  Time In: 1500  Time Out: 9038  Minutes: 64  Timed Code Treatment Minutes: 62 Minutes  Procedure Minutes: 0     Timed Activity Minutes Units   Neuro 45 3   Gait 17 1       Signature:  Electronically signed by Drake Doll PTA on 11/23/20 at 4:25 PM EST

## 2020-11-24 ENCOUNTER — HOSPITAL ENCOUNTER (OUTPATIENT)
Dept: PHYSICAL THERAPY | Age: 75
Setting detail: THERAPIES SERIES
Discharge: HOME | End: 2020-11-24
Payer: MEDICARE

## 2020-11-24 ENCOUNTER — HOSPITAL ENCOUNTER (OUTPATIENT)
Dept: SPEECH THERAPY | Age: 75
Setting detail: THERAPIES SERIES
Discharge: HOME | End: 2020-11-24
Payer: MEDICARE

## 2020-11-24 PROCEDURE — 97116 GAIT TRAINING THERAPY: CPT

## 2020-11-24 PROCEDURE — 92507 TX SP LANG VOICE COMM INDIV: CPT

## 2020-11-24 PROCEDURE — 97112 NEUROMUSCULAR REEDUCATION: CPT

## 2020-11-24 NOTE — PROGRESS NOTES
[address] [address]  Outpatient Physical Therapy    Treatment Note        Date: 2020  Patient: Manas Goodman  : 1945  ACCT #: [de-identified]  Referring Practitioner: Jose Guillen MD  Diagnosis: L intracranial hemorrhage, right weakness    Visit Information:  PT Visit Information  Onset Date: 20  PT Insurance Information: Medicare; [employer]  Total # of Visits Approved: (23 [place], [place] 30 visits)  Total # of Visits to Date: 12  Plan of Care/Certification Expiration Date: 21  No Show: 0  Progress Note Due Date: 20  Canceled Appointment: 0  Progress Note Counter:  (PN due 20)    Subjective: Patient continues dual tasking at home, stating it is very difficult. Patient's sister from [place] is present. States patient is walking through her home, reaching for walls, furniture with good outcome, no falls. Patient states she has 2 cats, states they have not been a safety issue. Patient does state that she spills something daily, noting that if a cup is near her activity, like answering the phone, she may not see it, and will knock it over. HEP Compliance:  [x] Good [] Fair [] Poor [] Reports not doing due to:    Vital Signs  Patient Currently in Pain: No   Pain Screening  Patient Currently in Pain: No    OBJECTIVE:   Exercises  Exercise 4: Standing perturbations in standing on the floor, and standing on the [employer] foam. with noted difficulty with how to maintain balance. Verbal cues to \"take a step if you need to keep your balance. \"  Good ankle strategies noted in this exercise. Exercise 12: Catch/throw ball toss to therapist without LOB; 3/5 catching  Exercise 13: Navigating in hallway, with UE support on the walls, noting corners, doorways. Also turns to notice equipment, and noting people nearby. Patient continues with joe vision bilat, and right side neglect.   Exercise 14: pass ball between knees in sitting x20 to each side  Exercise 16: Seated UE/LE exercises:  marches, with ball in UEs, touch each lifted knee, x10.  difficulty with patient knowing Right and Left. Exercise 19: Stairs, x12, with left side rail going up. Verbalcues for reaching the right side rail, due to right side neglect, loss of visual field. *Indicates exercise, modality, or manual techniques to be initiated when appropriate    Assessment:   Activity Tolerance  Activity Tolerance: Patient Tolerated treatment well  Activity Tolerance: Patient requests human assist on the left side when walking. 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 Patient reports that she is pursuing prism glasses to assist with vision. Patient continues to have difficulty with \"left\" and \"right\", making following verbal directions difficult. She was able to complete standing dymanic balance activities slowly,  with stand by guard. Will continue standing balace exercises and gait instruction to improve functional skill. Treatment Diagnosis: impaired balance, impaired gait, decreased LE strength          Goals:  Short term goals  Time Frame for Short term goals: 4 weeks  Short term goal 1: Patient will be independent with bed mobility. Short term goal 2: Patient will be independent with transfers. Short term goal 3: Patient will be independent with HEP. Short term goal 4: Patient will perform dual tasking activity independently without LOB. (updated goal)  Short term goal 5: Patient will peform obstacle coarse with improved coordination of LEs independently. (updated goal)    Long term goals  Time Frame for Long term goals : 8 weeks  Long term goal 1: Patient will increase strength in bilateral LEs >/= 4+/5 for improved ambulation and transfers. Long term goal 2: Patient will ambulate with LRD 200ft independently with improved bilateral step length and symmetry. Long term goal 3: Patient will ascend/descend 12 steps using one HR with independently. (updated goal)  Long term goal 4: Duffy balance >/= 54/56 to demonstrate improved static balance. (updated goal)  Long term goal 5: DGI >/= 22/24 to demonstrate improved dynamic balance. (updated goal)  Progress toward goals: Gait, Balance    POST-PAIN       Pain Rating (0-10 pain scale):   0/10   Location and pain description same as pre-treatment unless indicated. Action: [x] NA   [] Perform HEP  [] Meds as prescribed  [] Modalities as prescribed   [] Call Physician     Frequency/Duration:  Plan  Times per week: 2 x week for the next 2 weeks than 1x week for the next 4 weeks  Specific instructions for Next Treatment: total of 8 visits  Current Treatment Recommendations: Strengthening, Functional Mobility Training, Transfer Training, Endurance Training, Gait Training, Stair training, Neuromuscular Re-education, Manual Therapy - Soft Tissue Mobilization, Manual Therapy - Joint Manipulation, Home Exercise Program, Safety Education & Training, Patient/Caregiver Education & Training, Equipment Evaluation, Education, & procurement, Modalities  Plan Comment: POC completed     Pt to continue current HEP. See objective section for any therapeutic exercise changes, additions or modifications this date.          PT Individual Minutes  Time In: 0935  Time Out: 1200  Minutes: 61  Timed Code Treatment Minutes: 61 Minutes  Procedure Minutes:0     Timed Activity Minutes Units   Ther Ex 30 2   Gait 31 2       Signature:  Electronically signed by Ean Young on 11/24/20 at 12:23 PM EST

## 2020-11-27 ENCOUNTER — APPOINTMENT (OUTPATIENT)
Dept: OCCUPATIONAL THERAPY | Age: 75
End: 2020-11-27
Payer: MEDICARE

## 2020-11-27 ENCOUNTER — APPOINTMENT (OUTPATIENT)
Dept: PHYSICAL THERAPY | Age: 75
End: 2020-11-27
Payer: MEDICARE

## 2020-11-30 ENCOUNTER — HOSPITAL ENCOUNTER (OUTPATIENT)
Dept: OCCUPATIONAL THERAPY | Age: 75
Setting detail: THERAPIES SERIES
Discharge: HOME | End: 2020-11-30
Payer: MEDICARE

## 2020-11-30 ENCOUNTER — HOSPITAL ENCOUNTER (OUTPATIENT)
Dept: PHYSICAL THERAPY | Age: 75
Setting detail: THERAPIES SERIES
Discharge: HOME | End: 2020-11-30
Payer: MEDICARE

## 2020-11-30 PROCEDURE — 97112 NEUROMUSCULAR REEDUCATION: CPT

## 2020-11-30 PROCEDURE — 97530 THERAPEUTIC ACTIVITIES: CPT

## 2020-11-30 NOTE — PROGRESS NOTES
[id number] [address]  Outpatient Physical Therapy    Treatment Note        Date: 2020  Patient: Annalee Davis  : 1945  ACCT #: [de-identified]  Referring Practitioner: Veda Patterson MD  Diagnosis: L intracranial hemorrhage, right weakness    Visit Information:  PT Visit Information  Onset Date: 20  PT Insurance Information: Medicare; [facility]  Total # of Visits Approved: (23 [employer] 30 visits)  Total # of Visits to Date: 17  Plan of Care/Certification Expiration Date: 21  No Show: 0  Canceled Appointment: 0  Progress Note Counter:  (PN due 20)    Subjective: Pt reports compliance with HEP. States working on coordination exercises and going for walks at home. Pt's sister states thinks pt with occasionally decreased Rt foot clearance with ambulation, though without LOB. HEP Compliance:  [x] Good [] Fair [] Poor [] Reports not doing due to:    Vital Signs  Patient Currently in Pain: No   Pain Screening  Patient Currently in Pain: No    OBJECTIVE:   Exercises  Exercise 1: Ambulation with spotting looking for numbers and letters on wall with use of compensatory strategies d/t vision impairments  Exercise 2: Amb with boom whacker taps and naming categories without deviations in gait  Exercise 4: Foam: head turns horizontal and vertical, trunk rotation and b/l shoulder flexion with PBall  Exercise 10: Gait drills: cross crawl marching, marching with opp UE raise, tandem x2 laps ea in // bars with intermittent fingertip support  Exercise 16: Seated UE/LE exercises:  marches, with ball in UEs, touch each lifted knee, x10 with improved coordination  Exercise 17: Stepping to river stones- therapist calling out which LE to which color with moderate difficulty processing  Exercise 18:  Toe taps on numbered box with therapist calling side and number with difficulty processing    *Indicates exercise, modality, or manual techniques to be initiated when appropriate    Assessment: Body structures, Functions, Activity limitations: Decreased functional mobility , Decreased strength, Decreased safe awareness, Decreased endurance, Decreased cognition, Decreased balance, Decreased coordination, Decreased posture  Assessment: Challenged with multi-step direction tasks d/t decreased processing. Decreased safety awareness with obstacle negotiation, occasionally bumping into objects on Rt side though without LOB. 1 LOB with standing on foam with trunk rotation with PBall with ISHAAN to correct. 1 episode of toe catch with ambulation with spotting with pt able to correct without physical assist.  Treatment Diagnosis: impaired balance, impaired gait, decreased LE strength        Goals:  Short term goals  Time Frame for Short term goals: 4 weeks  Short term goal 1: Patient will be independent with bed mobility. Short term goal 2: Patient will be independent with transfers. Short term goal 3: Patient will be independent with HEP. Short term goal 4: Patient will perform dual tasking activity independently without LOB. (updated goal)  Short term goal 5: Patient will peform obstacle coarse with improved coordination of LEs independently. (updated goal)    Long term goals  Time Frame for Long term goals : 8 weeks  Long term goal 1: Patient will increase strength in bilateral LEs >/= 4+/5 for improved ambulation and transfers. Long term goal 2: Patient will ambulate with LRD 200ft independently with improved bilateral step length and symmetry. Long term goal 3: Patient will ascend/descend 12 steps using one HR with independently. (updated goal)  Long term goal 4: Duffy balance >/= 54/56 to demonstrate improved static balance. (updated goal)  Long term goal 5: DGI >/= 22/24 to demonstrate improved dynamic balance. (updated goal)  Progress toward goals: Progressing towards all    POST-PAIN       Pain Rating (0-10 pain scale):   0/10   Location and pain description same as pre-treatment unless indicated. Action: [] NA   [x] Perform HEP  [] Meds as prescribed  [] Modalities as prescribed   [] Call Physician     Frequency/Duration:  Plan  Times per week: 2 x week for the next 2 weeks than 1x week for the next 4 weeks  Specific instructions for Next Treatment: total of 8 visits  Current Treatment Recommendations: Strengthening, Functional Mobility Training, Transfer Training, Endurance Training, Gait Training, Stair training, Neuromuscular Re-education, Manual Therapy - Soft Tissue Mobilization, Manual Therapy - Joint Manipulation, Home Exercise Program, Safety Education & Training, Patient/Caregiver Education & Training, Equipment Evaluation, Education, & procurement, Modalities     Pt to continue current HEP. See objective section for any therapeutic exercise changes, additions or modifications this date.          PT Individual Minutes  Time In: 3208  Time Out: 1600  Minutes: 57  Timed Code Treatment Minutes: 57 Minutes  Procedure Minutes: 0     Timed Activity Minutes Units   Neuro 57 4       Signature:  Electronically signed by Mary Au PTA on 11/30/20 at 5:01 PM EST

## 2020-11-30 NOTE — PROGRESS NOTES
Occupational Therapy  Daily Note     Name: Batsheva Henson  : 1945  MRN: [id number]  Diagnosis:    L intracranial hemorrhage, R weakness, R neglect, R vision impairment    Visit Information:   Onset Date: 20  OT Insurance Information: Medicare  Total # of Visits Approved: 34(35 visits OT, Max (1 used at eval))  Total # of Visits to Date: 15  Progress Note Due Date: 20  Progress Note Counter:     Date: 2020  Time:   OT Therapeutic activities 60 minutes for 4 unit(s), CPT 12797       OT Individual Minutes  Time In: 1400  Time Out: 1500  Minutes: 61    Referring Practitioner: Dr. Joann Campos MD              Subjective: Patient seen with patient's sister present. Patient reports that she has had fewer spills when carrying things lately. Pain rating:   Pre-treatment pain:    Pt denies pain    Action for pain:   No action necessary. Pain after treatment:      Pt denies pain         Focus of treatment was on the following:   coordination, strengthening right UE, visual/perceptual and scanning environment     Objective:    Treatment Activity:   Patient engaged in fine motor coordination and visual scanning activity to increase functional use of right UE and increasing scanning of right visual field. Therapist placed one pole one each side of the table and then the rings in the center of the table. Patient had to follow a pattern to place the colored rings onto the poles using her right UE. Patient had mod difficulty focusing to follow the pattern and difficulty due to double vision locating the smaller rings. Patient required increased time to complete task and mod verbal cues throughout the task for assistance with the pattern and scanning to the right side pole. Patient then engaged in fine motor coordination task with small peg board. Patient replicated pattern from paper onto small peg board.  Patient had min difficulty picking up the pegs on the right side and min to mod difficulty placing them into the peg board. Patient had mod difficulty keeping her place on the pattern and required min verbal cues to assist with place and next steps for the task. Patient then removed the pegs using 4# resistive clip. Patient had min difficulty removing the pegs. Patient reported fatigue at end of session, but reported no increase in pain. Patient's  and pinch strength assess this date with right  measuring 22.67 lbs, palmar pinch 9 lbs, and lateral pinch at 10 lbs. Patient had increase in right pinch with right  remaining similar to measurements on 11/12/2020. Discussed previous HEP: yes, Pt verbally confirmed compliant with HEP's    Comments:     Assessment:   Pt tolerated treatment well. Patient displayed increase in right pinch strength. Patient continues with decreased RUE coordination, /pinch strength, and visual/perceptual deficits (along with double vision). Patient would benefit from continued occupational therapy services to address the above deficits. Plan:   Continue POC    Goals:     Long term goals  Time Frame for Long term goals : 2 x/week for 12 weeks  Long term goal 1: Patient will be Supervised with all recommended HEP's, adaptive strategies, and adaptive techniques. Long term goal 2: Patient will increase R  strength from current by 10 lbs to increase performance with I/ADL's. Long term goal 3: Patient will increase R pinch strength from current by 5 lbs to increase performance with I/ADL's. Long term goal 4: Patient will increase dexterity in R hand as observed by 9 hole peg test by completing WFL to increase performance with I/ADL's. Long term goal 5: Patient will increase RUE coordination as observed by box and blocks by completing WFL to increase performance with I/ADL's. Long term goals 6: Patient will improve  RUE sensation and/or utilize compensatory techniques for safe completion of self-care as projected.   Long term goal 7: Patient will scan environment to the right side without verbal cues to increase safety and performance with functional activities. Long term goal 8: Pt will complete clock drawing test for further assessment of visual perceptual skills. Long term goal 9: Pt will be IND using adaptive utensils to increase self-feeding skills.     Signature: Electronically signed by MEHRDAD Valentine on 11/30/2020 at 4:18 PM

## 2020-12-01 ENCOUNTER — HOSPITAL ENCOUNTER (OUTPATIENT)
Dept: PHYSICAL THERAPY | Age: 75
Setting detail: THERAPIES SERIES
Discharge: HOME | End: 2020-12-01
Payer: MEDICARE

## 2020-12-01 ENCOUNTER — HOSPITAL ENCOUNTER (OUTPATIENT)
Dept: SPEECH THERAPY | Age: 75
Setting detail: THERAPIES SERIES
Discharge: HOME | End: 2020-12-01
Payer: MEDICARE

## 2020-12-01 PROCEDURE — 97112 NEUROMUSCULAR REEDUCATION: CPT

## 2020-12-01 PROCEDURE — 92507 TX SP LANG VOICE COMM INDIV: CPT

## 2020-12-01 NOTE — PROGRESS NOTES
content words in sentence to 8 words independently using picture probe to increase functional communication of wants/needs and expand utterance length. Response Elaboration Training (RET) was completed to target improved word retrieval and utterance length in conversation. Not addressed     5. Patient will read single functional words with 80% acc and min verbal cues to increase functional reading of information  Pt read singe words-short phrases with 70% acc I and increased to 100% acc with min verbal cues for sound-letter correspondence. All words were in lowercase. Pt matched description to picture with 100% acc I.   6. Pt will match picture to single written words with 80% acc min cues to increase patient functional reading abilities   Not addressed     7. Patient will complete set 2 of CART with 80% acc min assist to increase functional writing and spelling for communication  Copy and Recall Treatment (CART) was targeted this date using Set 2. Patient achieved 100% naming target items with standby cues, 50% writing target items, 75% copying target items and 75% recalling target items. Mastery of set is reached with 80% acc of target items. Pain Assessment:  Initial Assessment:  Patient denies pain. Re-assessment:  Patient denies pain. Plan:  Goals updated    Patient/Caregiver Education:  Patient/Caregiver educated on session. Caregiver observed session.   Patient/Caregiver provided with home program: CART set 2 homework pages    Time in: 1000  Time out:1045  Minutes seen: 39      Signature: Electronically signed by Almarie Severs, SLP on 12/1/2020 at 11:23 AM

## 2020-12-01 NOTE — PROGRESS NOTES
[address] [address]  Outpatient Physical Therapy    Treatment Note        Date: 2020  Patient: Lopez Stanford  : 1945  ACCT #: [de-identified]  Referring Practitioner: Tracey Thomas MD  Diagnosis: L intracranial hemorrhage, right weakness    Visit Information:  PT Visit Information  Onset Date: 20  PT Insurance Information: Medicare; [facility]  Total # of Visits Approved: (23 [employer] 30 visits)  Total # of Visits to Date:   Plan of Care/Certification Expiration Date: 21  No Show: 0  Progress Note Due Date: 20  Canceled Appointment: 0  Progress Note Counter: 3/8 (PN due 20)    Subjective: Pt reports feeling good atoday, however mentions she was very discouraged after having difficulty w/ coordinating stepping stones yesterday. HEP Compliance:  [x] Good [] Fair [] Poor [] Reports not doing due to:    Vital Signs  Patient Currently in Pain: No   Pain Screening  Patient Currently in Pain: No    OBJECTIVE:   Exercises  Exercise 1: Gait on track with spotting  Exercise 4: Foam: head turns horizontal and vertical, FA/FT, trunk rotation and b/l shoulder flexion with PBall, stepping on/off w/o ue support  Exercise 6: Obstacle course:  Exercise 8: Gait Drills:March and holds, march with opp UE/LE lift, tandem, Braiding  Exercise 10: Gait drills: cross crawl marching, marching with opp UE raise, tandem x2 laps ea in // bars with intermittent fingertip support  Exercise 17: Stepping to river stones/vectors/ crossingf midline- therapist calling out which LE to which color with moderate difficulty processing      *Indicates exercise, modality, or manual techniques to be initiated when appropriate    Assessment:    Body structures, Functions, Activity limitations: Decreased functional mobility , Decreased strength, Decreased safe awareness, Decreased endurance, Decreased cognition, Decreased balance, Decreased coordination, Decreased posture  Assessment: Pt continued to be challenged with multi step direction tasks with decreased processing, specifically colors. Pt demo'd increased deviations with gait pattern with head turns, improved when focused on spotting to center target. Pt with 2 minor incidents of Rt neglect, bumping arm and catching Rt foot with step ups on compliant surface. Pt able to self correct. Treatment Diagnosis: impaired balance, impaired gait, decreased LE strength  Prognosis: Good     Goals:  Short term goals  Time Frame for Short term goals: 4 weeks  Short term goal 1: Patient will be independent with bed mobility. Short term goal 2: Patient will be independent with transfers. Short term goal 3: Patient will be independent with HEP. Short term goal 4: Patient will perform dual tasking activity independently without LOB. (updated goal)  Short term goal 5: Patient will peform obstacle coarse with improved coordination of LEs independently. (updated goal)    Long term goals  Time Frame for Long term goals : 8 weeks  Long term goal 1: Patient will increase strength in bilateral LEs >/= 4+/5 for improved ambulation and transfers. Long term goal 2: Patient will ambulate with LRD 200ft independently with improved bilateral step length and symmetry. Long term goal 3: Patient will ascend/descend 12 steps using one HR with independently. (updated goal)  Long term goal 4: Duffy balance >/= 54/56 to demonstrate improved static balance. (updated goal)  Long term goal 5: DGI >/= 22/24 to demonstrate improved dynamic balance. (updated goal)  Progress toward goals:dynamic balance    POST-PAIN       Pain Rating (0-10 pain scale):   0/10   Location and pain description same as pre-treatment unless indicated.    Action: [x] NA   [x] Perform HEP  [] Meds as prescribed  [] Modalities as prescribed   [] Call Physician     Frequency/Duration:  Plan  Times per week: 2 x week for the next 2 weeks than 1x week for the next 4 weeks  Specific instructions for Next Treatment: total of 8 visits  Current Treatment Recommendations: Strengthening, Functional Mobility Training, Transfer Training, Endurance Training, Gait Training, Stair training, Neuromuscular Re-education, Manual Therapy - Soft Tissue Mobilization, Manual Therapy - Joint Manipulation, Home Exercise Program, Safety Education & Training, Patient/Caregiver Education & Training, Equipment Evaluation, Education, & procurement, Modalities  Plan Comment: 1x/wk through Dec     Pt to continue current HEP. See objective section for any therapeutic exercise changes, additions or modifications this date.     PT Individual Minutes  Time In: 1669  Time Out: 9881  Minutes: 56  Timed Code Treatment Minutes: 56 Minutes  Procedure Minutes:0     Timed Activity Minutes Units   Neuro re ed 56 4       Signature:  Electronically signed by Amaris Willis PTA on 12/1/20 at 11:13 AM EST  Electronically signed by Ivonne Luna PTA on 12/1/2020 at 12:16 PM

## 2020-12-03 ENCOUNTER — HOSPITAL ENCOUNTER (OUTPATIENT)
Dept: SPEECH THERAPY | Age: 75
Setting detail: THERAPIES SERIES
Discharge: HOME | End: 2020-12-03
Payer: MEDICARE

## 2020-12-03 PROCEDURE — 92507 TX SP LANG VOICE COMM INDIV: CPT

## 2020-12-03 NOTE — PROGRESS NOTES
mild cues to promote semantic organization and the overall effectiveness and efficiency for expression of his/her wants, needs, feelings, and ideas.   Goal met for convergent naming, continue with abstract divergent naming goal  4. Pt will increase average content words in sentence to 8 words independently using picture probe to increase functional communication of wants/needs and expand utterance length. Response Elaboration Training (RET) was completed to target improved word retrieval and utterance length in conversation. Goal met, Pt verbalized sentences about pictures with average of 8 or more content words Independently  90% of time during RET practice. 5. Patient will read single functional words with 80% acc and min verbal cues to increase functional reading of information  Progress made, Pt is reading lowercase words with 58-62% acc I and requires min-mod verbal cues to increase accuracy. 6. Pt will match picture to single written words with 80% acc min cues to increase patient functional reading abilities   Goal met, Matched short phrases to picture with 100% acc     7. Patient will complete set 2 of CART with 80% acc min assist to increase functional writing and spelling for communication  Met goal for CART Set 1 since last progress report and started goal for CART Set 2     Updated Short-Term Goals:  D/C goals 1-4, 6   Adjust goal 5: Patient will read single functional words in lowercase with 80% acc mild verbal cues to increase functional reading abilities    Add goals:   1. To increase safety awareness, judgment and complex verbal expression, pt will complete abstract reasoning tasks (i.e. Word deduction, convergent and divergent naming, similarities/differences) with 80% accuracy and min cues. 2. Pt will ID lowercase letters with 90% acc and standby cues to increase functional reading abilities    3.  Pt will read short functional phrases with 80% acc min verbal cues to increase functional reading and communication    Long-Term Goals:   1. Pt will improve her expressive language abilities to a sentence/conversation level for effective communication with familiar and unfamiliar communication partners so they may functionally communicate and express complex ideas. 2. Pt will improve her reading comprehension abilities to a phrase level for effective comprehension of functional materials. Frequency/Duration of Treatment:   Days: 2 days/week  Length of Session:  45 minutes and 60 minutes  Weeks: 12 Weeks    Rehab Potential: Good    Prognostic Factors: Motivation  Family/community support  Participation level       Goal Status: Partially Achieved      Patient Status: Continue per initial Plan of Care    Discharge Plan: home          This patients condition is expected to improve within the treatment timeframe. MODIFIED WELCH FALL RISK ASSESSMENT:    History of Falling (has patient fallen in the past 30 days?):    No (0 points)    Secondary Diagnosis (is there more than 1 medical diagnosis in patients medical history?):    Yes (15 points)    Ambulatory Aid:    No device is used (0 points)    Gait:    Normal/bedrest/wheelchair (0 points)    Mental Status:    Overestimates or forgets limitations (15 points)      Total points = 30    Fall Risk Level: medium    0 - 24: Low Risk - implement low risk fall prevention interventions    25 - 44: Medium risk  45 and higher: High Risk    JASMYN NOMS: N/A      Electronically signed by:  Electronically signed by ARRON Arroyo on 12/3/2020 at 1:06 PM       If you have any questions or concerns, please don't hesitate to call.   Thank you for your referral.      Physician Signature:________________________________Date:__________________  By signing above, therapists plan is approved by physician

## 2020-12-03 NOTE — PROGRESS NOTES
utterance length in conversation. Pt verbalized sentences about pictures with average of 8 or more content words Independently  90% of time during RET practice. Goal met    5. Patient will read single functional words with 80% acc and min verbal cues to increase functional reading of information  Pt read short phrases about picture with 2/8 no verbal cues from SLP and then required min-mod verbal cues including sound-letter correspondence. 6. Pt will match picture to single written words with 80% acc min cues to increase patient functional reading abilities   Matched short phrases to picture with 100% acc     7. Patient will complete set 2 of CART with 80% acc min assist to increase functional writing and spelling for communication  Not addressed       Pain Assessment:  Initial Assessment:  Patient denies pain. Re-assessment:  Patient denies pain. Plan:  Goals updated    Patient/Caregiver Education:  Patient/Caregiver educated on session. Caregiver observed session.   Patient/Caregiver provided with home program: CART set 2 homework pages    Time in: 1000  Time out:1045  Minutes seen: 39      Signature: Electronically signed by ARRON Barrera on 12/3/2020 at 10:55 AM

## 2020-12-04 ENCOUNTER — APPOINTMENT (OUTPATIENT)
Dept: PHYSICAL THERAPY | Age: 75
End: 2020-12-04
Payer: MEDICARE

## 2020-12-04 ENCOUNTER — HOSPITAL ENCOUNTER (OUTPATIENT)
Dept: OCCUPATIONAL THERAPY | Age: 75
Setting detail: THERAPIES SERIES
Discharge: HOME | End: 2020-12-04
Payer: MEDICARE

## 2020-12-04 PROCEDURE — 97530 THERAPEUTIC ACTIVITIES: CPT

## 2020-12-08 ENCOUNTER — HOSPITAL ENCOUNTER (OUTPATIENT)
Dept: SPEECH THERAPY | Age: 75
Setting detail: THERAPIES SERIES
Discharge: HOME | End: 2020-12-08
Payer: MEDICARE

## 2020-12-08 ENCOUNTER — HOSPITAL ENCOUNTER (OUTPATIENT)
Dept: PHYSICAL THERAPY | Age: 75
Setting detail: THERAPIES SERIES
Discharge: HOME | End: 2020-12-08
Payer: MEDICARE

## 2020-12-08 PROCEDURE — 92507 TX SP LANG VOICE COMM INDIV: CPT

## 2020-12-08 PROCEDURE — 97110 THERAPEUTIC EXERCISES: CPT

## 2020-12-08 NOTE — PROGRESS NOTES
[facility]  Speech Language Pathology  Adult Daily Note    Laurent Torres  : 1945    Date: 2020    Visit Information:  SLP Insurance Information: Medicare  Total # of Visits Approved: 35  Total # of Visits to Date: 18  No Show: 0  Canceled Appointment: 1      Plan of care signed (Y/N):     Yes    Certification Period: 20-21  Plan of Care Visit # 18      Interventions used this date:  Expressive Language, Receptive Language and Instruction in Compensatory Strategies    Subjective:    Alert and Cooperative       Objective/Assessment:   Patient progressing towards goals:  1. To increase safety awareness, judgment and complex verbal expression, pt will complete abstract reasoning tasks (i.e. Word deduction, convergent and divergent naming, similarities/differences) with 80% accuracy and min cues.   not addressed   2. Pt will ID lowercase letters with 90% acc and standby cues to increase functional reading abilities   ID lowercase letters with 96% acc with min cues, trouble with /n/  3. Pt will read short functional phrases with 80% acc min verbal cues to increase functional reading and communication  Pt completed phrase completion task with 28% acc I and increased to 100% acc with mod verbal cues   4. Patient will read single functional words in lowercase with 80% acc mild verbal cues to increase functional reading abilities  Read short sight words with 80% acc I and increased 100% acc mod cues  5. Patient will complete set 2 of CART with 80% acc min assist to increase functional writing and spelling for communication  Copy and Recall Treatment (CART) was targeted this date using Set 3. Patient achieved 100% naming target items with Ind, 67% writing target items, 100% copying target items and 100% recalling target items. Mastery of set is reached with 80% acc of target items. Pain Assessment:  Initial Assessment:  Patient denies pain.     Re-assessment:  Patient denies

## 2020-12-08 NOTE — PROGRESS NOTES
[address] [address]  Outpatient Physical Therapy    Treatment Note        Date: 2020  Patient: Caty Jones  : 1945  ACCT #: [de-identified]  Referring Practitioner: Eloise Page MD  Diagnosis: L intracranial hemorrhage, right weakness    Visit Information:  PT Visit Information  Onset Date: 20  PT Insurance Information: Medicare; [employer]  Total # of Visits Approved: ([facility] [place] [place] [facility] visits)  Total # of Visits to Date:   Plan of Care/Certification Expiration Date: 21  No Show: 0  Progress Note Due Date: 20  Canceled Appointment: 0  Progress Note Counter: 50 (PN due 20)    Subjective: Patient reports that her sister has gone back to her home. Patient states she continues to have difficulty with visual field, depth, and side neglect. No new medical history reported. HEP Compliance:  [x] Good [] Fair [] Poor [] Reports not doing due to:    Vital Signs  Patient Currently in Pain: No   Pain Screening  Patient Currently in Pain: No    OBJECTIVE:   Exercises  Exercise 6: seated reach and read with hand-written flash cards. Patient stated \"3\" was an \"8\", but was able to see the difference between the 3 and 8 with verbal cues. Exercise 14: 6 min walk test, 1000 feet on track, plus another 250 feet after the 6 min. Exercise 15: seated therapy ball toss and catch, moved to standing therapy ball toss and catch, x15 in each position. Exercise 16: stepping/ tapping multi colored steeping blocks, single steps, double and forwar/backward. Patient persists with task, requiring occasional verbal cueing, until she has the correct stepping pattern. Done in parallel bars for UE support when needed. Exercise 19: standing, marching, with clasped BUEs tapping the raised leg, with close supervision due to balance difficulties.      *Indicates exercise, modality, or manual techniques to be initiated when appropriate    Assessment:   Activity Tolerance  Activity dynamic balance. (updated goal)  Progress toward goals:    POST-PAIN       Pain Rating (0-10 pain scale):  0 /10   Location and pain description same as pre-treatment unless indicated. Action: [x] NA   [] Perform HEP  [] Meds as prescribed  [] Modalities as prescribed   [] Call Physician     Frequency/Duration:  Plan  Times per week: 2 x week for the next 2 weeks than 1x week for the next 4 weeks  Specific instructions for Next Treatment: total of 8 visits  Current Treatment Recommendations: Strengthening, Functional Mobility Training, Transfer Training, Endurance Training, Gait Training, Stair training, Neuromuscular Re-education, Manual Therapy - Soft Tissue Mobilization, Manual Therapy - Joint Manipulation, Home Exercise Program, Safety Education & Training, Patient/Caregiver Education & Training, Equipment Evaluation, Education, & procurement, Modalities  Plan Comment: 1x/wk through Dec     Pt to continue current HEP. See objective section for any therapeutic exercise changes, additions or modifications this date.          PT Individual Minutes  Time In: 3910  Time Out: 2489  Minutes: 60  Timed Code Treatment Minutes: 60 Minutes  Procedure Minutes:     Timed Activity Minutes Units   Ther Ex 60 4       Signature:  Electronically signed by Emilia Alberts on 12/8/20 at 12:57 PM EST

## 2020-12-10 ENCOUNTER — HOSPITAL ENCOUNTER (OUTPATIENT)
Dept: OCCUPATIONAL THERAPY | Age: 75
Setting detail: THERAPIES SERIES
Discharge: HOME | End: 2020-12-10
Payer: MEDICARE

## 2020-12-10 ENCOUNTER — HOSPITAL ENCOUNTER (OUTPATIENT)
Dept: SPEECH THERAPY | Age: 75
Setting detail: THERAPIES SERIES
Discharge: HOME | End: 2020-12-10
Payer: MEDICARE

## 2020-12-10 PROCEDURE — 97530 THERAPEUTIC ACTIVITIES: CPT

## 2020-12-10 PROCEDURE — 92507 TX SP LANG VOICE COMM INDIV: CPT

## 2020-12-10 NOTE — PROGRESS NOTES
[facility] Outpatient  Speech Language Pathology  Adult Daily Note    Noe White  : 1945    Date: 12/10/2020    Visit Information:  SLP Insurance Information: Medicare  Total # of Visits Approved: 35  Total # of Visits to Date: 19  No Show: 0  Canceled Appointment: 1      Plan of care signed (Y/N):     Yes    Certification Period: 20-21  Plan of Care Visit # 19      Interventions used this date:  Expressive Language, Receptive Language and Instruction in Compensatory Strategies    Subjective:  Accompanied to therapy this date by friend. Alert and Cooperative       Objective/Assessment:   Patient progressing towards goals:  1. To increase safety awareness, judgment and complex verbal expression, pt will complete abstract reasoning tasks (i.e. Word deduction, convergent and divergent naming, similarities/differences) with 80% accuracy and min cues. Completed verbal word deduction task with 80% acc min cues   2. Pt will ID lowercase letters with 90% acc and standby cues to increase functional reading abilities   ID lowercase letters with 100% acc with standby cues  3. Pt will read short functional phrases with 80% acc min verbal cues to increase functional reading and communication  Not addressed   4. Patient will read single functional words in lowercase with 80% acc mild verbal cues to increase functional reading abilities  Read short sight words with 90% acc I and increased 100% acc min cues  5. Patient will complete set 2 of CART with 80% acc min assist to increase functional writing and spelling for communication  Copy and Recall Treatment (CART) was targeted this date using Set 3. Patient achieved 100% naming target items with Ind, 100% writing target items. Mastery of set is reached with 80% acc of target items. Initiated set 2 (therapist skipped set 2 in error). Copy and Recall Treatment (CART) was targeted this date using Set 2.  Patient achieved 100% naming target items with Ind, 80% writing target items mild cues, 100% copying target items and 100% recalling target items. Mastery of set is reached with 80% acc of target items. Pain Assessment:  Initial Assessment:  Patient denies pain. Re-assessment:  Patient denies pain. Plan:  Continue with current goals    Patient/Caregiver Education:  Patient/Caregiver educated on session. Caregiver observed session.   Patient/Caregiver provided with home program: CART homework pages set 2    Time in: 1000  Time out:1045  Minutes seen: 39      Signature: Electronically signed by ARRON Rebolledo on 12/10/2020 at 10:53 AM

## 2020-12-10 NOTE — PROGRESS NOTES
Occupational Therapy  Progress Note     Name: Lenny Bauer  : 1945  MRN: [id number]  Diagnosis:    L intracranial hemorrhage, R weakness, R neglect, R vision impairment    Visit Information:   Progress Note Counter: 3/12    Date: 12/10/2020  OT Manual therapy 60 minutes for 4 unit(s), CPT 03033     OT Individual Minutes  Time In: 1100  Time Out: 1200  Minutes: 61    Referring Practitioner: Dr. Noe Washburn MD    Subjective: Pt with friend during session. Pain rating:   Pre-treatment pain:    Pt denies pain    Action for pain:   No action necessary. Pain after treatment:      Pt denies pain         Focus of treatment was on the following:   assess for progress toward goals     Objective:    Treatment Activity:     Pt able to draw clock with correct hour markers. Pt with difficulty understanding when asked to make time at 3:15. Pt wrote 3.215 off to side. Pt stated not sure what therapist wanted. Pt able to write 1-10 in correct sequence and A-G. Pt with Min VC's to scan during assessment.  & Pinch Strength  Average of  Three tries Right          Current Right   Eval Right              Norm   Left  Current Left   Eval     Left     Norm       Pranav lb. 25 18.3 Female age : 55 lbs  45 41 Female age : 41 lbs    PALMAR  Pinch  Lb. 6.67 6.66 Female age : 9.5 lbs  5 8 Female age : 8.5 lbs    LATERAL  Pinch  Lb. 7 NT Female age : 11.0 lbs  10 NT Female age : 10.0 lbs    Comments:     Coordination & Dexterity   Right eval 12/10 Norm Left Eval 12/10 Norm   Nine Hole Peg Test  (seconds) 44.3 36.9 Female age -69: 22.2 s  NT 32.1 Female age : 22.0 s    Box & Blocks  (# of blocks) 33 12 Female age : 74.5 NT 45 Female age : 72.4   Comments: L hand missed 3 from box on 12/10,  Pt reported R hand \"felt twisted\" during box and blocks on 12/10 and kept tossing blocks into divider (not high enough).  Pt not using visual compensatory techniques to gauge height of divider during testing of R side. Sensation:       [place] [employer] Monofilaments:               Right eval Right 12/10     Anterior Posterior  Anterior Posterior   Thumb 4.31 4.31 3.61 3.61   Index 4.31 4.31 3.61 3.61   Middle 4.31 4.31 3.61 3.61   Ring 4.31 4.31 3.61 3.61   Little 4.31 4.31 3.61 3.61   Palmar 4.31 4.31 3.61 3.61         Monofilament  Size Representation   Hand Threshold    2.83 Green Normal    3.61 Blue Diminished light touch   4.31 Purple  Diminished Protective Sensation   4.56 Red Loss of Protective   Sensation    6.65 Red Loss of Protective   Sensation      Comments: Pt stated occasional numbness and tingling in R hand. Pt stated usually occurs in bed. Assessment:   Pt making good  progress towards goals. Plan:   Continue POC    Goals:     Long term goals  Time Frame for Long term goals : 2 x/week for 12 weeks  Long term goal 1: Patient will be Supervised with all recommended HEP's, adaptive strategies, and adaptive techniques. Ongoing  Long term goal 2: Patient will increase R  strength from current by 10 lbs to increase performance with I/ADL's. Progress made  Long term goal 3: Patient will increase R pinch strength from current by 5 lbs to increase performance with I/ADL's. Not met  Long term goal 4: Patient will increase dexterity in R hand as observed by 9 hole peg test by completing WFL to increase performance with I/ADL's. Progress made  Long term goal 5: Patient will increase RUE coordination as observed by box and blocks by completing WFL to increase performance with I/ADL's. Not met   Long term goals 6: Patient will improve  RUE sensation and/or utilize compensatory techniques for safe completion of self-care as projected. Goal met   Long term goal 7: Patient will scan environment to the right side without verbal cues to increase safety and performance with functional activities.  Progress made  Long term goal 8: Pt will complete clock drawing test for further assessment of visual perceptual skills. Progress made  Long term goal 9: Pt will be IND using adaptive utensils to increase self-feeding skills.  Goal met     Stephania Gonzalez, OTR/L   12/10/2020  5:31 PM

## 2020-12-11 ENCOUNTER — HOSPITAL ENCOUNTER (OUTPATIENT)
Dept: OCCUPATIONAL THERAPY | Age: 75
Setting detail: THERAPIES SERIES
Discharge: HOME | End: 2020-12-11
Payer: MEDICARE

## 2020-12-11 ENCOUNTER — APPOINTMENT (OUTPATIENT)
Dept: PHYSICAL THERAPY | Age: 75
End: 2020-12-11
Payer: MEDICARE

## 2020-12-11 PROCEDURE — 97530 THERAPEUTIC ACTIVITIES: CPT

## 2020-12-11 NOTE — PROGRESS NOTES
Occupational Therapy  Daily Note     Name: Asim Grissom  : 1945  MRN: [id number]  Diagnosis:    L intracranial hemorrhage, R weakness, R neglect, R vision impairment    Visit Information:   Onset Date: 20  OT Insurance Information: Medicare  Total # of Visits Approved: 34  Total # of Visits to Date: 17  Progress Note Due Date: 21  Progress Note Counter:     Date: 2020  Time:   OT Therapeutic activities 55 minutes for 4 unit(s), CPT 98258       OT Individual Minutes  Time In: 1300  Time Out: 1441  Minutes: 54    Referring Practitioner: Dr. William Schaefer MD              Subjective: Patients friend attended session with patient. Patient stated she was interested in some arm exercises to complete. Pain rating:   Pre-treatment pain:    Pt denies pain    Action for pain:   No action necessary. Pain after treatment:      Pt denies pain         Focus of treatment was on the following:   Coordination, increasing functional use of right UE, strengthening right UE, visual/perceptual and scanning environment     Objective:    Treatment Activity:   Patient engaged in coordination and perception activity with PVC pipe design. Patient used bilateral hands to re-create PVC pipe design from picture. Patient had mod difficulty determining which piece to use, however, it was more challenging due to picture not having pipe sizes labeled. Patient required increased time for decision making for task. Patient had min difficulty connecting the pieces and no difficulty picking them up. Patient allowed extra time to self correct mistakes and encourage problem solving throughout task. Patient required min verbal cues throughout tasks.      Patient educated in upper extremity exercises for shoulder flexion, abduction, elbow flexion/extension, wrist flexion/extension, supination/pronation, and ulnar and radial deviation for right upper extremity strengthening for increased use during ADL and IADL

## 2020-12-15 ENCOUNTER — HOSPITAL ENCOUNTER (OUTPATIENT)
Dept: PHYSICAL THERAPY | Age: 75
Setting detail: THERAPIES SERIES
Discharge: HOME | End: 2020-12-15
Payer: MEDICARE

## 2020-12-15 ENCOUNTER — HOSPITAL ENCOUNTER (OUTPATIENT)
Dept: SPEECH THERAPY | Age: 75
Setting detail: THERAPIES SERIES
Discharge: HOME | End: 2020-12-15
Payer: MEDICARE

## 2020-12-15 PROCEDURE — 97112 NEUROMUSCULAR REEDUCATION: CPT

## 2020-12-15 PROCEDURE — 92507 TX SP LANG VOICE COMM INDIV: CPT

## 2020-12-15 NOTE — PROGRESS NOTES
[facility] Outpatient  Speech Language Pathology  Adult Daily Note    Otilio Diaz  : 1945    Date: 12/15/2020    Visit Information:  SLP Insurance Information: Medicare  Total # of Visits Approved: 35  Total # of Visits to Date: 20  No Show: 0  Canceled Appointment: 1      Plan of care signed (Y/N):     Yes    Certification Period: 20-21  Plan of Care Visit # 20      Interventions used this date:  Receptive Language and Instruction in Compensatory Strategies    Subjective:   was present prior to session starting and said that they had started orally spelling words to help with reading and writing. Alert and Cooperative       Objective/Assessment:   Patient progressing towards goals:  1. To increase safety awareness, judgment and complex verbal expression, pt will complete abstract reasoning tasks (i.e. Word deduction, convergent and divergent naming, similarities/differences) with 80% accuracy and min cues. Pt provided multiple definitions of words with 70% acc I and increased to 90% acc with min cues   2. Pt will ID lowercase letters with 90% acc and standby cues to increase functional reading abilities  Not addressed   3. Pt will read short functional phrases with 80% acc min verbal cues to increase functional reading and communication  Pt read short phrases with phrase completion options field of 3 with 60% acc I and increased to 90% acc min-mod cues. 4. Patient will read single functional words in lowercase with 80% acc mild verbal cues to increase functional reading abilities    5. Patient will complete set 2 of CART with 80% acc min assist to increase functional writing and spelling for communication  Completed CART set 2 homework pages with 4x errors that were corrected. Reported that spelling the word out loud when trying to correct errors was helpful. Copy and Recall Treatment (CART) was targeted this date using Set 2.  Patient achieved 100% naming target items with Ind, 80% writing target items mild cues, 100% copying target items and 100% recalling target items. Mastery of set is reached with 80% acc of target items. Pt benefited from spelling words orally prior to writing. Pain Assessment:  Initial Assessment:  Patient denies pain. Re-assessment:  Patient denies pain. Plan:  Continue with current goals    Patient/Caregiver Education:  Patient/Caregiver educated on session.   Patient/Caregiver provided with home program: Phrase completion worksheet    Time in: 1000  Time out:1045  Minutes seen: 45      Signature: Electronically signed by ARRON Pritchard on 12/15/2020 at 10:51 AM

## 2020-12-15 NOTE — PROGRESS NOTES
[address] [address]  Outpatient Physical Therapy    Treatment Note        Date: 12/15/2020  Patient: David Garg  : 1945  ACCT #: [de-identified]  Referring Practitioner: Vnaessa Storm MD  Diagnosis: L intracranial hemorrhage, right weakness    Visit Information:  PT Visit Information  Onset Date: 20  PT Insurance Information: Medicare; [employer] [employer]  Total # of Visits Approved: ([facility] [place] [place] [employer] [facility])  Total # of Visits to Date:   Plan of Care/Certification Expiration Date: 21  No Show: 0  Progress Note Due Date: 20  Canceled Appointment: 0  Progress Note Counter: 50 (PN due 20)    Subjective: Patient reports that her sister has gone back to her home. Patient states she continues to have difficulty with visual field, depth, and side neglect. No new medical history reported. HEP Compliance:  [x] Good [] Fair [] Poor [] Reports not doing due to:    Vital Signs  Patient Currently in Pain: No   Pain Screening  Patient Currently in Pain: No    OBJECTIVE:   Exercises  Exercise 1: Standing UE coordination, following directions:  reach for the blue cone, stack it here (cross body reaches, alternating UEtasks. Exercise 2: Gait:  1000 feet on track, with PT on patient's left side by patient request.  Patient able to stay in emily, good, alternating stepping, with no c/o fatigue. Exercise 3: Ball sitting:  lateral weight shifts to note patient limits for maintaining balance. Exercise 4: Ball sitting, with reaching exercises with cone reaching and stancking, bean bag reach and stack. Noted decreased either eye-hand coord with RUE, or decreased kinesthetic sense of RUE. Exercise 5: Mirrored exercises with PT moving LUE, patient mirroring with RUE with good response. When PT controled RUE, and patient had to mirror with LUE, there was noted delay, noting kinesthetic sense decrease.   Instructed patient,  of caution at the stove, and on stairs if position sense is decreased. Exercise 6: seated reach and read with hand-written flash cards. Patient stated \"3\" was an \"8\", but was able to see the difference between the 3 and 8 with verbal cues. Exercise 7: Stairs x12, with bilat rail, and with alternating stepping. Exercise 8: Standing:  ball pass around the back while marching in place. Patient noted increased difficulty passing from right to left behind the back, than from left to right behind the back. Exercise 14: 6 min walk test, 1000 feet on track, plus another 250 feet after the 6 min. Exercise 19: standing, marching, with clasped BUEs tapping the raised leg, with close supervision due to balance difficulties. Progressed to bean bag pass under raised leg when standing and marching. *Indicates exercise, modality, or manual techniques to be initiated when appropriate    Assessment:   Activity Tolerance  Activity Tolerance: Patient Tolerated treatment well  Activity Tolerance: Patient requests human assist on the left side when walking. Body structures, Functions, Activity limitations: Decreased functional mobility , Decreased strength, Decreased safe awareness, Decreased endurance, Decreased cognition, Decreased balance, Decreased coordination, Decreased posture  Assessment: Focus today was on balance, and coordination, while following directions. When given an instruction \"get the blue beanbag\", Patient was able to reach in between items to grasp the requested item. She did knock over a 4\" cone,  2x, of close to 30 requests, while standing on a foam mat, and when sitting on a therapy ball. Patient showed difficulty with kinesthetic sense, while doing therapist controled \"mirrored movements. When PT moved patient RUE, patient showed delay in mirroring the movement. Patient continues to make gains with balance, and coordination. She was noted to show improvement with repetition for all ball pass activities.   Plan is to continue therapy to improve gait, balance, and coordination. Treatment Diagnosis: impaired balance, impaired gait, decreased LE strength  Prognosis: Good  PT Education: Goals, PT Role, Plan of Care, Home Exercise Program  Patient Education: Educated patient, , on implication of decreased kinesthetic sense including safety at the stove with lit burners, and when ascending and descending stairs. Goals:  Short term goals  Time Frame for Short term goals: 4 weeks  Short term goal 1: Patient will be independent with bed mobility. Short term goal 2: Patient will be independent with transfers. Short term goal 3: Patient will be independent with HEP. Short term goal 4: Patient will perform dual tasking activity independently without LOB. (updated goal)  Short term goal 5: Patient will peform obstacle coarse with improved coordination of LEs independently. (updated goal)    Long term goals  Time Frame for Long term goals : 8 weeks  Long term goal 1: Patient will increase strength in bilateral LEs >/= 4+/5 for improved ambulation and transfers. Long term goal 2: Patient will ambulate with LRD 200ft independently with improved bilateral step length and symmetry. Long term goal 3: Patient will ascend/descend 12 steps using one HR with independently. (updated goal)  Long term goal 4: Duffy balance >/= 54/56 to demonstrate improved static balance. (updated goal)  Long term goal 5: DGI >/= 22/24 to demonstrate improved dynamic balance. (updated goal)  Progress toward goals:  Balance, coordination    POST-PAIN       Pain Rating (0-10 pain scale):  0 /10   Location and pain description same as pre-treatment unless indicated.    Action: [x] NA   [] Perform HEP  [] Meds as prescribed  [] Modalities as prescribed   [] Call Physician     Frequency/Duration:  Plan  Times per week: 2 x week for the next 2 weeks than 1x week for the next 4 weeks  Specific instructions for Next Treatment: total of 8 visits  Current Treatment Recommendations: Strengthening, Functional Mobility Training, Transfer Training, Endurance Training, Gait Training, Stair training, Neuromuscular Re-education, Manual Therapy - Soft Tissue Mobilization, Manual Therapy - Joint Manipulation, Home Exercise Program, Safety Education & Training, Patient/Caregiver Education & Training, Equipment Evaluation, Education, & procurement, Modalities  Plan Comment: 1x/wk through Dec     Pt to continue current HEP. See objective section for any therapeutic exercise changes, additions or modifications this date.          PT Individual Minutes  Time In: 1050  Time Out: 5765  Minutes: 64  Timed Code Treatment Minutes: 64 Minutes  Procedure Minutes:     Timed Activity Minutes Units   Neuro coral  64 4       Signature:  Electronically signed by Monika Cuenca on 12/15/20 at 12:14 PM EST

## 2020-12-17 ENCOUNTER — HOSPITAL ENCOUNTER (OUTPATIENT)
Dept: OCCUPATIONAL THERAPY | Age: 75
Setting detail: THERAPIES SERIES
Discharge: HOME | End: 2020-12-17
Payer: MEDICARE

## 2020-12-17 ENCOUNTER — HOSPITAL ENCOUNTER (OUTPATIENT)
Dept: SPEECH THERAPY | Age: 75
Setting detail: THERAPIES SERIES
Discharge: HOME | End: 2020-12-17
Payer: MEDICARE

## 2020-12-17 PROCEDURE — 92507 TX SP LANG VOICE COMM INDIV: CPT

## 2020-12-17 PROCEDURE — 97530 THERAPEUTIC ACTIVITIES: CPT

## 2020-12-17 NOTE — PROGRESS NOTES
Occupational Therapy  Daily Note     Name: Daquan Avila  : 1945  MRN: [id number]  Diagnosis:    L intracranial hemorrhage, R weakness, R neglect, R vision impairment    Visit Information:   Onset Date: 20  OT Insurance Information: Medicare  Total # of Visits Approved: 34  Total # of Visits to Date: 18  Progress Note Due Date: 21  Progress Note Counter:     Date: 2020  OT Therapeutic activities 55 minutes for 4 unit(s), CPT 81288     OT Individual Minutes  Time In: 1108  Time Out: 7492  Minutes: 54    Referring Practitioner: Dr. Renzo Coronado MD    Subjective:  Session started 8 min late. Pt in waiting room. OTR unaware and was antigen by ST. Pt seen by SLP prior to OT treatment. Pain rating:   Pre-treatment pain:    Pt denies pain    Action for pain:   No action necessary. Pain after treatment:      Pt denies pain         Focus of treatment was on the following:   decreasing pain, increase right hand active ROM and strengthening  right      Objective:    Treatment Activity:     MFR/Manual:   Compression to index finger and thumb after activity to decrease pain. Pt able to fully depress clip without report of pain. Pt used 2 lb resistive clip to  and stack 1/2\" cubes. Pt stacked into towers. Stacked 10 as highest. Pt with 1 verbal cue to scan as to not knock over cubes from tower. Pt then placed cubes individually from tower into container. 1-2/10 soreness in index and thumb reported after activity. Pt stated practicing reading clocks at home. Recommended to write down numbers and say them. Recommended using hidden pictures to assist with visual scanning and perception. Pt receptive to information. Discussed previous HEP: yes, Pt verbally confirmed compliant with HEP's    Comments: Pt stated stated to have shorter distances between visual trails. Assessment:   Pt tolerated treatment well.     Plan:   Continue POC    Goals:     Long term goals  Time Frame for Long term goals : 2 x/week for 12 weeks  Long term goal 1: Patient will be Supervised with all recommended HEP's, adaptive strategies, and adaptive techniques. Long term goal 2: Patient will increase R  strength from current by 10 lbs to increase performance with I/ADL's. Long term goal 3: Patient will increase R pinch strength from current by 5 lbs to increase performance with I/ADL's. Long term goal 4: Patient will increase dexterity in R hand as observed by 9 hole peg test by completing WFL to increase performance with I/ADL's. Long term goal 5: Patient will increase RUE coordination as observed by box and blocks by completing WFL to increase performance with I/ADL's. Long term goals 6: Patient will improve  RUE sensation and/or utilize compensatory techniques for safe completion of self-care as projected. Long term goal 7: Patient will scan environment to the right side without verbal cues to increase safety and performance with functional activities. Long term goal 8: Pt will complete clock drawing test for further assessment of visual perceptual skills. Long term goal 9: Pt will be IND using adaptive utensils to increase self-feeding skills.     MEHRDAD Cancino   12/17/2020  1:03 PM

## 2020-12-18 ENCOUNTER — HOSPITAL ENCOUNTER (OUTPATIENT)
Dept: OCCUPATIONAL THERAPY | Age: 75
Setting detail: THERAPIES SERIES
Discharge: HOME | End: 2020-12-18
Payer: MEDICARE

## 2020-12-18 ENCOUNTER — APPOINTMENT (OUTPATIENT)
Dept: PHYSICAL THERAPY | Age: 75
End: 2020-12-18
Payer: MEDICARE

## 2020-12-18 PROCEDURE — 97530 THERAPEUTIC ACTIVITIES: CPT

## 2020-12-21 ENCOUNTER — HOSPITAL ENCOUNTER (OUTPATIENT)
Dept: OCCUPATIONAL THERAPY | Age: 75
Setting detail: THERAPIES SERIES
Discharge: HOME | End: 2020-12-21
Payer: MEDICARE

## 2020-12-21 ENCOUNTER — APPOINTMENT (OUTPATIENT)
Dept: PHYSICAL THERAPY | Age: 75
End: 2020-12-21
Payer: MEDICARE

## 2020-12-21 PROCEDURE — 97530 THERAPEUTIC ACTIVITIES: CPT

## 2020-12-21 NOTE — PROGRESS NOTES
Occupational Therapy  Daily Note     Name: Brenna Cooper  : 1945  MRN: [id number]  Diagnosis:    L intracranial hemorrhage, R weakness, R neglect, R vision impairment    Visit Information:   Onset Date: 20  OT Insurance Information: Medicare  Total # of Visits Approved: 34  Total # of Visits to Date: 20  Progress Note Due Date: 21  Progress Note Counter:     Date: 2020  Time:   OT Therapeutic activities 55 minutes for 4 unit(s), CPT 54596       OT Individual Minutes  Time In: 1300  Time Out: 8279  Minutes: 54    Referring Practitioner: Dr. Wilmer Oppenheim, MD              Subjective: Patient reports that there is nothing new to report. Pain rating:   Pre-treatment pain:    Pt denies pain    Action for pain:   No action necessary. Pain after treatment:      Pt denies pain         Focus of treatment was on the following:   fine motor/dexterity, strengthening  bilateral   and visual/perceptual     Objective:    Treatment Activity:   Patient asked  at beginning of session. Patient able to state month and day correctly, but was off on the year by one year. Patient tried to give herself reminder clues as to what the year would be. Patient stated she wants to continued to be asked about her  at each session to continue to enhance her recall. Patient work on clock drawing and correctly putting a specific time on the clock. Patient able to correctly put in all numbers on two clocks with increased time. Patient had mod to max difficulty determining how to correctly place the time on the clock. Patient able to determine hour hand, but required verbal and visual cues for placing the minute hand on the two drawn clocks. Patient required increased time.  On the second clock, patient began to right \"20\" instead of drawing the minute hand to be 20 minutes past 8 o'clock, requiring the therapist to provided additional verbal and visual cues, reminding the patient to count by 5's for the minutes. When shown an analog clock with a time on it, patient able to determine the time with min difficulty. Patient ended session with fine motor coordination, pinch strengthening and visual scanning activity with small peg board to increase independence with ADL and IADL tasks. Patient replicated pattern from paper onto small peg board. Patient had min difficulty picking up the pegs on the right side and min to mod difficulty placing them into the peg board. Patient had mod difficulty keeping her place on the pattern and required min verbal cues to assist with place and next steps for the task. Patient then removed the pegs using 4# resistive clip. Patient had min difficulty removing the pegs. Patient tolerated treatment well. Discussed previous HEP: yes, Pt verbally confirmed compliant with HEP's    Comments:     Assessment:   Pt tolerated treatment well. Plan:   Continue POC    Goals:     Long term goals  Time Frame for Long term goals : 2 x/week for 12 weeks  Long term goal 1: Patient will be Supervised with all recommended HEP's, adaptive strategies, and adaptive techniques. Long term goal 2: Patient will increase R  strength from current by 10 lbs to increase performance with I/ADL's. Long term goal 3: Patient will increase R pinch strength from current by 5 lbs to increase performance with I/ADL's. Long term goal 4: Patient will increase dexterity in R hand as observed by 9 hole peg test by completing WFL to increase performance with I/ADL's. Long term goal 5: Patient will increase RUE coordination as observed by box and blocks by completing WFL to increase performance with I/ADL's. Long term goals 6: Patient will improve  RUE sensation and/or utilize compensatory techniques for safe completion of self-care as projected. Long term goal 7: Patient will scan environment to the right side without verbal cues to increase safety and performance with functional activities.   Long term goal 8: Pt will complete clock drawing test for further assessment of visual perceptual skills. Long term goal 9: Pt will be IND using adaptive utensils to increase self-feeding skills.     Signature: Electronically signed by Chapo Mcneil OT on 12/21/2020 at 3:49 PM

## 2020-12-22 ENCOUNTER — HOSPITAL ENCOUNTER (OUTPATIENT)
Dept: SPEECH THERAPY | Age: 75
Setting detail: THERAPIES SERIES
Discharge: HOME | End: 2020-12-22
Payer: MEDICARE

## 2020-12-22 ENCOUNTER — HOSPITAL ENCOUNTER (OUTPATIENT)
Dept: PHYSICAL THERAPY | Age: 75
Setting detail: THERAPIES SERIES
Discharge: HOME | End: 2020-12-22
Payer: MEDICARE

## 2020-12-22 PROCEDURE — 92507 TX SP LANG VOICE COMM INDIV: CPT

## 2020-12-22 PROCEDURE — 97116 GAIT TRAINING THERAPY: CPT

## 2020-12-22 PROCEDURE — 97112 NEUROMUSCULAR REEDUCATION: CPT

## 2020-12-22 NOTE — PROGRESS NOTES
[facility]  Speech Language Pathology  Adult Daily Note    Noe White  : 1945    Date: 2020    Visit Information:  SLP Insurance Information: Medicare  Total # of Visits Approved: 35  Total # of Visits to Date: 25  No Show: 0  Canceled Appointment: 1    Plan of care signed (Y/N):     Yes    Certification Period: 20-21  Plan of Care Visit # 22    Interventions used this date:  Receptive Language and Instruction in Compensatory Strategies    Subjective:  Patient was accompanied to therapy by her  this date. Pt's  had question regarding how to cue his wife during practice at home and how to help her differentiate between vowels. SLP answered questions appropriately. Pt's  reported that he is not sure how much longer they will be coming to therapy as he didn't think insurance would pay for much more. [employer]       Objective/Assessment:   Patient progressing towards goals:  1. To increase safety awareness, judgment and complex verbal expression, pt will complete abstract reasoning tasks (i.e. Word deduction, convergent and divergent naming, similarities/differences) with 80% accuracy and min cues. Abstract divergent namin% accuracy independently, increased to 100% accuracy with min verbal cues (increased time needed to complete this activity)  2. Pt will ID lowercase letters with 90% acc and standby cues to increase functional reading abilities  Goal previously met  3. Pt will read short functional phrases with 80% acc min verbal cues to increase functional reading and communication  Pt read questions of varying lengths then selected printed answer choices from a field of 3 if she did not know the answer to the question independently. Pt read questions with varying degrees of verbal cues. Pt often spelled out words orally to aid in comprehension of word.    4. Patient will read single functional words in lowercase with 80% acc mild verbal cues to

## 2020-12-22 NOTE — PROGRESS NOTES
Therapy                            Cancellation/No-show Note    Date: 2020  Patient Name: Noe White    : 1945  (8 y.o.)     MRN: [id number]    Account #: [de-identified]       Canceled Appointment: 1    Comments: For today's appointment patient:  [x]  Cancelled  []  Rescheduled appointment  []  No-show   []  Called pt to remind of next appointment     Reason given by patient:  []  Patient ill  [x]  Conflicting appointment  []  No transportation    []  Conflict with work  []  No reason given  []  Other:      [x] Pt has future appointments scheduled, no follow up needed  [] Pt requests to be on hold.     Reason:   If > 2 weeks please discuss with therapist.  [] Therapist to call pt for follow up     Signature: Electronically signed by MEHRDAD Powell on 20 at 3:30 PM EST

## 2020-12-22 NOTE — PROGRESS NOTES
Flexion: 5/5  R Knee Extension: 5/5  R Ankle Dorsiflexion: 5/5  Strength LLE  L Hip Flexion: 4+/5  L Hip Extension: 4-/5  L Hip ABduction: 4+/5  L Knee Flexion: 5/5  L Knee Extension: 5/5  L Ankle Dorsiflexion: 5/5    Assessment:   Activity Tolerance  Activity Tolerance: Patient Tolerated treatment well    Body structures, Functions, Activity limitations: Decreased functional mobility , Decreased strength, Decreased safe awareness, Decreased endurance, Decreased cognition, Decreased balance, Decreased coordination, Decreased posture  Assessment: Pt with steadily improving jay LE strength as well as static and dynamic standing balance. Pt continues to have difficulty with scanning environment with walking and prefers to ambulate with her hands behind her back to avoid bumping into things. Pt is able to complete dual tasking with minimal deviations but is challenged with cognitive tasks. Demonstrates mild instability at times with completing obstacle course but is able to self correct. Pt would benefit from continued skilled PT to further progress her balance and walking. Treatment Diagnosis: impaired balance, impaired gait, decreased LE strength  Prognosis: Good       Goals:  Short term goals  Time Frame for Short term goals: 4 weeks  Short term goal 1: Patient will be independent with bed mobility. Short term goal 2: Patient will be independent with transfers. Short term goal 3: Patient will be independent with HEP. Short term goal 4: Patient will perform dual tasking activity independently without LOB. (updated goal)  Short term goal 5: Patient will peform obstacle course with improved coordination of LEs independently. (updated goal)    Long term goals  Time Frame for Long term goals : 8 weeks  Long term goal 1: Patient will increase strength in bilateral LEs >/= 4+/5 for improved ambulation and transfers.   Long term goal 2: Patient will ambulate with LRD 200ft independently with improved bilateral step length and symmetry. Long term goal 3: Patient will ascend/descend 12 steps using one HR with independently. (updated goal)  Long term goal 4: Duffy balance >/= 54/56 to demonstrate improved static balance. (updated goal)  Long term goal 5: DGI >/= 22/24 to demonstrate improved dynamic balance. (updated goal)  Progress toward goals: see progress note    POST-PAIN       Pain Rating (0-10 pain scale):   0/10   Location and pain description same as pre-treatment unless indicated. Action: [x] NA   [] Perform HEP  [] Meds as prescribed  [] Modalities as prescribed   [] Call Physician     Frequency/Duration:  Plan  Times per week: 2 x week for the next 2 weeks than 1x week for the next 4 weeks  Specific instructions for Next Treatment: total of 8 visits  Current Treatment Recommendations: Strengthening, Functional Mobility Training, Transfer Training, Endurance Training, Gait Training, Stair training, Neuromuscular Re-education, Manual Therapy - Soft Tissue Mobilization, Manual Therapy - Joint Manipulation, Home Exercise Program, Safety Education & Training, Patient/Caregiver Education & Training, Equipment Evaluation, Education, & procurement, Modalities  Plan Comment: 1x/wk through Dec     Pt to continue current HEP. See objective section for any therapeutic exercise changes, additions or modifications this date.     PT Individual Minutes  Time In: 1100  Time Out: 8068  Minutes: 55  Timed Code Treatment Minutes: 54 Minutes  Procedure Minutes:0     Timed Activity Minutes Units   Neuro coral 40 3   Gait 14 1       Signature:  Electronically signed by Elsa Mello PT on 12/22/20 at 10:55 AM EST

## 2020-12-22 NOTE — PROGRESS NOTES
[address]     Ph: [phone]  Fax: [phone]    [] Certification  [] Recertification []  Plan of Care  [x] Progress Note [] Discharge      To: Edie Swann MD      From:  Heidy Hernandez PT  Patient: Kelsey Rajan     : 1945  Diagnosis: L intracranial hemorrhage, right weakness     Date: 2020  Treatment Diagnosis: impaired balance, impaired gait, decreased LE strength    Plan of Care/Certification Expiration Date: 21  Progress Report Period from:  2020  to 2020    Total # of Visits to Date: 21   No Show: 0    Canceled Appointment: 0     OBJECTIVE:   Short Term Goals - Time Frame for Short term goals: 4 weeks    Goals Current/Discharge status  Met   Short term goal 1: Patient will be independent with bed mobility. independent [x] yes  [] no   Short term goal 2: Patient will be independent with transfers. independent [x] yes  [] no   Short term goal 3: Patient will be independent with HEP. independent [x] yes  [] no   Short term goal 4: Patient will perform dual tasking activity independently without LOB. (updated goal)  Pt able to complete with mild occasional deviations especially with cognitive tasks [] yes  [x] no   Short term goal 5: Patient will peform obstacle course with improved coordination of LEs independently. (updated goal) Improved coordination with S/I [x]  yes  [x]  no     Long Term Goals - Time Frame for Long term goals : 8 weeks  Goals Current/ Discharge status Met   Long term goal 1: Patient will increase strength in bilateral LEs >/= 4+/5 for improved ambulation and transfers.  Strength RLE  R Hip Flexion: 4/5  R Hip Extension: 3+/5  R Hip ABduction: 4-/5  R Knee Flexion: 5/5  R Knee Extension: 5/5  R Ankle Dorsiflexion: 5/5  Strength LLE  L Hip Flexion: 4+/5  L Hip Extension: 4-/5  L Hip ABduction: 4+/5  L Knee Flexion: 5/5  L Knee Extension: 5/5  L Ankle Dorsiflexion: 5/5 [] yes  [x] no   Long term goal 2: Patient will ambulate with LRD 200ft independently with improved bilateral step length and symmetry. Ambulation 1  Surface: level tile  Device: No Device  Assistance: Independent  Quality of Gait: Decreased awareness at times of objects to the Rt, steady around track  Distance: 1/5 mile around track [x] yes  [] no   Long term goal 3: Patient will ascend/descend 12 steps using one HR with independently. (updated goal) Stairs  # Steps : 12  Stairs Height: 6\"  Rails: Right ascending  Device: No Device  Assistance: Supervision, Independent  Comment: recip [x] yes  [x] no   Long term goal 4: Duffy balance >/= 54/56 to demonstrate improved static balance. (updated goal) Duffy Balance Score: 52 [] yes  [x] no   Long term goal 5: DGI >/= 22/24 to demonstrate improved dynamic balance. (updated goal) Dynamic Gait Total Score: 20   [] yes  [x] no        Body structures, Functions, Activity limitations: Decreased functional mobility , Decreased strength, Decreased safe awareness, Decreased endurance, Decreased cognition, Decreased balance, Decreased coordination, Decreased posture  Assessment: Pt with steadily improving jay LE strength as well as static and dynamic standing balance. Pt continues to have difficulty with scanning environment with walking and prefers to ambulate with her hands behind her back to avoid bumping into things. Pt is able to complete dual tasking with minimal deviations but is challenged with cognitive tasks. Demonstrates mild instability at times with completing obstacle course but is able to self correct. Pt would benefit from continued skilled PT to further progress her balance and walking.   Specific instructions for Next Treatment: total of 8 visits  Prognosis: Good  Discharge Recommendations: Continue to assess pending progress      PLAN: [] Evaluate and Treat  Frequency/Duration:  Plan  Times per week: 2 x week for the next 2 weeks than 1x week for the next 4 weeks  Specific instructions for Next Treatment: total of 8 visits  Current Treatment Recommendations: Strengthening, Functional Mobility Training, Transfer Training, Endurance Training, Gait Training, Stair training, Neuromuscular Re-education, Manual Therapy - Soft Tissue Mobilization, Manual Therapy - Joint Manipulation, Home Exercise Program, Safety Education & Training, Patient/Caregiver Education & Training, Equipment Evaluation, Education, & procurement, Modalities  Plan Comment: 1x/wk through Dec     Precautions:     falls                       Patient Status:[x] Continue/ Initiate plan of Care    [] Discharge PT. Recommend pt continue with HEP. [] Additional visits requested, Please re-certify for additional visits:          Signature: Electronically signed by Abbie Caldera PT on 12/22/20 at 10:55 AM EST      If you have any questions or concerns, please don't hesitate to call. Thank you for your referral.    I have reviewed this plan of care and certify a need for medically necessary rehabilitation services.     Physician Signature:__________________________________________________________  Date:  Please sign and return

## 2020-12-22 NOTE — PROGRESS NOTES
Therapy                            Cancellation/No-show Note    Date:  2020  Patient Name:  Otilio Diaz  :  1945   MRN:  [id number]    Visit Information:  SLP Insurance Information: Medicare  Total # of Visits Approved: 35  Total # of Visits to Date:   No Show: 0  Canceled Appointment: 2    For today's appointment patient:  Cancelled    Reason given by patient:  Conflicting appointment    Follow-up needed:  Pt has future appointments scheduled, no follow up needed      Signature: Electronically signed by ARRON Hollins on 20 at 4:19 PM EST

## 2020-12-24 ENCOUNTER — HOSPITAL ENCOUNTER (OUTPATIENT)
Dept: OCCUPATIONAL THERAPY | Age: 75
Setting detail: THERAPIES SERIES
Discharge: HOME | End: 2020-12-24
Payer: MEDICARE

## 2020-12-24 ENCOUNTER — HOSPITAL ENCOUNTER (OUTPATIENT)
Dept: SPEECH THERAPY | Age: 75
Setting detail: THERAPIES SERIES
Discharge: HOME | End: 2020-12-24
Payer: MEDICARE

## 2020-12-28 ENCOUNTER — APPOINTMENT (OUTPATIENT)
Dept: PHYSICAL THERAPY | Age: 75
End: 2020-12-28
Payer: MEDICARE

## 2020-12-28 ENCOUNTER — HOSPITAL ENCOUNTER (OUTPATIENT)
Dept: PHYSICAL THERAPY | Age: 75
Setting detail: THERAPIES SERIES
Discharge: HOME | End: 2020-12-28
Payer: MEDICARE

## 2020-12-28 ENCOUNTER — HOSPITAL ENCOUNTER (OUTPATIENT)
Dept: OCCUPATIONAL THERAPY | Age: 75
Setting detail: THERAPIES SERIES
Discharge: HOME | End: 2020-12-28
Payer: MEDICARE

## 2020-12-28 PROCEDURE — 97530 THERAPEUTIC ACTIVITIES: CPT

## 2020-12-28 PROCEDURE — 97110 THERAPEUTIC EXERCISES: CPT

## 2020-12-28 PROCEDURE — 97112 NEUROMUSCULAR REEDUCATION: CPT

## 2020-12-28 NOTE — PROGRESS NOTES
[address] [address]  Outpatient Physical Therapy    Treatment Note        Date: 2020  Patient: Lenny Bauer  : 1945  ACCT #: [de-identified]  Referring Practitioner: oNe Washburn MD  Diagnosis: L intracranial hemorrhage, right weakness    Visit Information:  PT Visit Information  Onset Date: 20  PT Insurance Information: Medicare; [address]  Total # of Visits Approved: (BMN, [employer] 30 visits)  Total # of Visits to Date:   Plan of Care/Certification Expiration Date: 21  No Show: 0  Canceled Appointment: 0  Progress Note Counter:     Subjective: Pt states she is doing well. Daughter in waiting room during session. HEP Compliance:  [x] Good [] Fair [] Poor [] Reports not doing due to:    Vital Signs  Patient Currently in Pain: No   Pain Screening  Patient Currently in Pain: No    OBJECTIVE:   Exercises  Exercise 3: Standing with 1 UE support drawing alphabet with ball  Exercise 7: 6'' step ups F/L x10  Exercise 10: Gait drills: cross crawl marching, marching with opp UE raise, tandem x2 laps ea in // bars with intermittent fingertip support  Exercise 11: Foam: head turns, chop/lifts one food on/off, LOS  Exercise 13: Hand-eye coordination- balloon tapping on foam for coordination using Lt UE- outside AMY, with trunk rotation and crossing midline             *Indicates exercise, modality, or manual techniques to be initiated when appropriate    Assessment: Body structures, Functions, Activity limitations: Decreased functional mobility , Decreased strength, Decreased safe awareness, Decreased endurance, Decreased cognition, Decreased balance, Decreased coordination, Decreased posture  Assessment: Initiated scanning activities with focus on ambulation with finding words on the wall. Improving Rt side awareness during activity however decreased with functional activities such as during ambulation or obstacle negotiation.  Increased effort with lateral step ups leading with Rt vs Lt. Will anticipate D/C NV as pt is progressing well. Treatment Diagnosis: impaired balance, impaired gait, decreased LE strength  Prognosis: Good       Goals:  Short term goals  Time Frame for Short term goals: 4 weeks  Short term goal 1: Patient will be independent with bed mobility. Short term goal 2: Patient will be independent with transfers. Short term goal 3: Patient will be independent with HEP. Short term goal 4: Patient will perform dual tasking activity independently without LOB. (updated goal)  Short term goal 5: Patient will peform obstacle course with improved coordination of LEs independently. (updated goal)    Long term goals  Time Frame for Long term goals : 8 weeks  Long term goal 1: Patient will increase strength in bilateral LEs >/= 4+/5 for improved ambulation and transfers. Long term goal 2: Patient will ambulate with LRD 200ft independently with improved bilateral step length and symmetry. Long term goal 3: Patient will ascend/descend 12 steps using one HR with independently. (updated goal)  Long term goal 4: Duffy balance >/= 54/56 to demonstrate improved static balance. (updated goal)  Long term goal 5: DGI >/= 22/24 to demonstrate improved dynamic balance. (updated goal)  Progress toward goals: Balance, Rt side awareness    POST-PAIN       Pain Rating (0-10 pain scale):   0/10   Location and pain description same as pre-treatment unless indicated.    Action: [x] NA   [] Perform HEP  [] Meds as prescribed  [] Modalities as prescribed   [] Call Physician     Frequency/Duration:  Plan  Times per week: 2 x week for the next 2 weeks than 1x week for the next 4 weeks  Specific instructions for Next Treatment: total of 8 visits  Current Treatment Recommendations: Strengthening, Functional Mobility Training, Transfer Training, Endurance Training, Gait Training, Stair training, Neuromuscular Re-education, Manual Therapy - Soft Tissue Mobilization, Manual Therapy - Joint Manipulation, Home Exercise Program, Safety Education & Training, Patient/Caregiver Education & Training, Equipment Evaluation, Education, & procurement, Modalities  Plan Comment: 1x/wk through Dec     Pt to continue current HEP. See objective section for any therapeutic exercise changes, additions or modifications this date.          PT Individual Minutes  Time In: 1400  Time Out: 1500  Minutes: 60  Timed Code Treatment Minutes: 60 Minutes  Procedure Minutes:0     Timed Activity Minutes Units   Ther Ex 15 1   Neuro 45 3       Signature:  Electronically signed by Kaur Wang PTA on 12/28/20 at 10:19 AM EST

## 2020-12-28 NOTE — PROGRESS NOTES
Occupational Therapy  Daily Note     Name: Otilio Diaz  : 1945  MRN: [id number]  Diagnosis:    L intracranial hemorrhage, R weakness, R neglect, R vision impairment    Visit Information:   Onset Date: 20  OT Insurance Information: Medicare  Total # of Visits Approved: 34  Total # of Visits to Date: 1  Progress Note Due Date: 21  Canceled Appointment: 1  Progress Note Counter:     Date: 2020  Time:   OT Therapeutic activities 55 minutes for 4 unit(s), CPT 00043       OT Individual Minutes  Time In: 1300  Time Out: 7981  Minutes: 54    Referring Practitioner: Dr. Umm Meraz MD              Subjective:  Patient states that she has nothing new to report. Pain rating:   Pre-treatment pain:    Pt denies pain    Action for pain:   No action necessary. Pain after treatment:      Pt denies pain         Focus of treatment was on the following:   fine motor/dexterity, and visual/perceptual     Objective:    Treatment Activity:   Patient reported difficulty with reading analog clock times. Patient engaged in activity to increase ability to scan for clock and determine the correct time. Patient completed this for 6 time displays. Patient required increased time to complete. Patient required min to mod verbal cues for scanning and correcting mistakes. Patient engaged in adjusted boggle activity. Boggle tiles were spread across the table for visual perceptual and increased scanning. Patient created 3-letter words with the tiles. Patient had mod difficulty finding words and had to squint to \"help with double vision\" (per patient). Patient had difficulty reading the correct letters on the tiles, thinking that she had made words, but the letter were out of order or the wrong word. Patient required mod assistance to correct. Patient engaged in visual/perceptual and scanning activity.  Patient used right hand to  bean bags of different colors and then had to scan the room for two different targets. Patient had to match the bean bag color to the correct target, completing for 24 bean bags for 2 sets. Patient had min to mod difficulty with this task, requiring increased time to complete. Patient then had to listen to instruction from the therapist for which target to throw the bean bag to, with therapist stating \"right\" or \"left\". Patient completed this with 24 bean bags and had 4 mistakes of throwing to the wrong direction. Patient required min verbal cues to recognized mistakes. Patient ended session by engaging in Spot It game for visual perceptual and scanning activity. Therapist placed the cards further apart, with one on far right side and one on far left side.  Pt with fair pace to point or verbalize matches on cards.  Patient required some increased time due to increased scanning from side to side to determine match on card. Discussed previous HEP: yes, Pt verbally confirmed compliant with HEP's. Patient's daughter was in waiting area during patient's PT session. OT went and discussed with patient's daughter ideas for additional activities to do at home, as well as what patient has been completing during therapy. Comments:     Assessment:   Pt tolerated treatment well. Plan:   Continue POC    Goals:     Long term goals  Time Frame for Long term goals : 2 x/week for 12 weeks  Long term goal 1: Patient will be Supervised with all recommended HEP's, adaptive strategies, and adaptive techniques. Long term goal 2: Patient will increase R  strength from current by 10 lbs to increase performance with I/ADL's. Long term goal 3: Patient will increase R pinch strength from current by 5 lbs to increase performance with I/ADL's. Long term goal 4: Patient will increase dexterity in R hand as observed by 9 hole peg test by completing WFL to increase performance with I/ADL's.   Long term goal 5: Patient will increase RUE coordination as observed by box and blocks by completing WFL to increase performance with I/ADL's. Long term goals 6: Patient will improve  RUE sensation and/or utilize compensatory techniques for safe completion of self-care as projected. Long term goal 7: Patient will scan environment to the right side without verbal cues to increase safety and performance with functional activities. Long term goal 8: Pt will complete clock drawing test for further assessment of visual perceptual skills. Long term goal 9: Pt will be IND using adaptive utensils to increase self-feeding skills.     Signature: Electronically signed by Nemo Mcdaniel OT on 12/28/2020 at 4:44 PM

## 2020-12-29 ENCOUNTER — HOSPITAL ENCOUNTER (OUTPATIENT)
Dept: SPEECH THERAPY | Age: 75
Setting detail: THERAPIES SERIES
Discharge: HOME | End: 2020-12-29
Payer: MEDICARE

## 2020-12-29 ENCOUNTER — APPOINTMENT (OUTPATIENT)
Dept: PHYSICAL THERAPY | Age: 75
End: 2020-12-29
Payer: MEDICARE

## 2020-12-29 NOTE — PROGRESS NOTES
Therapy                            Cancellation/No-show Note      Date:  2020  Patient Name:  Carolyn Henson  :  1945   MRN:  [id number]          Visit Information:  SLP Insurance Information: Medicare  Total # of Visits Approved: 35  Total # of Visits to Date:   No Show: 0  Canceled Appointment: 2    For today's appointment patient:  Cancelled    Reason given by patient:  Other:    Follow-up needed:  Pt has future appointments scheduled, no follow up needed    Comments:   Therapist out of office- cx does not count against pt.      Signature: Electronically signed by ARRON Hines on 20 at 8:55 AM EST

## 2020-12-31 ENCOUNTER — HOSPITAL ENCOUNTER (OUTPATIENT)
Dept: SPEECH THERAPY | Age: 75
Setting detail: THERAPIES SERIES
Discharge: HOME | End: 2020-12-31
Payer: MEDICARE

## 2020-12-31 ENCOUNTER — HOSPITAL ENCOUNTER (OUTPATIENT)
Dept: OCCUPATIONAL THERAPY | Age: 75
Setting detail: THERAPIES SERIES
Discharge: HOME | End: 2020-12-31
Payer: MEDICARE

## 2020-12-31 PROCEDURE — 97112 NEUROMUSCULAR REEDUCATION: CPT

## 2020-12-31 PROCEDURE — 97530 THERAPEUTIC ACTIVITIES: CPT

## 2020-12-31 PROCEDURE — 92507 TX SP LANG VOICE COMM INDIV: CPT

## 2020-12-31 NOTE — PROGRESS NOTES
[facility]  Speech Language Pathology  Adult Daily Note    Malika Cole  : 1945    Date: 2020    Visit Information:       SLP Insurance Information: Medicare  Total # of Visits Approved: 35  Total # of Visits to Date: 23  No Show: 0  Canceled Appointment: 2               Plan of care signed (Y/N):     Yes    Certification Period: 20-21  Plan of Care Visit # 23      Interventions used this date:  Receptive Language and Instruction in Compensatory Strategies    Subjective:  Spoke with patient and  about scheduling for the new year. Said  Jennifer Capone will be giving them a call on Monday with a schedule. Alert and Cooperative       Objective/Assessment:   Patient progressing towards goals:  1. To increase safety awareness, judgment and complex verbal expression, pt will complete abstract reasoning tasks (i.e. Word deduction, convergent and divergent naming, similarities/differences) with 80% accuracy and min cues. Not addressed   2. Pt will ID lowercase letters with 90% acc and standby cues to increase functional reading abilities  Goal met previously    3. Pt will read short functional phrases with 80% acc min verbal cues to increase functional reading and communication  Pt read sentences and completed the sentence with a choice of four words with 60%acc I and increased to 100% acc with min-mod cues for sound cues   4. Patient will read single functional words in lowercase with 80% acc mild verbal cues to increase functional reading abilities    5. Patient will complete CART sets with 80% acc min assist to increase functional writing and spelling for communication    Copy and Recall Treatment (CART) was targeted this date using Set 4. Patient achieved 100% naming target items with standby cues, 80% acc I writing target items, 100% copying target items and 100% recalling target items. Mastery of set is reached with 80% acc of target items.  Set 4 is

## 2020-12-31 NOTE — PROGRESS NOTES
Occupational Therapy  Daily Note     Name: Viridiana Shea  : 1945  MRN: [id number]       Visit Information:   Progress Note Counter:     Date: 2020  Time:   OT Neuromuscular 30 minutes for 2 unit(s), CPT 59367  OT Therapeutic activities 30 minutes for 2 unit(s), CPT 82985       Subjective:  Pt here ,  dropped her off . Pt required extra time to get to tx space due to hemianopsia       Pain rating:   Pre-treatment pain:    0/10    Action for pain:   No action necessary. Pain after treatment:      0/10         Focus of treatment was on the following:   endurance, Exercise: Pt completed table top exercises for increased AROM and neuromuscular re-education     Objective:    Treatment Activity: AROM PROM of L UE in all planes in sitting    Modality:     vibration of the L UE to provide relaxation and sensory organization to improve motor planning            Exercises:  Pt completed table top exercises for sensory integration and motorplanning activities due to visual deficits                Comments:  Pt was very cooperative and engaged in session    Assessment:   Pt reported decreased  pain after OT treatment. Pt making good  progress towards goals. Plan:   Continue POC    Goals:  As per POC          MEHRDAD Gleason   2020  11:13 AM Electronically signed by MEHRDAD Gleason on 2020 at 3:52 PM

## 2021-01-07 ENCOUNTER — HOSPITAL ENCOUNTER (OUTPATIENT)
Dept: OCCUPATIONAL THERAPY | Age: 76
Setting detail: THERAPIES SERIES
Discharge: HOME | End: 2021-01-07
Payer: MEDICARE

## 2021-01-07 ENCOUNTER — HOSPITAL ENCOUNTER (OUTPATIENT)
Dept: SPEECH THERAPY | Age: 76
Setting detail: THERAPIES SERIES
Discharge: HOME | End: 2021-01-07
Payer: MEDICARE

## 2021-01-07 PROCEDURE — 92507 TX SP LANG VOICE COMM INDIV: CPT

## 2021-01-07 PROCEDURE — 97530 THERAPEUTIC ACTIVITIES: CPT

## 2021-01-07 NOTE — PROGRESS NOTES
OCCUPATIONAL THERAPY PROGRESS NOTE  [x]  [address] [facility] [address]        Ph: [phone]         Fax: [phone]          []  [facility]         [address] [place] [address]         Ph: [phone]         Fax: [phone]        [] Certification     [] Recertification     [] Plan of Care    [x] Progress Note        Date: 2021    To:Referring Practitioner: Dr. Shaq White MD          From: Abad Hager OT  Patient: Arun Loaiza       : 1945  MRN: [id number]  Diagnosis:Diagnosis:    L intracranial hemorrhage, R weakness, R neglect, R vision impairment   Date of eval: 9/10/2020    Visit Information:   Onset Date: 20  OT Insurance Information: Medicare  Total # of Visits Approved: 34(35 visits OT per year, 34 in  due to one used)  Total # of Visits to Date: 23(23 for POC, 1 in )  Progress Note Due Date: 21  Canceled Appointment: 1  Progress Note Counter: 10/12    Last POC date: 2020   Reporting period: 12/10/2020 to 2021                            Assessment:    Goals Current/Discharge status  Met   Long term goal 1: Patient will be Supervised with all recommended HEP's, adaptive strategies, and adaptive techniques. Ongoing. [] Met  [] Partially Met  [] Not Met   Long term goal 2: Patient will increase R  strength from current by 10 lbs to increase performance with I/ADL's. Eval: 18.3  Previous: 25  Current: 33 [x] Met  [] Partially Met  [] Not Met   Long term goal 3: Patient will increase R pinch strength from current by 5 lbs to increase performance with I/ADL's. Palmar Pinch  Eval: 6.57  Previous: 6.67  Current: 9  Lateral Pinch  Eval: NT  Previous: 10  Current: 11 [] Met  [x] Partially Met  [] Not Met   Long term goal 4: Patient will increase dexterity in R hand as observed by 9 hole peg test by completing WFL to increase performance with I/ADL's. simplification, joint protection, body mechanics   [x] Pt able to work with [employer]  [x] D/C plan: Will assess pt after established visits to determine need for continued therapy. [x] Neuromuscular Re-education  [] Tissue (stress) Loading Program  [x] PROM/Stretching/AAROM/AROM  [] Splinting  [] Desensitization  [x] Strengthening/Graded Therapeutic Activity  [x] Coordination/Dexterity Training  [x] Manual Techniques  [x] Instruction in HEP  [x] Modalities: [] Ultrasound [] Infrared                 [] Hot/cold pack [] Paraffin                  [] Electrical stimulation                  [] Other:                                 Frequency/Duration: 2x/week for 12 weeks      Rehab Potential: [] Excellent [x] Good     [] Fair [] Poor      Patient Status: [x] Continue/Initate plan of Care   []  Discharge   []  Additional visits requested, please re-certify for additional visits        Electronically signed by:  Laurel Cherry OT 1/7/2021 12:32 PM    If you have any questions or concerns, please don't hesitate to call. Thank you for your referral.      I have reviewed this plan of care and certify a need for medically necessary rehabilitation services.     Physician Signature:__________________________________________________________    Date: 1/7/2021  Please sign and return

## 2021-01-07 NOTE — PROGRESS NOTES
Occupational Therapy  Daily Note     Name: Ladonna Sifuentes  : 1945  MRN: [id number]  Diagnosis:    L intracranial hemorrhage, R weakness, R neglect, R vision impairment    Visit Information:   Onset Date: 20  OT Insurance Information: Medicare  Total # of Visits Approved: 34(35 visits OT per year, 34 in  due to one used)  Total # of Visits to Date: 23(23 for POC, 1 in )  Progress Note Due Date: 21  Canceled Appointment: 1  Progress Note Counter: 10/12    Date: 2021  Time:   OT Therapeutic activities 55 minutes for 4 unit(s), CPT 07779       OT Individual Minutes  Time In: 1400  Time Out: 3551  Minutes: 54    Referring Practitioner: Dr. Ana Barr MD              Subjective: Patient reports that her  is encouraging her to workout her arms while she watches tv. Patient reports she feels like her right arm is continuing to improve. Patient reports her vision is the biggest barrier. Pain rating:   Pre-treatment pain:    Pt denies pain    Action for pain:   No action necessary. Pain after treatment:      Pt denies pain         Focus of treatment was on the following:   assess for progress toward goals, fine motor/dexterity and visual/perceptual     Objective:    Treatment Activity:   Objective measurements taken this date to assess progress towards goals and for 30-day progress note.      Pt able to draw clock with correct hour markers. Pt with difficulty understanding when asked to make time at 8:15. Patient able to identify where clock hand went for the hour (8), but required mod verbal cues for determine the minute hand. Patient continually wanted to put the minute hand on the other side of the clock, closer to :45 minutes past the hour.  This is improvement from previous progress note where patient tried to right the time to outside of the clock in numbers instead of putting the minute and hour hands in the clock.         & Pinch Strength  Average of  Three tries Right          Current Right   Eval Right              Norm   Left  Current Left   Eval     Left     Norm       Pranav lb. 33 18.3 Female age 76-69: 55 lbs  43.67 39 Female age 76-69: 41 lbs    PALMAR  Pinch  Lb. 9 6.66 Female age 76-69: 9.5 lbs  5 8 Female age 76-69: 8.5 lbs    LATERAL  Pinch  Lb. 10.17 NT on eval  (7 on 12/10) Female age 76-69: 11.0 lbs  11 NT on eval    (10 on 12/10) Female age 76-69: 10.0 lbs    Comments:      Coordination & Dexterity    Right   eval Right  12/10/20 Right  1/7/21 Norm Left Eval 12/10 Norm   Nine Hole Peg Test  (seconds) 44.3 36.9 35.16 Female age 76-69: 22.2 s  NT 32.1 Female age 76-69: 22.0 s    Box & Blocks  (# of blocks) 33 12 28 Female age 76-69: 74.5 NT 45 Female age 76-69: 72.4   Comments: L hand missed 3 from box on 12/10,  Pt reported R hand \"felt twisted\" during box and blocks on 12/10 and kept tossing blocks into divider (not high enough). Pt not using visual compensatory techniques to gauge height of divider during testing of R side.           Comments: Pt stated occasional numbness and tingling in R hand. Pt stated usually occurs in bed. Patient engaged in visual scanning activity to improve scanning towards the right side. Patient had to find 16 images in a hidden pictures book worksheet. Patient found 14/16 without assistance, and found the last two images with min verbal cues. Patient did require increased time to complete. Patient required min verbal cues for scanning. Patient able to make own adaptation, such as turning the paper for a different view, with no cues. Patient had difficulty marking the objects in the correct spot on paper with highlighter due to double vision, but able to self correct and use left hand to assist right hand to correct location. Discussed previous HEP: yes, Pt verbally confirmed compliant with HEP's    Comments:     Assessment:   Pt tolerated treatment well. Patient demonstrated improvements in  and pinch strength. Patient displayed improvement in 9HPT. Patient continued to improve with clock drawing, but continues with difficulty in determine where the hands go on the clock for a specific time. Patient would benefit from continued OT services to continue to make progress towards goals. Plan:   Continue POC. Next visit 1/12/2021    Goals:     Long term goals  Time Frame for Long term goals : 2 x/week for 12 weeks  Long term goal 1: Patient will be Supervised with all recommended HEP's, adaptive strategies, and adaptive techniques. Long term goal 2: Patient will increase R  strength from current by 10 lbs to increase performance with I/ADL's. Long term goal 3: Patient will increase R pinch strength from current by 5 lbs to increase performance with I/ADL's. Long term goal 4: Patient will increase dexterity in R hand as observed by 9 hole peg test by completing WFL to increase performance with I/ADL's. Long term goal 5: Patient will increase RUE coordination as observed by box and blocks by completing WFL to increase performance with I/ADL's. Long term goals 6: Patient will improve  RUE sensation and/or utilize compensatory techniques for safe completion of self-care as projected. Long term goal 7: Patient will scan environment to the right side without verbal cues to increase safety and performance with functional activities. Long term goal 8: Pt will complete clock drawing test for further assessment of visual perceptual skills. Long term goal 9: Pt will be IND using adaptive utensils to increase self-feeding skills.     Signature: Electronically signed by Sun Yuen OT on 1/7/2021 at 3:53 PM

## 2021-01-07 NOTE — PROGRESS NOTES
[x][facility]    [][facility] [address] [place] [facility]     [address] [place], [address] [place], [address]      Phone: [phone]     Phone: [phone]      Fax: [phone]     Fax: [phone]    ______________________________________________________________________                [facility]  Speech Language Pathology  Adult Progress Note                          Physician: KEVIN Rizvi  From: Tita Simms MA,CCC-SLP   Patient: Collin Robles       : 1945  Diagnosis: Aphasia    Date: 2021  Treatment Diagnosis: Aphasia R47.1  Date of Evaluation: 9/10/20      Plan of Care/Treatment to date: Expressive Language Therapy, Receptive Language Therapy and Instruction in Compensatory Strategies    Date range from 12/3/20 to 21. Subjective: Pt attended all scheduled therapy sessions and was motivated and participated in all exercises given. Pt voices frustration with reading and writing/spelling deficits. Progress toward Short-Term Goals:  1. To increase safety awareness, judgment and complex verbal expression, pt will complete abstract reasoning tasks (i.e. Word deduction, convergent and divergent naming, similarities/differences) with 80% accuracy and min cues. Goal met for 80% acc increase to 90% acc Ind  2. Pt will ID lowercase letters with 90% acc and standby cues to increase functional reading abilities  Goal met   3. Pt will read short functional phrases with 80% acc min verbal cues to increase functional reading and communication  Adjust goal for Multiple Oral Re-Reading program initiated   4. Patient will read single functional words in lowercase with 80% acc mild verbal cues to increase functional reading abilities   Goal met  5.  Patient will complete CART sets with 80% acc min assist to increase functional writing and spelling for communication  Goal met for all CART sets    Updated Short-Term Goals:  1. To increase safety awareness, judgment and complex verbal expression, pt will complete abstract reasoning tasks (i.e. Word deduction, convergent and divergent naming, similarities/differences) with 90% accuracy Ind.  2. Pt will complete sound-letter correspondence with phonological treatment program (PTP) with 90% acc Ind  3. Pt will complete letter-sound correspondence with phonological treatment program (PTP) with 90% acc Ind  4. Pt will increase words per minute (WPM) during Multiple Oral Re-reading (MOR) from 7.2 WPM to 10 WPM with standby cues to increase patient functional reading  5. Pt will increase word reading accuracy during Multiple Oral Re-reading (MOR) to 80% acc with standby cues to increase patients functional reading     Long-Term Goals:   1. Pt will improve her expressive language abilities to a sentence/conversation level for effective communication with familiar and unfamiliar communication partners so they may functionally communicate and express complex ideas. 2. Pt will improve her reading comprehension abilities to a phrase level for effective comprehension of functional materials. Frequency/Duration of Treatment:   Days: 2 days/week  Length of Session:  45 minutes and 60 minutes  Weeks: 8 Weeks and 10 Weeks    Rehab Potential: Good    Prognostic Factors: Motivation  Family/community support  Participation level       Goal Status: Partially Achieved      Patient Status: Continue per initial Plan of Care    Discharge Plan: home          This patients condition is expected to improve within the treatment timeframe.     MODIFIED WELCH FALL RISK ASSESSMENT:    History of Falling (has patient fallen in the past 30 days?):    No (0 points)    Secondary Diagnosis (is there more than 1 medical diagnosis in patients medical history?):    Yes (15 points)    Ambulatory Aid:    No device is used (0 points)    Gait: Normal/bedrest/wheelchair (0 points)    Mental Status:    Oriented to own ability (0 points)      Total points = 15    Fall Risk Level: low    0 - 24: Low Risk - implement low risk fall prevention interventions    25 - 44: Medium risk  45 and higher: High Risk    JASMYN NOMS: N/A      Electronically signed by:  Electronically signed by ARRON Thao on 1/7/2021 at 2:38 PM       If you have any questions or concerns, please don't hesitate to call.   Thank you for your referral.      Physician Signature:________________________________Date:__________________  By signing above, therapists plan is approved by physician

## 2021-01-07 NOTE — PROGRESS NOTES
sound correspondence (Pt required moderate explanation of task and reasoning)  Set 1: 33% acc Ind and 100% acc mod cues  Set 2: 100% acc Ind when given key word  Set 3: 100% acc Ind when given key word  Set 4: 100% acc Ind when given key word        Pain Assessment:  Initial Assessment:  Patient denies pain. Re-assessment:  Patient denies pain. Plan:  Continue with current goals    Patient/Caregiver Education:  Patient/Caregiver educated on session. Patient/Caregiver provided with home program: SLP provided patient with name of [facility] to practice MOR passage.      Time in: 1300  Time out:1400  Minutes seen: 61      Signature: Electronically signed by ARRON Dempsey on 1/7/2021 at 2:24 PM

## 2021-01-12 ENCOUNTER — HOSPITAL ENCOUNTER (OUTPATIENT)
Dept: SPEECH THERAPY | Age: 76
Setting detail: THERAPIES SERIES
Discharge: HOME | End: 2021-01-12
Payer: MEDICARE

## 2021-01-12 ENCOUNTER — HOSPITAL ENCOUNTER (OUTPATIENT)
Dept: PHYSICAL THERAPY | Age: 76
Setting detail: THERAPIES SERIES
Discharge: HOME | End: 2021-01-12
Payer: MEDICARE

## 2021-01-12 ENCOUNTER — HOSPITAL ENCOUNTER (OUTPATIENT)
Dept: OCCUPATIONAL THERAPY | Age: 76
Setting detail: THERAPIES SERIES
Discharge: HOME | End: 2021-01-12
Payer: MEDICARE

## 2021-01-12 PROCEDURE — 97112 NEUROMUSCULAR REEDUCATION: CPT

## 2021-01-12 PROCEDURE — 97116 GAIT TRAINING THERAPY: CPT

## 2021-01-12 PROCEDURE — 92507 TX SP LANG VOICE COMM INDIV: CPT

## 2021-01-12 PROCEDURE — 97530 THERAPEUTIC ACTIVITIES: CPT

## 2021-01-12 NOTE — PROGRESS NOTES
[address] [address]  Outpatient Physical Therapy    Treatment Note        Date: 2021  Patient: Norma Zhu  : 1945  ACCT #: [de-identified]  Referring Practitioner: Haydee Huitron MD  Diagnosis: L intracranial hemorrhage, right weakness    Visit Information:  PT Visit Information  Onset Date: 20  PT Insurance Information: Medicare; [id number] DEXMA  Total # of Visits Approved: ([email or web address] [place], [place] [email or web address] [facility] visits)  Total # of Visits to Date:   Plan of Care/Certification Expiration Date: 21  No Show: 0  Progress Note Due Date: 21  Canceled Appointment: 0  Progress Note Counter:     Subjective: Pt reports seeing neuro-ophthalmologist who said there was nothing he could do for her. Feels as if vision is her biggest challenge vs balance. HEP Compliance:  [x] Good [x] Fair [] Poor [] Reports not doing due to:    Vital Signs  Patient Currently in Pain: No   Pain Screening  Patient Currently in Pain: No    OBJECTIVE:   Exercises  Exercise 1: Dual tasking: amb with bean bag toss to self, amb with shoulder flex/ext with boom whackers with and without category naming  Exercise 2: Obstacle course: amb over 6\" hurdles and 3\" box, on/off foam, around bolsters, up/down 4\" box    Balance  Comments: Duffy = 54/56, DGI =     Bed mobility  Rolling to Left: Independent  Rolling to Right: Independent  Supine to Sit: Independent  Sit to Supine: Independent    Ambulation 1  Surface: level tile  Device: No Device  Assistance: Independent  Quality of Gait: Decreased awareness at times of objects to the Rt, steady around track  Distance: 1/5 mile around track    Stairs  # Steps : 12  Stairs Height: 6\"  Rails: Right ascending  Device: No Device  Assistance: Independent  Comment: recip    Transfers  Sit to Stand: Independent  Stand to sit:  Independent    Strength: [] NT  [x] MMT completed:  Strength RLE  R Hip Flexion: 4/5  R Hip Extension: 3+/5  R Hip ABduction: 4-/5  R Knee Flexion: 5/5  R Knee Extension: 5/5  R Ankle Dorsiflexion: 5/5  Strength LLE  L Hip Flexion: 4+/5  L Hip Extension: 4/5  L Hip ABduction: 4+/5  L Knee Flexion: 5/5  L Knee Extension: 5/5  L Ankle Dorsiflexion: 5/5     Assessment:   Activity Tolerance  Activity Tolerance: Patient Tolerated treatment well    Body structures, Functions, Activity limitations: Decreased functional mobility , Decreased strength, Decreased safe awareness, Decreased endurance, Decreased cognition, Decreased balance, Decreased coordination, Decreased posture  Assessment: P with no significant change in strength in jay LEs with MMT. Pt with some improvement in [place] and DGI testing. Pt with good balacne during DGI but must slow down to complete some tasks due to vision vs balance. Pt is able to ambulate and maintain balance with looking around but requires reminders to look to Rt. Pt is dong well overall and will be D/C'd to HEP at this time. Treatment Diagnosis: impaired balance, impaired gait, decreased LE strength  Prognosis: Good       Goals:  Short term goals  Time Frame for Short term goals: 4 weeks  Short term goal 1: Patient will be independent with bed mobility. Short term goal 2: Patient will be independent with transfers. Short term goal 3: Patient will be independent with HEP. Short term goal 4: Patient will perform dual tasking activity independently without LOB. (updated goal)  Short term goal 5: Patient will peform obstacle course with improved coordination of LEs independently. (updated goal)    Long term goals  Time Frame for Long term goals : 8 weeks  Long term goal 1: Patient will increase strength in bilateral LEs >/= 4+/5 for improved ambulation and transfers. Long term goal 2: Patient will ambulate with LRD 200ft independently with improved bilateral step length and symmetry. Long term goal 3: Patient will ascend/descend 12 steps using one HR with independently. (updated goal)  Long term goal 4: Duffy balance >/= 54/56 to demonstrate improved static balance. (updated goal)  Long term goal 5: DGI >/= 22/24 to demonstrate improved dynamic balance. (updated goal)  Progress toward goals: see D/C note    POST-PAIN       Pain Rating (0-10 pain scale):  0 /10   Location and pain description same as pre-treatment unless indicated. Action: [x] NA   [] Perform HEP  [] Meds as prescribed  [] Modalities as prescribed   [] Call Physician     Frequency/Duration:  Plan  Plan Comment: D/C to HEP     Pt to continue current HEP. See objective section for any therapeutic exercise changes, additions or modifications this date.     PT Individual Minutes  Time In: 1108  Time Out: 9594  Minutes: 47  Timed Code Treatment Minutes: 47 Minutes  Procedure Minutes:0     Timed Activity Minutes Units   Neuro coral 37 2   Gait 10 1       Signature:  Electronically signed by Jesica Patel PT on 1/12/21 at 9:06 AM EST

## 2021-01-12 NOTE — PROGRESS NOTES
and baseline, after 1 model of clinician reading aloud, pt read 9.6 WPM with min-mod verbal cues. 5. Pt will increase word reading accuracy during Multiple Oral Re-reading (MOR) to 80% acc with standby cues to increase patients functional reading   With Passage 1 pt had 82% acc Ind and increased to 91% acc min cues  With Passage 2 after 1x clinician model reading aloud pt had 82% acc Ind  Clinician encouraging pt to try to self cue with use of phonemic cueing and not use the \"touch to hear word\" function          Pain Assessment:  Initial Assessment:  Patient denies pain. Re-assessment:  Patient denies pain. Plan:  Continue with current goals    Patient/Caregiver Education:  Patient/Caregiver educated on session. Patient/Caregiver provided with home program: SLP provided patient with name of [place] kyle to practice MOR passage. SLP provided new passage to practice \"Flat\" and instructed patient and  to practice either 3-5x/day or 30 minutes 2x/day.  Both verbalized understanding    Time in: 1000  Time out: [address]  Minutes seen: 45     Signature: Electronically signed by ARRON Aleman on 1/12/2021 at 11:18 AM

## 2021-01-12 NOTE — PROGRESS NOTES
Occupational Therapy  Daily Note     Name: Nando Bansal  : 1945  MRN: [id number]  Diagnosis:    L intracranial hemorrhage, R weakness, R neglect, R vision impairment    Visit Information:   Onset Date: 20  OT Insurance Information: Medicare  Total # of Visits Approved: 34(35 visits OT per year, 34 in  due to one used)  Total # of Visits to Date: 24  Total: 2 for current years (insurance count)   Progress Note Due Date: 21  Canceled Appointment: 1  Progress Note Counter:     Date: 2021  OT Therapeutic activities 58 minutes for 4 unit(s), CPT 83095       OT Individual Minutes  Time In: 0900  Time Out: 8019  Minutes: 62    Referring Practitioner: Dr. Emily Pace MD              Subjective:         Pain rating:   Pre-treatment pain:    Pt denies pain    Action for pain:   No action necessary. Pain after treatment:      Pt denies pain         Focus of treatment was on the following:   education/training, fine motor/dexterity, visual motor and visual/perceptual     Objective:    Treatment Activity:     Discussed previous HEP: yes, Pt verbally confirmed compliant with HEP's    Pt reported hitting head on cabinets at home. Recommend placing corner cushions on cabinets to decrease impact of hitting head. Provided written handout to better shop at home. Highlighted the name of item (corner cushions). Pt stated using \"Beep, beep, beep\" when placing items around mailbox to avoid walking into wall. Recommended using similar technique in kitchen (on top of compensatory strategies to scan). Pt stated difficulty with dialing phone numbers. Recommended using calculator on cell phone or using unplugged land line to practice. Pt then dominguez clock x 4 trials. 1st trial pt able to make [place] and place 12,3,6,6 in appropriate place. Pt moved counter clockwise from 12 to place other hours markers. Pt confused self but able to notice error. Pt attempted 2nd clock with similar difficulties.  On  trial, pt able to place all markers inside the [place] with good pace. Pt stated has not practiced at home for about 2 weeks. Session ended with FM activity placing 31 small beads into plastic elbow. 1 trial not times, 2nd trial pt completed in 53 seconds. Assessment:   Pt tolerated treatment well. Pt receptive to recommendations on this date. Pt has one more visit for POC. Discussed with  (IS) about reset for new year or Max visits. IS to look into. Pt measured for progress on last session has made progress. Plan:   Continue POC    Goals:     Long term goals  Time Frame for Long term goals : 2 x/week for 12 weeks  Long term goal 1: Patient will be Supervised with all recommended HEP's, adaptive strategies, and adaptive techniques. Long term goal 2: Patient will increase R  strength from current by 10 lbs to increase performance with I/ADL's. Long term goal 3: Patient will increase R pinch strength from current by 5 lbs to increase performance with I/ADL's. Long term goal 4: Patient will increase dexterity in R hand as observed by 9 hole peg test by completing WFL to increase performance with I/ADL's. Long term goal 5: Patient will increase RUE coordination as observed by box and blocks by completing WFL to increase performance with I/ADL's. Long term goals 6: Patient will improve  RUE sensation and/or utilize compensatory techniques for safe completion of self-care as projected. Long term goal 7: Patient will scan environment to the right side without verbal cues to increase safety and performance with functional activities. Long term goal 8: Pt will complete clock drawing test for further assessment of visual perceptual skills. Long term goal 9: Pt will be IND using adaptive utensils to increase self-feeding skills.     LIAM Lyles/L   1/12/2021  10:27 AM

## 2021-01-12 NOTE — PROGRESS NOTES
[address]retchen [address]     Ph: [phone]  Fax: [phone]    [] Certification  [] Recertification []  Plan of Care  [] Progress Note [x] Discharge      To: Brett Blanton MD      From:  Myrna Muñoz, PT  Patient: Linda Hickman     : 1945  Diagnosis: L intracranial hemorrhage, right weakness     Date: 2021  Treatment Diagnosis: impaired balance, impaired gait, decreased LE strength    Plan of Care/Certification Expiration Date: 21  Progress Report Period from:  2020  to 2021    Total # of Visits to Date: 23   No Show: 0    Canceled Appointment: 0     OBJECTIVE:   Short Term Goals - Time Frame for Short term goals: 4 weeks    Goals Current/Discharge status  Met   Short term goal 1: Patient will be independent with bed mobility. Independent  [x] yes  [] no   Short term goal 2: Patient will be independent with transfers. Independent  [x] yes  [] no   Short term goal 3: Patient will be independent with HEP. Independent  [x] yes  [] no   Short term goal 4: Patient will perform dual tasking activity independently without LOB. (updated goal)  Independent - challenged with cognitive tasks [x] yes  [] no   Short term goal 5: Patient will peform obstacle course with improved coordination of LEs independently. (updated goal) Good coordination throughout, occasional difficulty stepping over hurdles due to vision [x]  yes  []  no     Long Term Goals - Time Frame for Long term goals : 8 weeks  Goals Current/ Discharge status Met   Long term goal 1: Patient will increase strength in bilateral LEs >/= 4+/5 for improved ambulation and transfers.  Strength RLE  R Hip Flexion: 4/5  R Hip Extension: 3+/5  R Hip ABduction: 4-/5  R Knee Flexion: 5/5  R Knee Extension: 5/5  R Ankle Dorsiflexion: 5/5  Strength LLE  L Hip Flexion: 4+/5  L Hip Extension: 4/5  L Hip ABduction: 4+/5  L Knee Flexion: 5/5  L Knee Extension: 5/5  L Ankle Dorsiflexion: 5/5 [x] yes  [x] no   Long term goal 2: Patient will ambulate with LRD 200ft independently with improved bilateral step length and symmetry. Ambulation 1  Surface: level tile  Device: No Device  Assistance: Independent  Quality of Gait: Decreased awareness at times of objects to the Rt, steady around track  Distance: 1/4 mile around track [x] yes  [] no   Long term goal 3: Patient will ascend/descend 12 steps using one HR with independently. (updated goal) Stairs  # Steps : 12  Stairs Height: 6\"  Rails: Right ascending  Device: No Device  Assistance: Independent  Comment: recip [x] yes  [] no   Long term goal 4: Duffy balance >/= 54/56 to demonstrate improved static balance. (updated goal) Duffy Balance Score: 54 [x] yes  [] no   Long term goal 5: DGI >/= 22/24 to demonstrate improved dynamic balance. (updated goal) Dynamic Gait Total Score: 21   [] yes  [x] no        Body structures, Functions, Activity limitations: Decreased functional mobility , Decreased strength, Decreased safe awareness, Decreased endurance, Decreased cognition, Decreased balance, Decreased coordination, Decreased posture  Assessment: P with no significant change in strength in jay LEs with MMT. Pt with some improvement in [place] and DGI testing. Pt with good balacne during DGI but must slow down to complete some tasks due to vision vs balance. Pt is able to ambulate and maintain balance with looking around but requires reminders to look to Rt. Pt is dong well overall and will be D/C'd to HEP at this time. Prognosis: Good    PLAN: [] Evaluate and Treat  Frequency/Duration:  Plan  Plan Comment: D/C to HEP                      Patient Status:[] Continue/ Initiate plan of Care    [x] Discharge PT. Recommend pt continue with HEP.      [] Additional visits requested, Please re-certify for additional visits:          Signature: Electronically signed by Ian Angulo PT on 1/12/21 at 9:06 AM EST      If you have any questions or concerns, please don't hesitate to call. Thank you for your referral.    I have reviewed this plan of care and certify a need for medically necessary rehabilitation services.     Physician Signature:__________________________________________________________  Date:  Please sign and return

## 2021-01-14 ENCOUNTER — HOSPITAL ENCOUNTER (OUTPATIENT)
Dept: SPEECH THERAPY | Age: 76
Setting detail: THERAPIES SERIES
Discharge: HOME | End: 2021-01-14
Payer: MEDICARE

## 2021-01-14 ENCOUNTER — HOSPITAL ENCOUNTER (OUTPATIENT)
Dept: OCCUPATIONAL THERAPY | Age: 76
Setting detail: THERAPIES SERIES
Discharge: HOME | End: 2021-01-14
Payer: MEDICARE

## 2021-01-14 PROCEDURE — 97530 THERAPEUTIC ACTIVITIES: CPT

## 2021-01-14 PROCEDURE — 92507 TX SP LANG VOICE COMM INDIV: CPT

## 2021-01-14 NOTE — PROGRESS NOTES
[facility] Outpatient  Speech Language Pathology  Adult Daily Note    Juan Antonio Campbell  : 1945    Date: 2021    Visit Information:  SLP Insurance Information: Medicare  Total # of Visits Approved: 35  Total # of Visits to Date: 3  No Show: 0  Canceled Appointment: 0        Plan of care signed (Y/N):     Yes    Certification Period: 21-21  Plan of Care Visit # 3      Interventions used this date:  Expressive Language, Receptive Language and Instruction in Compensatory Strategies    Subjective: pt reported reading an email yesterday Independently and it took her almost an hour. Pt reported practicing MOR passages, but said she didn't practice the \"Flat\" passage that had been assigned. Instead had read a different passage from the kyle. Clinician called and left a message with  to try and clarify which passage it was. Alert and Cooperative       Objective/Assessment:   Patient progressing towards goals:  1. To increase safety awareness, judgment and complex verbal expression, pt will complete abstract reasoning tasks (i.e. Word deduction, convergent and divergent naming, similarities/differences) with 90% accuracy and min cues. 2. Pt will complete sound-letter correspondence with phonological treatment program (PTP) with 90% acc Ind Goal met for consonants, move to vowels   Sound to letter correspondence for consonants  Set 1: 100% acc Independently  Set 2: 83% acc Independently  Set 3: 100% acc Ind  Set 4: 100% acc Ind  3.  Pt will complete letter-sound correspondence with phonological treatment program (PTP) with 90% acc Ind Goal met for consonants, move to vowels  Letter to sound correspondence consonants   Set 1: 100% acc Ind   Set 2: 100% acc Ind   Set 3: 100% acc Ind   Set 4: 100% acc Ind   4. Pt will increase words per minute (WPM) during Multiple Oral Re-reading (MOR) from 7.2 WPM to 10 WPM with standby cues to increase patient functional reading  With Passage 1 \"Get Well Card\" pt read 45 WPM with min verbal cues   Passage 2 \"Flat\" pt read 7.3 WPM with mod verbal cues. 5. Pt will increase word reading accuracy during Multiple Oral Re-reading (MOR) to 80% acc with standby cues to increase patients functional reading   With Passage 1 pt had 100% acc Mod I  With Passage 2 pt had 92% acc with mod verbal cues          Pain Assessment:  Initial Assessment:  Patient denies pain. Re-assessment:  Patient denies pain. Plan:  Continue with current goals    Patient/Caregiver Education:  Patient/Caregiver educated on session. Patient/Caregiver provided with home program: SLP provided patient with name of [employer] kyle to practice MOR passage. SLP provided new passage to practice \"Flat\" and wrote all information down. SLP provided work email for questions about home practice.    Multiple definitions worksheet  Time in: 1400  Time out: [address]  Minutes seen: 39     Signature: Electronically signed by Shelli Cockayne, SLP on 1/14/2021 at 2:59 PM

## 2021-01-14 NOTE — PROGRESS NOTES
Occupational Therapy  Daily Note     Name: Chris Hebert  : 1945  MRN: [id number]  Diagnosis:    L intracranial hemorrhage, R weakness, R neglect, R vision impairment    Visit Information:   Onset Date: 20  OT Insurance Information: Medicare  Total # of Visits Approved: ([employer] primary insurance (BMN))  Total # of Visits to Date: 25(3 in )  Progress Note Due Date: 21  Canceled Appointment: 1  Progress Note Counter:     Date: 2021  Time:   OT Therapeutic activities 59 minutes for 4 unit(s), CPT 76850       OT Individual Minutes  Time In: 6530  Time Out: 8453  Minutes: 61    Referring Practitioner: Dr. Sarah Bennett MD              Subjective:  Patient states that she is having difficulty reading digital clocks still. Pain rating:   Pre-treatment pain:    Pt denies pain    Action for pain:   No action necessary. Pain after treatment:      Pt denies pain         Focus of treatment was on the following:   fine motor/dexterity, right pinch strengthening, visual motor and visual/perceptual      Objective:    Treatment Activity:   Patient assessed for progress towards goals. This was also done on 2021, so only nine hole peg test was completed this date.       Coordination & Dexterity    Right   eval Right  12/10/20 Right  21 Right   21 Norm Left Eval 12/10 Norm   Nine Hole Peg Test  (seconds) 44.3 36.9 35.16 34.55 Female age 70-74: 20.2 s  NT 27.2 Female age 70-74: 22.0 s    Box & Blocks  (# of blocks) 34 13 32 NT Female age 70-74: 68.6 NT 38 Female age 70-74: 68.3   Comments: L hand missed 3 from box on 12/10,  Pt reported R hand \"felt twisted\" during box and blocks on 12/10 and kept tossing blocks into divider (not high enough).  Pt not using visual compensatory techniques to gauge height of divider during testing of R side.     The Upper Extremity Functional Index  The Upper Extremity Functional Scale  Any of your usual work, housework, or school activities: Moderate Difficulty  Your usual hobbies, recreational, or sporting activities: Quite a Bit of Difficulty(Mostly due to vision problems)  Lifting a bag of groceries to waist level: No Difficulty  Lifting a bag of groceries above your head: Quite a Bit of Difficulty  Grooming your hair: A Little Bit of Difficulty(can't curl hair)  Pushing up on your hands (eg from bathtub/chair): No Difficulty  Preparing food (eg peeling, cutting): Moderate Difficulty(Partially due to vision issues)  Driving: Extreme Difficulty or Unable to Perform Activity(Due to vision)  Vacuuming, sweeping, or raking: No Difficulty  Dressing: Moderate Difficulty(difficulty with bra (reaching arm behind back))  Doing up buttons: No Difficulty  Using tools or appliances: Moderate Difficulty  Opening doors: No Difficulty  Cleaning: A Little Bit of Difficulty  Tying or lacing shoes: No Difficulty  Sleeping: No Difficulty  Laundering clothes (eg washing, ironing, folding) : No Difficulty  Opening a jar: Moderate Difficulty  Throwing a ball: Moderate Difficulty  Carrying a small suitcase with your affected limb: No Difficulty  UEFS Score: 70  UEFS Disability Index: 1-19%  UEFS CMS Modifier: CI    Scoring:   UEFI Score = (Sum of Responses / 80) X 100    Error + / - 5 points, Minimum Level of Detectable Change (90% Confidence): 9 Points     Troy et al. (2001): Development and initial validation of the upper extremity functional index. Physiotherapy [facility]) 53 (4): 278-72       Patient reports difficulty with telling time on digital clock. Patient provided with worksheet with 6 digital and analog clocks that need matched. First patient was asked to read time from the digital clocks. Patient had mod to max difficulty with each one. Patient required verbal cues and increased time. Patient had difficulty reading numbers, getting some of them mixed up. Patient then asked to read analog clocks.  Patient with min difficulty with reading analog clocks, but did require increased time. Patient then asked to match analog to digital clocks. Patient had mod to max difficulty with this task and did not complete for all six. Patient provided with clock worksheet to complete and practice at home. Patient ended session with right pinch strengthening and coordination activity. Patient used right hand to depress 2# resistive clip and  beads and place them into holes on the peg board. Patient had min to mod difficulty with this task due to vision problems and aligning the clip to  the beads. Patient completed with 40 beads. Patient then removed the beads from the board. Patient reported fatigue in hand at end. Patient required few short rest breaks. Patient reported no increase in pain. Discussed previous HEP: yes, Pt verbally confirmed compliant with HEP's    Comments:     Assessment:   Pt tolerated treatment well. Plan:   Update POC. New POC for 2x/week for 5-6 weeks or 10-12 visits. Goals:     Long term goals  Time Frame for Long term goals : 2 x/week for 12 weeks  Long term goal 1: Patient will be Supervised with all recommended HEP's, adaptive strategies, and adaptive techniques. Long term goal 2: Patient will increase R  strength from current by 10 lbs to increase performance with I/ADL's. Long term goal 3: Patient will increase R pinch strength from current by 5 lbs to increase performance with I/ADL's. Long term goal 4: Patient will increase dexterity in R hand as observed by 9 hole peg test by completing WFL to increase performance with I/ADL's. Long term goal 5: Patient will increase RUE coordination as observed by box and blocks by completing WFL to increase performance with I/ADL's. Long term goals 6: Patient will improve  RUE sensation and/or utilize compensatory techniques for safe completion of self-care as projected.   Long term goal 7: Patient will scan environment to the right side without verbal cues to increase safety and performance with functional activities. Long term goal 8: Pt will complete clock drawing test for further assessment of visual perceptual skills. Long term goal 9: Pt will be IND using adaptive utensils to increase self-feeding skills.     Signature: Electronically signed by Tanmay Bell OT on 1/14/2021 at 4:06 PM

## 2021-01-14 NOTE — PROGRESS NOTES
OCCUPATIONAL THERAPY PROGRESS NOTE  [x]  [address] [facility] [address] Jamison 81        Ph: [phone]         Fax: [phone]          []  [facility]         [address] [place] [address]         Ph: [phone]         Fax: [phone]        [] Certification     [] Recertification     [x] Plan of Care    [] Progress Note        Date: 2021    To:Referring Practitioner: Dr. Consuelo Ward MD          From: Sun Yuen OT  Patient: Trnii Dewey       : 1945  MRN: [id number]  Diagnosis:Diagnosis:    L intracranial hemorrhage, R weakness, R neglect, R vision impairment   Date of eval: 9/10/2020     Visit Information:   Onset Date: 20  OT Insurance Information: Medicare  Total # of Visits Approved: ([facility] primary insurance ([facility]))  Total # of Visits to Date: 25(3 in )  Progress Note Due Date: 21  Canceled Appointment: 1  Progress Note Counter:     Last POC date: 2020   Reporting period: 12/10/2020 to 2020                            Assessment:    Goals Current/Discharge status  Met   Long term goal 1: Patient will be Supervised with all recommended HEP's, adaptive strategies, and adaptive techniques. ongoing [] Met  [] Partially Met  [] Not Met   Long term goal 2: Patient will increase R  strength from current by 10 lbs to increase performance with I/ADL's. Measurements from 2021  Eval: 18.3  Previous: 25  Current: 33  Norm: 46 lb [] Met  [] Partially Met  [] Not Met   Long term goal 3: Patient will increase R pinch strength from current by 5 lbs to increase performance with I/ADL's.  Measurements from 2021  Palmar Pinch  Eval: 6.57  Previous: 6.67  Current: 9  Norm: 9.5  Lateral Pinch  Eval: NT  Previous: 10  Current: 11  Norm: 11 [] Met  [x] Partially Met  [] Not Met   Long term goal 4: Patient will increase dexterity in R hand as observed by 9 hole peg test by completing WFL to increase performance with I/ADL's. Eval: NT  Previous: 35.16 sec  Current: 34.55  Norm: 20.2   [] Met  [x] Partially Met  [] Not Met   Long term goal 5: Patient will increase RUE coordination as observed by box and blocks by completing WFL to increase performance with I/ADL's. Measurements from 1/7/2021  Eval: NT  Previous: 13  Current:32  Norm: 68.6 [] Met  [x] Partially Met  [] Not Met   Long term goals 6: Patient will improve  RUE sensation and/or utilize compensatory techniques for safe completion of self-care as projected. Goal Met on 12/10/2020 [x] Met  [] Partially Met  [] Not Met   Long term goal 7: Patient will scan environment to the right side without verbal cues to increase safety and performance with functional activities. From discussion with patient on 1/7/2021. Patient displays increased scanning to right side but continues to require min to mod cues. [] Met  [x] Partially Met  [] Not Met    Long term goal 8: Pt will complete clock drawing test for further assessment of visual perceptual skills. From discussion with patient on 1/7/2021. Patient able to put numbers in on clock, but continues with difficulties putting the correct time on the clock with the minute and hour hands. [] Met  [x] Partially Met  [] Not Met   Long term goal 9: Pt will be IND using adaptive utensils to increase self-feeding skills. Goal met previously. [x] Met  [] Partially Met  [] Not Met     From 1/7/2021  Functional assessment used:  Activity Measure for [facility])  Score on eval:   AM-PAC Inpatient Daily Activity Raw Score: 14  AM-PAC Inpatient ADL T-Scale Score : 33.39  ADL Inpatient CMS 0-100% Score: 59.67  ADL Inpatient CMS G-Code Modifier : CK  Score currently:   AM-PAC Inpatient Daily Activity Raw Score: 22  AM-PAC Inpatient ADL T-Scale Score : 47.1  ADL Inpatient CMS 0-100% Score: 25.8  ADL Inpatient CMS G-Code Modifier : CJ    Completed new functional assessment this date for Upper Extremity Functioning. Functional assessment used: Upper Extremity Functional Index  Score currently:   UEFS Score: 70  UEFS Disability Index: 1-19%  UEFS CMS Modifier: CI    TREATMENT PLAN:    [x] Evaluate & Treat  [x] Re-evaluation  [] Pain Management  [] Edema Management  [] Wound Care/Scar Management  [x] ADL Training  [] Tendon Repair Program  [] Aquatic Therapy  [x] Instruction/Application of energy conservation, work simplification, joint protection, body mechanics   [x] Pt able to work with Tasha Gino  [x] D/C plan: Will assess pt after established visits to determine need for continued therapy. [x] Neuromuscular Re-education  [] Tissue (stress) Loading Program  [x] PROM/Stretching/AAROM/AROM  [] Splinting  [] Desensitization  [x] Strengthening/Graded Therapeutic Activity  [x] Coordination/Dexterity Training  [x] Manual Techniques  [x] Instruction in HEP  [x] Modalities: [] Ultrasound [] Infrared                 [] Hot/cold pack [] Paraffin                  [] Electrical stimulation                  [] Other:                                 Frequency/Duration:  2 per week for 5-6 weeks or 10-12 visits. Rehab Potential: [] Excellent [x] Good     [] Fair [] Poor      Patient Status: [x] Continue/Initate plan of Care   []  Discharge   [x]  Additional visits requested, please re-certify for additional visits        Electronically signed by:  Malissa Hopkins OT 1/14/2021 4:07 PM    If you have any questions or concerns, please don't hesitate to call. Thank you for your referral.      I have reviewed this plan of care and certify a need for medically necessary rehabilitation services.     Physician Signature:__________________________________________________________    Date: 1/14/2021  Please sign and return

## 2021-01-19 ENCOUNTER — HOSPITAL ENCOUNTER (OUTPATIENT)
Dept: SPEECH THERAPY | Age: 76
Setting detail: THERAPIES SERIES
Discharge: HOME | End: 2021-01-19
Payer: MEDICARE

## 2021-01-19 PROCEDURE — 92507 TX SP LANG VOICE COMM INDIV: CPT

## 2021-01-19 NOTE — PROGRESS NOTES
cues.   New passage 3 \"the dentist visit\" pt read 19 WPM   5. Pt will increase word reading accuracy during Multiple Oral Re-reading (MOR) to 80% acc with standby cues to increase patients functional reading   With Passage 2 pt had 94% acc with min verbal cues  Initial baseline Passage 3 pt had 96% acc with mod verbal cues          Pain Assessment:  Initial Assessment:  Patient denies pain. Re-assessment:  Patient denies pain. Plan:  Continue with current goals    Patient/Caregiver Education:  Patient/Caregiver educated on session.   Patient/Caregiver provided with home program: Continue MOR passages and double meaning deduction worksheet  Time in: 1400  Time out: [address]  Minutes seen: 45     Signature: Electronically signed by Royal Johnson SLP on 1/19/2021 at 3:01 PM

## 2021-01-21 ENCOUNTER — HOSPITAL ENCOUNTER (OUTPATIENT)
Dept: SPEECH THERAPY | Age: 76
Setting detail: THERAPIES SERIES
Discharge: HOME | End: 2021-01-21
Payer: MEDICARE

## 2021-01-21 ENCOUNTER — HOSPITAL ENCOUNTER (OUTPATIENT)
Dept: OCCUPATIONAL THERAPY | Age: 76
Setting detail: THERAPIES SERIES
Discharge: HOME | End: 2021-01-21
Payer: MEDICARE

## 2021-01-21 PROCEDURE — 97530 THERAPEUTIC ACTIVITIES: CPT

## 2021-01-21 PROCEDURE — 92507 TX SP LANG VOICE COMM INDIV: CPT

## 2021-01-21 NOTE — PROGRESS NOTES
[facility]  Speech Language Pathology  Adult Daily Note    Adria Gallegos  : 1945    Date: 2021    Visit Information:  SLP Insurance Information: Medicare  Total # of Visits Approved: 35  Total # of Visits to Date: 5  No Show: 0  Canceled Appointment: 0        Plan of care signed (Y/N):     Yes    Certification Period: 21-21  Plan of Care Visit # 5      Interventions used this date:  Expressive Language, Receptive Language and Instruction in Compensatory Strategies    Subjective: pt reported     Alert and Cooperative       Objective/Assessment:   Patient progressing towards goals:  1. To increase safety awareness, judgment and complex verbal expression, pt will complete abstract reasoning tasks (i.e. Word deduction, convergent and divergent naming, similarities/differences) with 90% accuracy and min cues. Reported reading double meaning deduction definitions at home. Re-read them in therapy with mod verbal cues and generated word definitions with 90% acc Ind.  2. Pt will complete sound-letter correspondence with phonological treatment program (PTP) with 90% acc Ind Goal met for consonants, move to vowels   SLP reviewed sound-letter correspondence for set 5 of vowels  SLP reviewed vowel combinations and key words for each vowel with patient. Patient wrote 6/6 vowels with visual model and required mod verbal cues to write key words. Provided patient with set of note cards with vowels for home practice. 3. Pt will complete letter-sound correspondence with phonological treatment program (PTP) with 90% acc Ind Goal met for consonants, move to vowels  Not addressed   4. Pt will increase words per minute (WPM) during Multiple Oral Re-reading (MOR) from 7.2 WPM to 10 WPM with standby cues to increase patient functional reading   Passage 2 \"Flat\" pt read 19 WPM with min verbal cues. New passage 3 \"the dentist visit\" pt read 20.9 WPM   5.  Pt will increase word reading accuracy during Multiple Oral Re-reading (MOR) to 80% acc with standby cues to increase patients functional reading   With Passage 2 pt had 90% acc with min verbal cues  Initial baseline Passage 3 pt had 84% acc with mod verbal cues          Pain Assessment:  Initial Assessment:  Patient denies pain. Re-assessment:  Patient denies pain. Plan:  Continue with current goals    Patient/Caregiver Education:  Patient/Caregiver educated on session.   Patient/Caregiver provided with home program: MOR passages 2 and 3, PTP vowel set 5    Time in: 1400  Time out: [address]  Minutes seen: 45     Signature: Electronically signed by Nimisha Bennett, SLP on 1/21/2021 at 2:55 PM

## 2021-01-21 NOTE — PROGRESS NOTES
Occupational Therapy  Daily Note     Name: Georgette Jenkins  : 1945  MRN: [id number]  Diagnosis:    L intracranial hemorrhage, R weakness, R neglect, R vision impairment    Visit Information:   Onset Date: 20  OT Insurance Information: Medicare  Total # of Visits Approved: (BMN)  Total # of Visits to Date: 26(4 in )  Progress Note Due Date: 21  Progress Note Counter: 1/10-12    Date: 2021  Time:   OT Therapeutic activities 55 minutes for 4 unit(s), CPT 65785       OT Individual Minutes  Time In: 1300  Time Out: 5358  Minutes: 54    Referring Practitioner: Dr. Collette Chain, MD              Subjective: Patient states she is continuing to struggle with reading digital clocks. Patient reports she attempted to complete the clock worksheet at home, but did struggle. Pain rating:   Pre-treatment pain:    Pt denies pain    Action for pain:   No action necessary. Pain after treatment:      Pt denies pain         Focus of treatment was on the following:   fine motor/dexterity, right pinch strengthening, visual motor and visual/perceptual         Objective:    Treatment Activity:   Patient reports difficulty with telling time on digital clock. Patient engaged in visual/perceptual activity using digital clock on the computer. Patient reports difficulty with the number, but the most difficulty understanding the time changing to the new hour (example: 6:59 to 7:00). Therapist worked with patient on telling time and understanding the changing of the clock at the new hour. Patient then engaged in activity identifying numbers and fine motor coordination. Therapist provided the patient with a paper with 1 though 59 (to represent the 59 minutes on a clock before switching to the new hour. Therapist then told the patient a number and then patient used her right hand to  a susy and cover the stated number.  Patient had min difficulty picking up the pennies, but had mod difficulty finding the correct number to place the susy on. Patient required mod verbal cues for scanning and then required min to mod assist selecting about 1/4 of the numbers. Patient completed for 39 of the 59 numbers. Patient ended session with right pinch strengthening and coordination while scanning. Patient used 4lb resistive clip in right hand to  blocks and make stacks of 5. Patient completed for 40 blocks. Patient had min difficulty picking up blocks but required short rest breaks due to fatigue in right hand. Patient had mod difficulty aligning blocks to stack them due to vision. Discussed previous HEP: yes, Pt verbally confirmed compliant with HEP's    Comments:     Assessment:   Pt tolerated treatment fair. Plan:   Continue POC    Goals:     Long term goals  Time Frame for Long term goals : 2 x/week for 12 weeks  Long term goal 1: Patient will be Supervised with all recommended HEP's, adaptive strategies, and adaptive techniques. Long term goal 2: Patient will increase R  strength from current by 10 lbs to increase performance with I/ADL's. Long term goal 3: Patient will increase R pinch strength from current by 5 lbs to increase performance with I/ADL's. Long term goal 4: Patient will increase dexterity in R hand as observed by 9 hole peg test by completing WFL to increase performance with I/ADL's. Long term goal 5: Patient will increase RUE coordination as observed by box and blocks by completing WFL to increase performance with I/ADL's. Long term goals 6: Patient will improve  RUE sensation and/or utilize compensatory techniques for safe completion of self-care as projected. Long term goal 7: Patient will scan environment to the right side without verbal cues to increase safety and performance with functional activities. Long term goal 8: Pt will complete clock drawing test for further assessment of visual perceptual skills.   Long term goal 9: Pt will be IND using adaptive utensils to increase self-feeding skills.     Signature: Electronically signed by Merary Gandara OT on 1/21/2021 at 4:30 PM

## 2021-01-26 ENCOUNTER — HOSPITAL ENCOUNTER (OUTPATIENT)
Dept: SPEECH THERAPY | Age: 76
Setting detail: THERAPIES SERIES
Discharge: HOME | End: 2021-01-26
Payer: MEDICARE

## 2021-01-26 ENCOUNTER — HOSPITAL ENCOUNTER (OUTPATIENT)
Dept: OCCUPATIONAL THERAPY | Age: 76
Setting detail: THERAPIES SERIES
Discharge: HOME | End: 2021-01-26
Payer: MEDICARE

## 2021-01-26 PROCEDURE — 97530 THERAPEUTIC ACTIVITIES: CPT

## 2021-01-26 PROCEDURE — 92507 TX SP LANG VOICE COMM INDIV: CPT

## 2021-01-26 NOTE — PROGRESS NOTES
Occupational Therapy  Daily Note     Name: Malika Cole  : 1945  MRN: [id number]  Diagnosis:    L intracranial hemorrhage, R weakness, R neglect, R vision impairment    Visit Information:   Onset Date: 20  OT Insurance Information: Medicare  Total # of Visits Approved: (BMN)  Total # of Visits to Date: 27  Progress Note Due Date: 21  Progress Note Counter: 2/10-12    Date: 2021  OT Therapeutic activities 60 minutes for 4 unit(s), CPT 14317     OT Individual Minutes  Time In: 1300  Time Out: 1400  Minutes: 61    Referring Practitioner: Dr. Chely Lam MD    Subjective:  \"I have been playing bingo at home. \"      Pain rating:   Pre-treatment pain:    Pt denies pain    Action for pain:   No action necessary. Pain after treatment:      Pt denies pain         Focus of treatment was on the following:   coordination, education/training, fine motor/dexterity and visual/perceptual     Objective:    Treatment Activity:     Bingo activity using pennies to cover randomly stated numbers 1-16 written and scrambled in grid. Container placed on R side to facilitate scanning. Pt able to scan, but more often using hand to find container and retrieve pennies. Pt made 3 errors placing susy on correct number and 3 errors removing pennies and saying the number under the susy. Completed 2nd trial with lower case letters. Pt with 2 errors placing on correct letter, no error saying letter after removing susy. Pt then completed coordination activity with bean bag toss. Pt with accuracy 15/27 at 6 ft away. Pt then javid clock face and placed in hour markers. Pt with good placement with fair pace to put in. Pt not able to set time to 1:37. Pt kept placing markers at 1 and 3. Pt stated has flash cards of analog clocks at home, but has not used. Comments: Pt stated has 3 month follow up with physician. Assessment:   Pt tolerated treatment well.     Plan:   Continue POC    Goals:     Long term goals  Time Frame for Long term goals : 2 x/week for 12 weeks  Long term goal 1: Patient will be Supervised with all recommended HEP's, adaptive strategies, and adaptive techniques. Long term goal 2: Patient will increase R  strength from current by 10 lbs to increase performance with I/ADL's. Long term goal 3: Patient will increase R pinch strength from current by 5 lbs to increase performance with I/ADL's. Long term goal 4: Patient will increase dexterity in R hand as observed by 9 hole peg test by completing WFL to increase performance with I/ADL's. Long term goal 5: Patient will increase RUE coordination as observed by box and blocks by completing WFL to increase performance with I/ADL's. Long term goals 6: Patient will improve  RUE sensation and/or utilize compensatory techniques for safe completion of self-care as projected. Long term goal 7: Patient will scan environment to the right side without verbal cues to increase safety and performance with functional activities. Long term goal 8: Pt will complete clock drawing test for further assessment of visual perceptual skills. Long term goal 9: Pt will be IND using adaptive utensils to increase self-feeding skills.     Jerilyn Scott OTR/L   1/26/2021  3:17 PM

## 2021-01-26 NOTE — PROGRESS NOTES
[facility] Out[facility]  Speech Language Pathology  Adult Daily Note    Balbir Reilly  : 1945    Date: 2021    Visit Information:  SLP Insurance Information: Medicare  Total # of Visits Approved: 35  Total # of Visits to Date: 6  No Show: 0  Canceled Appointment: 0        Plan of care signed (Y/N):     Yes    Certification Period: 21-21  Plan of Care Visit # 6      Interventions used this date:  Expressive Language, Receptive Language and Instruction in Compensatory Strategies    Subjective: pt reported that she worked on numbers and scanning in OT. She stated that after last session she went home and became emotional about her current reading deficits.  confirmed that he received the email with [id number] video link for vowel practice sent last . Alert and Cooperative       Objective/Assessment:   Patient progressing towards goals:  1. To increase safety awareness, judgment and complex verbal expression, pt will complete abstract reasoning tasks (i.e. Word deduction, convergent and divergent naming, similarities/differences) with 90% accuracy and min cues. Completed verbal similarities/differences task with 80% acc min verbal cues   2. Pt will complete sound-letter correspondence with phonological treatment program (PTP) with 90% acc Ind Goal met for consonants, move to vowels   SLP reviewed sound-letter correspondence for set 5 of vowels  SLP reviewed vowel combinations and key words for each vowel with patient. Patient wrote 4/6 vowels with mod verbal cues and wrote key words with 3/13 Ind and increased to 100% acc after verbal model of key words from clinician. Provided patient with set of note cards with vowels for home practice.    3. Pt will complete letter-sound correspondence with phonological treatment program (PTP) with 90% acc Ind Goal met for consonants, move to vowels  Not addressed   4. Pt will increase words per minute (WPM) during Multiple Oral Re-reading (MOR) from 7.2 WPM to 10 WPM with standby cues to increase patient functional reading   Passage 2 \"Flat\" pt read 28 WPM Independently. Goal met for Passage 2   New passage 3 \"the dentist visit\" pt read 26 WPM with min-mod cues  5. Pt will increase word reading accuracy during Multiple Oral Re-reading (MOR) to 80% acc with standby cues to increase patients functional reading   With Passage 2 pt had 98% acc Independently. Goal met for Passage 2. Initial baseline Passage 3 pt had 97% acc with min verbal cues          Pain Assessment:  Initial Assessment:  Patient denies pain. Re-assessment:  Patient denies pain. Plan:  Continue with current goals    Patient/Caregiver Education:  Patient/Caregiver educated on session.   Patient/Caregiver provided with home program: MOR passages 3, PTP vowel set 5, similarities/differences worksheet    Time in: 1400  Time out: 026 848 14 90  Minutes seen: 45     Signature: Electronically signed by Salty Del Rosario SLP on 1/26/2021 at 3:01 PM

## 2021-01-28 ENCOUNTER — HOSPITAL ENCOUNTER (OUTPATIENT)
Dept: OCCUPATIONAL THERAPY | Age: 76
Setting detail: THERAPIES SERIES
Discharge: HOME | End: 2021-01-28
Payer: MEDICARE

## 2021-01-28 ENCOUNTER — HOSPITAL ENCOUNTER (OUTPATIENT)
Dept: SPEECH THERAPY | Age: 76
Setting detail: THERAPIES SERIES
Discharge: HOME | End: 2021-01-28
Payer: MEDICARE

## 2021-01-28 PROCEDURE — 92507 TX SP LANG VOICE COMM INDIV: CPT

## 2021-01-28 PROCEDURE — 97530 THERAPEUTIC ACTIVITIES: CPT

## 2021-01-28 NOTE — PROGRESS NOTES
[facility] Outpatient  Speech Language Pathology  Adult Daily Note    Victorina Martines  : 1945    Date: 2021    Visit Information:  SLP Insurance Information: Medicare  Total # of Visits Approved: 35  Total # of Visits to Date: 7  No Show: 0  Canceled Appointment: 0        Plan of care signed (Y/N):     Yes    Certification Period: 21-21  Plan of Care Visit # 7      Interventions used this date:  Expressive Language, Receptive Language and Instruction in Compensatory Strategies    Subjective: pt reported that she worked on [employer] reading in [facility]. Pt was cooperative. Alert and Cooperative       Objective/Assessment:   Patient progressing towards goals:  1. To increase safety awareness, judgment and complex verbal expression, pt will complete abstract reasoning tasks (i.e. Word deduction, convergent and divergent naming, similarities/differences) with 90% accuracy and min cues. Completed verbal Which one doesn't belong task with 90% acc Mod I  2. Pt will complete sound-letter correspondence with phonological treatment program (PTP) with 90% acc Ind Goal met for consonants, move to vowels   SLP reviewed sound-letter correspondence for set 5 of vowels. Patient wrote 4/6 vowels with mod verbal cues and wrote key words with 1/13 Ind and increased to 100% acc after verbal model of key words from clinician. Provided patient with set of note cards with vowels for home practice. 3. Pt will complete letter-sound correspondence with phonological treatment program (PTP) with 90% acc Ind Goal met for consonants, move to vowels  Not addressed   4. Pt will increase words per minute (WPM) during Multiple Oral Re-reading (MOR) from 7.2 WPM to 10 WPM with standby cues to increase patient functional reading   Passage 3 \"the dentist visit\" pt read 39 WPM with standby cues. Goal met for Passage 3  New passage 4 \"Who is Esthela Mariscal? \" pt read 7.9 WPM with mod cues  5.  Pt will increase word reading accuracy during Multiple Oral Re-reading (MOR) to 80% acc with standby cues to increase patients functional reading   With Passage 3 pt had 96% acc Independently. Goal met for passage 3. Initial baseline Passage 4 pt had 79% acc Independently. SLP noted patient attempting to use phonemic strategies          Pain Assessment:  Initial Assessment:  Patient denies pain. Re-assessment:  Patient denies pain. Plan:  Continue with current goals    Patient/Caregiver Education:  Patient/Caregiver educated on session.   Patient/Caregiver provided with home program: MOR passages 4, PTP vowel set 5,     Time in: 1400  Time out: [address]  Minutes seen: 45     Signature: Electronically signed by ARRON Garcia on 1/28/2021 at 2:54 PM

## 2021-02-02 ENCOUNTER — HOSPITAL ENCOUNTER (OUTPATIENT)
Dept: OCCUPATIONAL THERAPY | Age: 76
Setting detail: THERAPIES SERIES
Discharge: HOME | End: 2021-02-02
Payer: MEDICARE

## 2021-02-02 ENCOUNTER — HOSPITAL ENCOUNTER (OUTPATIENT)
Dept: SPEECH THERAPY | Age: 76
Setting detail: THERAPIES SERIES
Discharge: HOME | End: 2021-02-02
Payer: MEDICARE

## 2021-02-02 PROCEDURE — 97530 THERAPEUTIC ACTIVITIES: CPT

## 2021-02-02 PROCEDURE — 92507 TX SP LANG VOICE COMM INDIV: CPT

## 2021-02-02 NOTE — PROGRESS NOTES
[id number] [facility]  Speech Language Pathology  Adult Daily Note    Chris Hebert  : 1945    Date: 2021    Visit Information:  SLP Insurance Information: Medicare  Total # of Visits Approved: 35  Total # of Visits to Date: 8  No Show: 0  Canceled Appointment: 0        Plan of care signed (Y/N):     Yes    Certification Period: 21-21  Plan of Care Visit # 8      Interventions used this date:  Expressive Language, Receptive Language and Instruction in Compensatory Strategies    Subjective: pt reported that she worked on numbers in OT and it was difficult. Reported reading a passage from readers digest over the weekend and compared to a few months ago when she attempted it felt like it was easier. Had to have increased processing time for comprehension. Alert and Cooperative       Objective/Assessment:   Patient progressing towards goals:  1. To increase safety awareness, judgment and complex verbal expression, pt will complete abstract reasoning tasks (i.e. Word deduction, convergent and divergent naming, similarities/differences) with 90% accuracy and min cues. Completed written abstract category task with 50% acc Ind and increased to 80% acc min cues. Challenged with this task secondary to decreased spelling/swriting deficits  2. Pt will complete sound-letter correspondence with phonological treatment program (PTP) with 90% acc Ind Goal met for consonants, move to vowels   SLP reviewed sound-letter correspondence for set 5 of vowels. Patient wrote 5/6 vowels with mod verbal cues and wrote key words with 3/13 Ind and increased to 100% acc after verbal model of key words from clinician. Provided patient with set of note cards with vowels for home practice.    3. Pt will complete letter-sound correspondence with phonological treatment program (PTP) with 90% acc Ind Goal met for consonants, move to vowels  Letter-sound correspondence when given visual written vowel combination 33% acc Ind  4. Pt will increase words per minute (WPM) during Multiple Oral Re-reading (MOR) from 7.2 WPM to 10 WPM with standby cues to increase patient functional reading  New passage 4 \"Who is Vanlizbeth Graver? \" pt read 20.9 WPM with min-mod cues  5. Pt will increase word reading accuracy during Multiple Oral Re-reading (MOR) to 80% acc with standby cues to increase patients functional reading   Initial baseline Passage 4 pt had 87% acc Independently and increased to 95% acc with min-mod cues. SLP noted patient attempting to use phonemic strategies and had 2x self corrections          Pain Assessment:  Initial Assessment:  Patient denies pain. Re-assessment:  Patient denies pain. Plan:  Continue with current goals    Patient/Caregiver Education:  Patient/Caregiver educated on session.   Patient/Caregiver provided with home program: written abstract category task     Time in: 1400  Time out: 12  Minutes seen: 45     Signature: Electronically signed by ARRON Colon on 2/2/2021 at 2:55 PM

## 2021-02-02 NOTE — PROGRESS NOTES
Occupational Therapy  Daily Note     Name: Malika Cole  : 1945  MRN: [id number]  Diagnosis:    L intracranial hemorrhage, R weakness, R neglect, R vision impairment    Visit Information:   Onset Date: 20  OT Insurance Information: Medicare  Total # of Visits Approved: (BMN)  Total # of Visits to Date: 29(7 in )  Progress Note Due Date: 21  Progress Note Counter: 4/10-12    Date: 2021  Time:   OT Therapeutic activities 55 minutes for 4 unit(s), CPT 53068       OT Individual Minutes  Time In: 1300  Time Out: 5543  Minutes: 54    Referring Practitioner: Dr. Chely Lam MD              Subjective:  Patient states nothing new to report. Pain rating:   Pre-treatment pain:    Pt denies pain    Action for pain:   No action necessary. Pain after treatment:      Pt denies pain         Focus of treatment was on the following:   fine motor/dexterity, right pinch strengthening, visual motor and visual/perceptual      Objective:    Treatment Activity:   Patient then engaged in activity identifying numbers to increase visual motor and visual perceptual skills. Therapist provided the patient with a paper with 1 through 9 on a paper in random order with three of each number. Therapist then told the patient a number and then patient used her right hand to  a susy and cover the stated number for all three times it was on the paper. Patient had min difficulty picking up the pennies and min difficulty finding the correct number to place the ssuy on. Patient had mod difficulty with the number three, but still able to complete with increased time. Patient occasionally covered one eye and squinted to assist with double vision. Patient then completed this activity with a series of double digit numbers. Patient was had mod to max difficulty identifying the double digit numbers, especially since they were out of order. Patient completed for 5 numbers with increased time.   Patient required mod verbal cues for scanning. Patient then engaged in additional pinch strengthening and coordination activity for right UE. Patient performs shoulder arc, using 2# clip in right  hand to transfer rings from one side of the arc to the other. Patient completed activity for two sets of rings. Patient tolerates well. Patient required min verbal cues for proper placement of hand on clip to avoid compensatory shoulder movements. Patient ended session with visual motor and perceptual activity on computer. Using [employer], patient engaged in activity to find 10 differences in two pictures. Patient found 8/10 with a fair pace. Patient required min verbal cues to scan both pictures. Discussed previous HEP: yes, Pt verbally confirmed compliant with HEP's    Comments:     Assessment:   Pt tolerated treatment well. Patient noted with min difficulty identifying single digit numbers, but mod to max difficulty with double digit numbers. However when patient asked to write double digit numbers, patient had no difficulty with writing the correct number. Patient would benefit from continued OT services to increase performance and safety in ADL and IADL tasks. Plan:   Continue POC    Goals:     Long term goals  Time Frame for Long term goals : 2 x/week for 12 weeks  Long term goal 1: Patient will be Supervised with all recommended HEP's, adaptive strategies, and adaptive techniques. Long term goal 2: Patient will increase R  strength from current by 10 lbs to increase performance with I/ADL's. Long term goal 3: Patient will increase R pinch strength from current by 5 lbs to increase performance with I/ADL's. Long term goal 4: Patient will increase dexterity in R hand as observed by 9 hole peg test by completing WFL to increase performance with I/ADL's. Long term goal 5: Patient will increase RUE coordination as observed by box and blocks by completing WFL to increase performance with I/ADL's.   Long term goals 6: Patient will improve  RUE sensation and/or utilize compensatory techniques for safe completion of self-care as projected. Long term goal 7: Patient will scan environment to the right side without verbal cues to increase safety and performance with functional activities. Long term goal 8: Pt will complete clock drawing test for further assessment of visual perceptual skills. Long term goal 9: Pt will be IND using adaptive utensils to increase self-feeding skills.     Signature: Electronically signed by Trinity Butler OT on 2/2/2021 at 4:28 PM OR

## 2021-02-04 ENCOUNTER — HOSPITAL ENCOUNTER (OUTPATIENT)
Dept: SPEECH THERAPY | Age: 76
Setting detail: THERAPIES SERIES
Discharge: HOME | End: 2021-02-04
Payer: MEDICARE

## 2021-02-04 ENCOUNTER — HOSPITAL ENCOUNTER (OUTPATIENT)
Dept: OCCUPATIONAL THERAPY | Age: 76
Setting detail: THERAPIES SERIES
Discharge: HOME | End: 2021-02-04
Payer: MEDICARE

## 2021-02-04 PROCEDURE — 97530 THERAPEUTIC ACTIVITIES: CPT

## 2021-02-04 PROCEDURE — 92507 TX SP LANG VOICE COMM INDIV: CPT

## 2021-02-04 NOTE — PROGRESS NOTES
OCCUPATIONAL THERAPY PROGRESS NOTE  [x]  [address] [facility] [address]        Ph: [phone]         Fax: [phone]          []  [address], [address] [place] [address]         Ph: [phone]         Fax: [phone]        [] Certification     [] Recertification     [] Plan of Care    [x] Progress Note        Date: 2021    To:Referring Practitioner: Dr. Haydee Huitron MD            From: Katlyn Seth, OTR/L  Patient: Norma Zhu       : 1945  MRN: [id number]  Diagnosis:Diagnosis:    L intracranial hemorrhage, R weakness, R neglect, R vision impairment   Date of eval: 9/10/2020      Visit Information:   Onset Date: 20  OT Insurance Information: Medicare  Total # of Visits Approved: (BMN)  Total # of Visits to Date: 30  Progress Note Due Date: 21  Progress Note Counter: 5/10-12     Reporting period: 2021 to 2021                            Assessment:    Goals Current/Discharge status  Met   Long term goal 1: Patient will be Supervised with all recommended HEP's, adaptive strategies, and adaptive techniques. Ongoing  [] Met  [x] Partially Met  [] Not Met   Long term goal 2: Patient will increase R  strength from current by 10 lbs to increase performance with I/ADL's. Progress made. See chart below. [] Met  [x] Partially Met  [] Not Met   Long term goal 3: Patient will increase R pinch strength from current by 5 lbs to increase performance with I/ADL's. Progress made. See chart below. [] Met  [x] Partially Met  [] Not Met   Long term goal 4: Patient will increase dexterity in R hand as observed by 9 hole peg test by completing WFL to increase performance with I/ADL's. Progress made. See chart below.   [] Met  [x] Partially Met  [] Not Met   Long term goal 5: Patient will increase RUE coordination as observed by box and blocks by completing WFL to increase performance with I/ADL's. Progress made. See chart below. [] Met  [x] Partially Met  [] Not Met   Long term goals 6: Patient will improve  RUE sensation and/or utilize compensatory techniques for safe completion of self-care as projected. Goal met on 12/10/2020. [x] Met  [] Partially Met  [] Not Met   Long term goal 7: Patient will scan environment to the right side without verbal cues to increase safety and performance with functional activities. Pt requires Min VC's during activities to scan to R side. [] Met  [x] Partially Met  [] Not Met   Long term goal 8: Pt will complete clock drawing test for further assessment of visual perceptual skills. Pt with better time and able to correct mistakes when placing hour and min markers. Pt still with difficulty placing hands when asked to set random time. [] Met  [x] Partially Met  [] Not Met   Long term goal 9: Pt will be IND using adaptive utensils to increase self-feeding skills. Goal previously met.   [x] Met  [] Partially Met  [] Not Met      & Pinch Strength  Average of 3 tries Right eval Norm 2/4/21    (lb) 18.3 Female age 70-74: 46 lbs  26.6   Arvizu Pinch (lb) 6.67 Female age 70-74: 9.5 lbs  8.3   Lateral Pinch (lb) NT Female age 70-74: 11.0 lbs  11   Comments: lateral pinch NT d/t time constraints on eval.      Coordination & Dexterity    Right   eval 12/10/20 1/7/21  1/14/21 2/4/21 Norm   Nine Hole Peg Test  (seconds) 44.3 36.9 35.16 34.55 30.42 Female age 70-74: 20.2 s    Box & Blocks  (# of blocks) 34 13 32 NT 39 Female age 70-74: 68.6   Comments: 2/4: pt at times crossed barrier during box and blocks, 1 miss not counted.      Functional assessment used: Upper Extremity Functional Index  Score from 1/14/2020  UEFS Score: 70  UEFS Disability Index: 1-19%  UEFS CMS Modifier: CI     Score currently:   UEFS Score: 68.75  UEFS Disability Index: 1-19%  UEFS CMS Modifier: CI                            Frequency/Duration: Time Frame for Long term goals : 2 x/week for 12 weeks     Rehab Potential: [] Excellent [x] Good     [] Fair [] Poor      Patient Status: [x] Continue/Initate plan of Care   []  Discharge   []  Additional visits requested, please re-certify for additional visits        Electronically signed by: MEHRDAD Lloyd 2/4/2021 3:37 PM    If you have any questions or concerns, please don't hesitate to call.   Thank you for your referral.

## 2021-02-04 NOTE — PROGRESS NOTES
Occupational Therapy  Daily Note     Name: Shelley Rutherford  : 1945  MRN: [id number]  Diagnosis:    L intracranial hemorrhage, R weakness, R neglect, R vision impairment    Visit Information:   Onset Date: 20  OT Insurance Information: Medicare  Total # of Visits Approved: (BMN)  Total # of Visits to Date: 30  Progress Note Due Date: 21  Progress Note Counter: 5/10-12    Date: 2021  OT Therapeutic activities 60 minutes for 4 unit(s), CPT 20996     OT Individual Minutes  Time In: 9314  Time Out: 3912  Minutes: 60    Referring Practitioner: Dr. Marge Mariano MD    Subjective:  Pt seen after ST. Pain rating:   Pre-treatment pain:    Pt denies pain    Action for pain:   No action necessary. Pain after treatment:      Pt denies pain         Focus of treatment was on the following:   assess for progress toward goals     Objective:    Treatment Activity:    Discussed previous HEP: yes, Pt verbally confirmed compliant with HEP's . Pt able to draw clock face with better time, pt able to self correct mistakes. Pt stated does not recall if using online clock at home for practice.       & Pinch Strength  Average of 3 tries Right eval Norm 21    (lb) 18.3 Female age 76-69: 46 lbs  26.6   Arvizu Pinch (lb) 6.67 Female age 76-69: 9.5 lbs  8.3   Lateral Pinch (lb) NT Female age 76-69: 11.0 lbs  11   Comments: lateral pinch NT d/t time constraints on eval.       Coordination & Dexterity    Right   eval 12/10/20 1/7/21  1/14/21 2/4/21 Norm   Nine Hole Peg Test  (seconds) 44.3 36.9 35.16 34.55 30.42 Female age 70-74: 20.2 s    Box & Blocks  (# of blocks) 34 13 32 NT 39 Female age 70-74: 68.6   Comments: : pt at times crossed barrier during box and blocks, 1 miss not counted.        The Upper Extremity Functional Index  The Upper Extremity Functional Scale  Any of your usual work, housework, or school activities: A Little Bit of Difficulty(pt stated visual impairment make it a little difficult to complete)  Your usual hobbies, recreational, or sporting activities: Moderate Difficulty  Lifting a bag of groceries to waist level: A Little Bit of Difficulty  Lifting a bag of groceries above your head: Quite a Bit of Difficulty  Grooming your hair: A Little Bit of Difficulty  Pushing up on your hands (eg from bathtub/chair): No Difficulty  Preparing food (eg peeling, cutting): Quite a Bit of Difficulty(pt stated has burned and cut self, and  now does not have pt complete.)  Driving: Extreme Difficulty or Unable to Perform Activity(n/a)  Vacuuming, sweeping, or raking: No Difficulty  Dressing: A Little Bit of Difficulty(with donning bra)  Doing up buttons: No Difficulty  Using tools or appliances: Moderate Difficulty(Pt stated \"doable\")  Opening doors: No Difficulty  Cleaning: A Little Bit of Difficulty  Tying or lacing shoes: No Difficulty  Sleeping: No Difficulty  Laundering clothes (eg washing, ironing, folding) : No Difficulty  Opening a jar: Extreme Difficulty or Unable to Perform Activity  Throwing a ball: Moderate Difficulty  Carrying a small suitcase with your affected limb: No Difficulty(pt stated not sure, stated carries small bags in RUE with no difficulty)  UEFS Score: 68.75  UEFS Disability Index: 1-19%  UEFS CMS Modifier: CI    Comments: Recommended using mirrors to place on L side of visual field and have another mirror angled to right side to increased ability to comb hair with least amount of difficulty. Discussed use of left hand to turn bra in back and right hand on left side to help pull bra around after snapping in front. Discussed using modified cutting board to assist with meal prep and increase safety with meal prep. Provided written handout of recommendations, pt to attempt at home. Assessment:   Pt tolerated treatment well. Pt making progress towards goals.      Plan:   Continue POC    Goals:     Long term goals  Time Frame for Long term goals : 2 x/week for 12 weeks  Long term goal 1: Patient will be Supervised with all recommended HEP's, adaptive strategies, and adaptive techniques. Long term goal 2: Patient will increase R  strength from current by 10 lbs to increase performance with I/ADL's. Long term goal 3: Patient will increase R pinch strength from current by 5 lbs to increase performance with I/ADL's. Long term goal 4: Patient will increase dexterity in R hand as observed by 9 hole peg test by completing WFL to increase performance with I/ADL's. Long term goal 5: Patient will increase RUE coordination as observed by box and blocks by completing WFL to increase performance with I/ADL's. Long term goals 6: Patient will improve  RUE sensation and/or utilize compensatory techniques for safe completion of self-care as projected. Long term goal 7: Patient will scan environment to the right side without verbal cues to increase safety and performance with functional activities. Long term goal 8: Pt will complete clock drawing test for further assessment of visual perceptual skills. Long term goal 9: Pt will be IND using adaptive utensils to increase self-feeding skills.     LIAM Nunez/L   2/4/2021  3:22 PM

## 2021-02-09 ENCOUNTER — HOSPITAL ENCOUNTER (OUTPATIENT)
Dept: OCCUPATIONAL THERAPY | Age: 76
Setting detail: THERAPIES SERIES
Discharge: HOME | End: 2021-02-09
Payer: MEDICARE

## 2021-02-09 ENCOUNTER — HOSPITAL ENCOUNTER (OUTPATIENT)
Dept: SPEECH THERAPY | Age: 76
Setting detail: THERAPIES SERIES
Discharge: HOME | End: 2021-02-09
Payer: MEDICARE

## 2021-02-09 PROCEDURE — 92507 TX SP LANG VOICE COMM INDIV: CPT

## 2021-02-09 PROCEDURE — 97530 THERAPEUTIC ACTIVITIES: CPT

## 2021-02-09 NOTE — PROGRESS NOTES
[id number] [facility]  Speech Language Pathology  Adult Daily Note    Nando Bansal  : 1945    Date: 2021    Visit Information:  SLP Insurance Information: Medicare  Total # of Visits Approved: 35  Total # of Visits to Date: 10  No Show: 0  Canceled Appointment: 0        Plan of care signed (Y/N):     Yes    Certification Period: 21-21  Plan of Care Visit # 10      Interventions used this date:  Expressive Language, Receptive Language and Instruction in Compensatory Strategies    Subjective: pt reported that she did some functional writing of thank you notes. Alert and Cooperative       Objective/Assessment:   Patient progressing towards goals:  1. To increase safety awareness, judgment and complex verbal expression, pt will complete abstract reasoning tasks (i.e. Word deduction, convergent and divergent naming, similarities/differences) with 90% accuracy and min cues. Homework written category task 87% acc Independently  2. Pt will complete sound-letter correspondence with phonological treatment program (PTP) with 90% acc Ind Goal met for consonants, move to vowels  Vowels set 5  Writing vowel combinations: 50% acc Independently and 67% acc min cues  Writing key words: 3/13 Independently and increased to 9/13 with min-mod cues  3. Pt will complete letter-sound correspondence with phonological treatment program (PTP) with 90% acc Ind Goal met for consonants, move to vowels  Not assessed   4. Pt will increase words per minute (WPM) during Multiple Oral Re-reading (MOR) from 7.2 WPM to 10 WPM with standby cues to increase patient functional reading   Passage 4 \"Who is Chesterfield Sorrel? \" pt read 30.5 WPM with Ind cues  Started new passage 5 \"No Dogs allowed\" with baseline of 13.7 WPM  5.  Pt will increase word reading accuracy during Multiple Oral Re-reading (MOR) to 80% acc with standby cues to increase patients functional reading    Passage 4 pt had 97% acc Independently   New passage 5 initiated with 89% acc Independently         Pain Assessment:  Initial Assessment:  Patient denies pain. Re-assessment:  Patient denies pain. Plan:  Continue with current goals    Patient/Caregiver Education:  Patient/Caregiver educated on session.   Patient/Caregiver provided with home program: New passage 5    Time in: 1400  Time out: [address]  Minutes seen: 39    Signature: Electronically signed by ARRON Caicedo on 2/9/2021 at 2:58 PM

## 2021-02-09 NOTE — PROGRESS NOTES
[x][facility]    [][facility] [address] [place] [facility]     [address] [place], [address] [place], [address]      Phone: [phone]     Phone: [phone]      Fax: [phone]     Fax: [phone]    ______________________________________________________________________                [facility]  Speech Language Pathology  Adult Progress Note                          Physician: Dr. Rodrigo Mcguire MD  From: Ilan Marley MA,CCC-SLP   Patient: Nava Padgett       : 1945  Diagnosis: Aphasia     Date: 2021  Treatment Diagnosis: R47.1   Date of Evaluation: 9/10/20      Plan of Care/Treatment to date: Expressive Language Therapy, Receptive Language Therapy and Instruction in Compensatory Strategies    Date range from 21 to 21. Subjective: Pt has attended all scheduled therapy sessions in the treatment period. Pt reports completing HEP assigned and is positive and cooperative during therapy. Pt starting to show some carryover of compensatory reading strategies. Progress toward Short-Term Goals:  1. To increase safety awareness, judgment and complex verbal expression, pt will complete abstract reasoning tasks (i.e. Word deduction, convergent and divergent naming, similarities/differences) with 90% accuracy and min cues. Goal met for verbal tasks, change to written tasks  2. Pt will complete sound-letter correspondence with phonological treatment program (PTP) with 90% acc Ind Goal met for consonants, moved to vowels  3. Pt will complete letter-sound correspondence with phonological treatment program (PTP) with 90% acc Ind Goal met for consonants, moved to vowels  4. Pt will increase words per minute (WPM) during Multiple Oral Re-reading (MOR) from 7.2 WPM to 10 WPM with standby cues to increase patient functional reading  New passage 4 \"Who is Breana Rodriguez? \" pt read 28.6 WPM with Ind cues  5. Pt will increase word reading accuracy during Multiple Oral Re-reading (MOR) to 80% acc with standby cues to increase patients functional reading     Updated Short-Term Goals:  Update goals 1-5  1. To increase safety awareness, judgment and complex expression, pt will complete written abstract reasoning tasks (i.e. Word deduction, convergent and divergent naming, similarities/differences) with 90% accuracy and min cues. 2. Pt will complete sound-letter correspondence with phonological treatment program (PTP) with 90% acc Independently with vowels   3. Pt will complete letter-sound correspondence with phonological treatment program (PTP) with 90% acc Independently with vowels   4. Pt will increase words per minute (WPM) during Multiple Oral Re-reading (MOR) passages to ~ 50 WPM with standby cues to increase patient functional reading  5. Pt will increase word reading accuracy during Multiple Oral Re-reading (MOR)sages to 80% acc with standby cues to increase patients functional reading   Add goal:  6. Pt will complete simple-mod complex functional reading tasks with 80% acc min assist     Long-Term Goals:   1. Pt will improve her expressive language abilities to a sentence/conversation level for effective communication with familiar and unfamiliar communication partners so they may functionally communicate and express complex ideas. 2. Pt will improve her reading comprehension abilities to a phrase level for effective comprehension of functional materials. Frequency/Duration of Treatment:   Days: 2 days/week  Length of Session:  45 minutes and 60 minutes  Weeks: 12 Weeks    Rehab Potential: Good    Prognostic Factors:   Motivation  Family/community support  Age  Participation level       Goal Status: Partially Achieved      Patient Status: Continue per initial Plan of Care    Discharge Plan: home          This patients condition is expected to improve within the treatment timeframe. MODIFIED WELCH FALL RISK ASSESSMENT:    History of Falling (has patient fallen in the past 30 days?):    No (0 points)    Secondary Diagnosis (is there more than 1 medical diagnosis in patients medical history?):    Yes (15 points)    Ambulatory Aid:    No device is used (0 points)    Gait:    Normal/bedrest/wheelchair (0 points)    Mental Status:    Oriented to own ability (0 points)      Total points = 15    Fall Risk Level: low    0 - 24: Low Risk - implement low risk fall prevention interventions    25 - 44: Medium risk  45 and higher: High Risk    JASMYN NOMS: N/A      Electronically signed by:  Electronically signed by ARRON Riggs on 2/9/2021 at 12:55 PM       If you have any questions or concerns, please don't hesitate to call.   Thank you for your referral.      Physician Signature:________________________________Date:__________________  By signing above, therapists plan is approved by physician

## 2021-02-09 NOTE — PROGRESS NOTES
Occupational Therapy  Daily Note     Name: Linda Hickman  : 1945  MRN: [id number]  Diagnosis:    L intracranial hemorrhage, R weakness, R neglect, R vision impairment    Visit Information:   Onset Date: 20  OT Insurance Information: Medicare  Total # of Visits Approved: (BMN)  Total # of Visits to Date: 31  Progress Note Due Date: 21  Progress Note Counter: 6/10-12    Date: 2021  OT Therapeutic activities 55 minutes for 4 unit(s), CPT 49009       OT Individual Minutes  Time In: 1300  Time Out: 5431  Minutes: 54    Referring Practitioner: Dr. Brett Blanton MD    Subjective:  \"I still have a hard time with numbers. \"      Pain rating:   Pre-treatment pain:    Pt denies pain    Action for pain:   No action necessary. Pain after treatment:      Pt denies pain         Focus of treatment was on the following:   coordination, fine motor/dexterity and strengthening  right      Objective:    Treatment Activity:     Pt placed pegs on 10x10  board crossing midline. Container placed on R side to facilitate scanning to R side. Pt with 3 VC's to initiate task. Pt then used 4 lb resistive clip to retrieve small beads and place on top of pegs. Pt with Min difficulty retrieving beads using clip, would drop beads at times. Pt with Min difficulty aligning beads on R side and using tactile cues from pegs to place bead in center of peg. Pt with good pace with this technique. Pt with small rest breaks d/t hand fatigue. Pt then removed all beads with pincer grasp with good pace. Pt missed container x 2. Pt often knocking beads off from top of pegs. Pt then removed pegs with R hand 4 at a time. Pt with Min difficulty. Pt then used adjustable hand gripper at 20 lbs. Pt completed 10 reps and then 10 reps holding for 10 seconds. Pt with Mod difficulty keeping handle secure, hand with tremor and increased gap between handles.        Discussed previous HEP: yes, Pt verbally confirmed compliant with HEP's Assessment:   Pt tolerated treatment fair. Pt with decreased endurance with R hand during strengthening activity. Pt using compensatory techniques to scan to right side and using tactile cues during activity to complete task. Plan:   Continue POC    Goals:     Long term goals  Time Frame for Long term goals : 2 x/week for 12 weeks  Long term goal 1: Patient will be Supervised with all recommended HEP's, adaptive strategies, and adaptive techniques. Long term goal 2: Patient will increase R  strength from current by 10 lbs to increase performance with I/ADL's. Long term goal 3: Patient will increase R pinch strength from current by 5 lbs to increase performance with I/ADL's. Long term goal 4: Patient will increase dexterity in R hand as observed by 9 hole peg test by completing WFL to increase performance with I/ADL's. Long term goal 5: Patient will increase RUE coordination as observed by box and blocks by completing WFL to increase performance with I/ADL's. Long term goals 6: Patient will improve  RUE sensation and/or utilize compensatory techniques for safe completion of self-care as projected. Long term goal 7: Patient will scan environment to the right side without verbal cues to increase safety and performance with functional activities. Long term goal 8: Pt will complete clock drawing test for further assessment of visual perceptual skills. Long term goal 9: Pt will be IND using adaptive utensils to increase self-feeding skills.     Arlene Pat OTR/L   2/9/2021  2:18 PM

## 2021-02-11 ENCOUNTER — HOSPITAL ENCOUNTER (OUTPATIENT)
Dept: SPEECH THERAPY | Age: 76
Setting detail: THERAPIES SERIES
Discharge: HOME | End: 2021-02-11
Payer: MEDICARE

## 2021-02-11 ENCOUNTER — HOSPITAL ENCOUNTER (OUTPATIENT)
Dept: OCCUPATIONAL THERAPY | Age: 76
Setting detail: THERAPIES SERIES
Discharge: HOME | End: 2021-02-11
Payer: MEDICARE

## 2021-02-11 PROCEDURE — 97530 THERAPEUTIC ACTIVITIES: CPT

## 2021-02-11 PROCEDURE — 92507 TX SP LANG VOICE COMM INDIV: CPT

## 2021-02-11 NOTE — PROGRESS NOTES
[id number] [facility] Outpatient  Speech Language Pathology  Adult Daily Note    Juan Antonio Campbell  : 1945    Date: 2021    Visit Information:             SLP Insurance Information: Medicare  Total # of Visits Approved: 35  Total # of Visits to Date: 11  No Show: 0  Canceled Appointment: 0       Plan of care signed (Y/N):     Yes    Certification Period: 21-3/9/21  Plan of Care Visit # 11      Interventions used this date:  Expressive Language, Receptive Language and Instruction in Compensatory Strategies    Subjective: Reported having a difficult time with homework practice for the new passage. Is continuing to read readers digest passages. Alert and [employer]       Objective/Assessment:   Patient progressing towards goals:  1. To increase safety awareness, judgment and complex expression, pt will complete written abstract reasoning tasks (i.e. Word deduction, convergent and divergent naming, similarities/differences) with 90% accuracy and min cues. Completed convergent naming task verbally 90% acc Independently and written answers 80% acc with min cues  Abstract divergent naming task written with 80% acc min cues   2. Pt will complete sound-letter correspondence with phonological treatment program (PTP) with 90% acc Independently with vowels   Not addressed   3. Pt will complete letter-sound correspondence with phonological treatment program (PTP) with 90% acc Independently with vowels   Not assessed   4. Pt will increase words per minute (WPM) during Multiple Oral Re-reading (MOR) passages to ~ 50 WPM with standby cues to increase patient functional reading   Passage 5 \"No Dogs allowed\" with baseline of 14.2 WPM  5. Pt will increase word reading accuracy during Multiple Oral Re-reading (MOR) passages to 80% acc with standby cues to increase patients functional reading   Passage 5 with 90% acc Independently   Answered comprehension questions about passage with 75% acc Ind  6.  Pt will complete simple-mod complex functional reading tasks with 80% acc min assist       Pain Assessment:  Initial Assessment:  Patient denies pain. Re-assessment:  Patient denies pain. Plan:  Continue with current goals    Patient/Caregiver Education:  Patient/Caregiver educated on session.   Patient/Caregiver provided with home program: New passage 5, written convergent naming task, encouraged patient to try comprehension questions after she reads her readers digest articles    Time in: 1300  Time out: [address] [facility]  Minutes seen: 55    Signature: Electronically signed by ARRON Ansari on 2/11/2021 at 2:51 PM

## 2021-02-11 NOTE — PROGRESS NOTES
Occupational Therapy  Daily Note     Name: Collin Robles  : 1945  MRN: [id number]  Diagnosis:    L intracranial hemorrhage, R weakness, R neglect, R vision impairment    Visit Information:   Onset Date: 20  OT Insurance Information: Medicare  Total # of Visits to Date: 32  Progress Note Due Date: 21  Progress Note Counter: 7/10-12    Date: 2021  OT Therapeutic activities 54 minutes for 4 unit(s), CPT 35777     OT Individual Minutes  Time In: 1406  Time Out: 1500  Minutes: 47    Referring Practitioner: Dr. Chiki Roberts MD    Subjective:  \"I think it (reading clock) is sticking a little more. \" Pt stated moving from reading an analog clock in room and looking at digital clock in room will throw her off. Pt stated thinks she has more time based on what clock she looks at. Recommended using wrist watch to keep one set (analog or digital), and use as main source for time tracking if continued difficulty keeping track looking at various clocks. Session started 6 min late d/t therapist working over with previous pt. Pain rating:   Pre-treatment pain:    Pt denies pain    Action for pain:   No action necessary. Pain after treatment:      Pt denies pain, Stated R hand a little sore. Focus of treatment was on the following:   strengthening  right  and scanning to R side, and visual perceptual skills      Objective:    Treatment Activity:     Numbers 1-12 written on paper squares. On back, multiples of 5. Verbally stated time and pt to make time using numbers. Pt with increased time to complete, but did not require verbal cues to scan to R side. Pt with increased time to flip over numbers. Therapist stated eight [de-identified] and pt stated 8:00 with increased time. Pt wanted to keep paper squares to use at home. Education/HEP: hand strengthening: Patient issued blue theraputty.  Patient completed 10 reps of thumb flexion, power grasp, palmar pinch, tip pinch, digit abduction, wrist flexion/extension, radial/ulnar deviation. Educated to use theraputty on hard surfaces only and avoid clothing, hair, pets, or other items/areas that theraputty could become stuck in. Pt reported slight pain in R thumb and thenar area with putty, but nothing sharp. Pt stated able to continue using blue theraputty. Discussed previous HEP: yes, Pt verbally confirmed compliant with HEP's    Comments: \"Reading and writing is still the worst thing. \"  Pt stated hears \"clicks\" sometimes when doing leg raises in B hips. Pt stated no pain. Pt stated not the same click from when cranial piece placed back in. Assessment:   Pt tolerated treatment well. . Pt with increased time to complete task, but no verbal cues to scan to right side on this date. Pt reported some soreness using blue theraputty, but reported able to complete. Plan:   Continue POC    Goals:     Long term goals  Time Frame for Long term goals : 2 x/week for 12 weeks  Long term goal 1: Patient will be Supervised with all recommended HEP's, adaptive strategies, and adaptive techniques. Long term goal 2: Patient will increase R  strength from current by 10 lbs to increase performance with I/ADL's. Long term goal 3: Patient will increase R pinch strength from current by 5 lbs to increase performance with I/ADL's. Long term goal 4: Patient will increase dexterity in R hand as observed by 9 hole peg test by completing WFL to increase performance with I/ADL's. Long term goal 5: Patient will increase RUE coordination as observed by box and blocks by completing WFL to increase performance with I/ADL's. Long term goals 6: Patient will improve  RUE sensation and/or utilize compensatory techniques for safe completion of self-care as projected. Long term goal 7: Patient will scan environment to the right side without verbal cues to increase safety and performance with functional activities.   Long term goal 8: Pt will complete clock drawing test for further assessment of visual perceptual skills. Long term goal 9: Pt will be IND using adaptive utensils to increase self-feeding skills.     Bruna Wright OTR/L   2/11/2021  3:16 PM

## 2021-02-16 ENCOUNTER — HOSPITAL ENCOUNTER (OUTPATIENT)
Dept: OCCUPATIONAL THERAPY | Age: 76
Setting detail: THERAPIES SERIES
Discharge: HOME | End: 2021-02-16
Payer: MEDICARE

## 2021-02-16 ENCOUNTER — HOSPITAL ENCOUNTER (OUTPATIENT)
Dept: SPEECH THERAPY | Age: 76
Setting detail: THERAPIES SERIES
Discharge: HOME | End: 2021-02-16
Payer: MEDICARE

## 2021-02-16 PROCEDURE — 92507 TX SP LANG VOICE COMM INDIV: CPT

## 2021-02-16 PROCEDURE — 97530 THERAPEUTIC ACTIVITIES: CPT

## 2021-02-16 NOTE — PROGRESS NOTES
[facility] Outpatient  Speech Language Pathology  Adult Daily Note    Ulices Richey  : 1945    Date: 2021    Visit Information:   SLP Insurance Information: Medicare  Total # of Visits Approved: 35  Total # of Visits to Date: 12  No Show: 0  Canceled Appointment: 0    Plan of care signed (Y/N):     Yes    Certification Period: 21-3/9/21  Plan of Care Visit # 12      Interventions used this date:  Expressive Language, Receptive Language and Instruction in Compensatory Strategies    Subjective: Pt reported that she had a good time with her family in town this weekend. Alert [employer]       Objective/Assessment:   Patient progressing towards goals:  1. To increase safety awareness, judgment and complex expression, pt will complete written abstract reasoning tasks (i.e. Word deduction, convergent and divergent naming, similarities/differences) with 90% accuracy and min cues. Completed verbal analogies with 61% acc Independently and increased to 76% acc with min-mod verbal cues. Pt had increased difficulty with ones that could have multiple answers. Completed verbal explanations of proverbs with 81% acc Independently  Completed written word deduction task with 80% acc generating word, 80% acc with writing/spelling and 73% acc reading single word clues   2. Pt will complete sound-letter correspondence with phonological treatment program (PTP) with 90% acc Independently with vowels   Not addressed   3. Pt will complete letter-sound correspondence with phonological treatment program (PTP) with 90% acc Independently with vowels   Not assessed   4. Pt will increase words per minute (WPM) during Multiple Oral Re-reading (MOR) passages to ~ 50 WPM with standby cues to increase patient functional reading   Passage 5 \"No Dogs allowed\" with baseline of 20.3 WPM  5.  Pt will increase word reading accuracy during Multiple Oral Re-reading (MOR) passages to 80% acc with standby cues to increase patients functional reading   Passage 5 with  91% acc Independently and increased to 94% acc with min verbal phonemic cues for first sound. Pt self corrected 2x during passage reading. 6. Pt will complete simple-mod complex functional reading tasks with 80% acc min assist       Pain Assessment:  Initial Assessment:  Patient denies pain. Re-assessment:  Patient denies pain. Plan:  Continue with current goals    Patient/Caregiver Education:  Patient/Caregiver educated on session. Patient/Caregiver provided with home program: written word deduction task.     Time in: 1400  Time out: [address]  Minutes seen: 39    Signature: Electronically signed by Lindsay Fregoso SLP on 2/16/2021 at 2:52 PM

## 2021-02-16 NOTE — PROGRESS NOTES
Occupational Therapy  Daily Note     Name: Carolyn Henson  : 1945  MRN: [id number]  Diagnosis:    L intracranial hemorrhage, R weakness, R neglect, R vision impairment    Visit Information:   Onset Date: 20  OT Insurance Information: Medicare  Total # of Visits to Date: 33  Progress Note Due Date: 21  Progress Note Counter: 8/10-12    Date: 2021  OT Therapeutic activities 57 minutes for 4 unit(s), CPT 58860     OT Individual Minutes  Time In: 1300  Time Out: 2344  Minutes: 62    Referring Practitioner: Dr. Italia Vincent MD      Subjective: Pt on time for OT session. Pt reported no changes overall at home during activities. Pain rating:   Pre-treatment pain:    Pt denies pain    Action for pain:   No action necessary. Pain after treatment:      Pt denies pain         Focus of treatment was on the following:   fine motor/dexterity and strengthening  right      Objective:    Treatment Activity:     Pt rolled blue theraputty into log. Pt then placed small plastic pegs into putty, starting by crossing midline and working horizontally to R side. Peg container placed on R side to facilitate scanning and attending to R side. Pt required no verbal cues after instruction to attend to R side. Pt with good pace pushing pegs into theraputty. Pt then retrieved using 6 lb resistive clip. Pt with Min difficulty retrieving pegs from putty. Pt missed placing pegs into container x 1. Discussed previous HEP: yes, Pt verbally confirmed compliant with HEP's    Comments: Discussed having 2 more visits left on POC and will assess for progress. Pt stated not really having any difficulties at home. Pt stated she used to be \"busy and on the go\", but has to learn to slow down. Pt asked if she would be able to regain full vision.  Educated on importance of utilizing safety and adaptive strategies to compensate and that vision (along with overall level of abilities) may or may not return as prior to stroke. Pt verbalized understanding. Assessment:   Pt tolerated treatment well. Pt with little difficulty with increased resistance in theraputty and clip. Started to discuss possible d/c.    Plan:   Continue POC. Pt making progress towards goals. Re-assess pt within next two visits and consider continuation or d/c from OT. Goals:     Long term goals  Time Frame for Long term goals : 2 x/week for 12 weeks  Long term goal 1: Patient will be Supervised with all recommended HEP's, adaptive strategies, and adaptive techniques. Long term goal 2: Patient will increase R  strength from current by 10 lbs to increase performance with I/ADL's. Long term goal 3: Patient will increase R pinch strength from current by 5 lbs to increase performance with I/ADL's. Long term goal 4: Patient will increase dexterity in R hand as observed by 9 hole peg test by completing WFL to increase performance with I/ADL's. Long term goal 5: Patient will increase RUE coordination as observed by box and blocks by completing WFL to increase performance with I/ADL's. Long term goals 6: Patient will improve  RUE sensation and/or utilize compensatory techniques for safe completion of self-care as projected. Long term goal 7: Patient will scan environment to the right side without verbal cues to increase safety and performance with functional activities. Long term goal 8: Pt will complete clock drawing test for further assessment of visual perceptual skills. Long term goal 9: Pt will be IND using adaptive utensils to increase self-feeding skills.     LIAM Montoya/L   2/16/2021  2:12 PM

## 2021-02-18 ENCOUNTER — HOSPITAL ENCOUNTER (OUTPATIENT)
Dept: OCCUPATIONAL THERAPY | Age: 76
Setting detail: THERAPIES SERIES
Discharge: HOME | End: 2021-02-18
Payer: MEDICARE

## 2021-02-18 ENCOUNTER — HOSPITAL ENCOUNTER (OUTPATIENT)
Dept: SPEECH THERAPY | Age: 76
Setting detail: THERAPIES SERIES
Discharge: HOME | End: 2021-02-18
Payer: MEDICARE

## 2021-02-18 PROCEDURE — 92507 TX SP LANG VOICE COMM INDIV: CPT

## 2021-02-18 PROCEDURE — 97530 THERAPEUTIC ACTIVITIES: CPT

## 2021-02-18 NOTE — PROGRESS NOTES
[facility] Outpatient  Speech Language Pathology  Adult Daily Note    Viridiana Shea  : 1945    Date: 2021    Visit Information:   SLP Insurance Information: Medicare  Total # of Visits Approved: 35  Total # of Visits to Date: 13  No Show: 0  Canceled Appointment: 0      Plan of care signed (Y/N):     Yes    Certification Period: 21-3/9/21  Plan of Care Visit # 13      Interventions used this date:  Expressive Language, Receptive Language and Instruction in Compensatory Strategies    Subjective: Pt reported completing written word deduction task for homework and required increased processing time at beginning of exercise  Alert and [employer]       Objective/Assessment:   Patient progressing towards goals:  1. To increase safety awareness, judgment and complex expression, pt will complete written abstract reasoning tasks (i.e. Word deduction, convergent and divergent naming, similarities/differences) with 90% accuracy and min cues. Completed verbal sentence analogies with 100% acc Independently and then verbal analogies with 90% acc min verbal cues   2. Pt will complete sound-letter correspondence with phonological treatment program (PTP) with 90% acc Independently with vowels   Set 5 vowels  Vowel ID: 66% acc Ind and increased to 100% acc min cues  Write key words: required standby- min verbal cues to elicit all key words  3. Pt will complete letter-sound correspondence with phonological treatment program (PTP) with 90% acc Independently with vowels   Not assessed   4. Pt will increase words per minute (WPM) during Multiple Oral Re-reading (MOR) passages to ~ 50 WPM with standby cues to increase patient functional reading  Not addressed   5. Pt will increase word reading accuracy during Multiple Oral Re-reading (MOR) passages to 80% acc with standby cues to increase patients functional reading   Not addressed  6.  Pt will complete simple-mod complex functional reading tasks with 80% acc min assist   Completed carryover paragraph reading task with 79%-86% acc Ind and 88%-92% acc with min verbal cues. Comprehension questions about paragraph were 80% acc Ind. Pain Assessment:  Initial Assessment:  Patient denies pain. Re-assessment:  Patient denies pain. Plan:  Continue with current goals    Patient/Caregiver Education:  Patient/Caregiver educated on session.   Patient/Caregiver provided with home program: paragraph reading task and written abstract divergent naming    Time in: 1400  Time out: [address]  Minutes seen: 45    Signature: Electronically signed by ARRON Marquis on 2/18/2021 at 3:04 PM

## 2021-02-18 NOTE — PROGRESS NOTES
Occupational Therapy  Daily Note     Name: Myrna Brito  : 1945  MRN: [id number]  Diagnosis:    L intracranial hemorrhage, R weakness, R neglect, R vision impairment    Visit Information:   Onset Date: 20  OT Insurance Information: Medicare  Total # of Visits to Date: 34  Progress Note Due Date: 21  Progress Note Counter: 9/10-12    Date: 2021  OT Therapeutic activities 56 minutes for 4 unit(s), CPT 69055       OT Individual Minutes  Time In: 1304  Time Out: 1400  Minutes: 64    Referring Practitioner: Dr. Roni Brush MD      Subjective:  Session started 4 min late when pt arrived. Pain rating:   Pre-treatment pain:    No SOS of pain     Action for pain:   No action necessary. Pain after treatment:      No SOS of pain          Focus of treatment was on the following:   visual/perceptual     Objective:    Treatment Activity:     Pt participated with [employer] The DOT game. Pt with Min difficulty aligning transparencies to match easy cards, increased time. Pt with Max difficulty with more complex cards. Pt with increased time before therapist graded activity. Provided 1 transparency solution and allowed pt time to solve for each of the 4 cards x 2 different cards. Pt at times closing one eye to look at cards. Discussed previous HEP: yes, Pt verbally confirmed compliant with HEP's    Assessment:   Pt tolerated treatment fair. Pt without cues for scanning technique, but having difficulty solving more complex puzzle. Pt would count dots (4 per transparency) and count on cards (up to 16). Plan:   Continue POC    Goals:     Long term goals  Time Frame for Long term goals : 2 x/week for 12 weeks  Long term goal 1: Patient will be Supervised with all recommended HEP's, adaptive strategies, and adaptive techniques. Long term goal 2: Patient will increase R  strength from current by 10 lbs to increase performance with I/ADL's.   Long term goal 3: Patient will increase R pinch strength from current by 5 lbs to increase performance with I/ADL's. Long term goal 4: Patient will increase dexterity in R hand as observed by 9 hole peg test by completing WFL to increase performance with I/ADL's. Long term goal 5: Patient will increase RUE coordination as observed by box and blocks by completing WFL to increase performance with I/ADL's. Long term goals 6: Patient will improve  RUE sensation and/or utilize compensatory techniques for safe completion of self-care as projected. Long term goal 7: Patient will scan environment to the right side without verbal cues to increase safety and performance with functional activities. Long term goal 8: Pt will complete clock drawing test for further assessment of visual perceptual skills. Long term goal 9: Pt will be IND using adaptive utensils to increase self-feeding skills.     LIAM Hernandez/L   2/18/2021  3:24 PM

## 2021-02-23 ENCOUNTER — HOSPITAL ENCOUNTER (OUTPATIENT)
Dept: SPEECH THERAPY | Age: 76
Setting detail: THERAPIES SERIES
Discharge: HOME | End: 2021-02-23
Payer: MEDICARE

## 2021-02-23 ENCOUNTER — HOSPITAL ENCOUNTER (OUTPATIENT)
Dept: OCCUPATIONAL THERAPY | Age: 76
Setting detail: THERAPIES SERIES
Discharge: HOME | End: 2021-02-23
Payer: MEDICARE

## 2021-02-23 PROCEDURE — 92507 TX SP LANG VOICE COMM INDIV: CPT

## 2021-02-23 PROCEDURE — 97530 THERAPEUTIC ACTIVITIES: CPT

## 2021-02-23 NOTE — PROGRESS NOTES
OCCUPATIONAL THERAPY DISCHARGE SUMMARY     [x] [address]:     [address] [place], [place] [address]Rahul Crane  Ph: [phone]   Fax: [phone] [] [facility]:  [place] [address] [address] [facility], [address]   Ph: [phone]   Fax: [phone]       Date: 2021    To:Referring Practitioner: Dr. Aron Sinclair MD            From: Carmelo Fragoso OTR/L  Patient: Adria Gallegos   : 1945  MRN: [id number]  Diagnosis:Diagnosis:    L intracranial hemorrhage, R weakness, R neglect, R vision impairment   Date of eval: 9/10/2020    Visit Information:   Onset Date: 20  OT Insurance Information: Medicare  Total # of Visits to Date: 35  Progress Note Due Date: 21  Progress Note Counter: 10/10-12                 Assessment:    Goals Current/Discharge status  Met   Long term goal 1: Patient will be Supervised with all recommended HEP's, adaptive strategies, and adaptive techniques. Pt able to verbalize or demo recommended HEP. Pt reported purchasing similar activities used during therapy for home use. [x] Met  [] Partially Met  [] Not Met   Long term goal 2: Patient will increase R  strength from current by 10 lbs to increase performance with I/ADL's. Eval: 18.3 lbs, current 32 lbs, norm 46 lbs [x] Met  [] Partially Met  [] Not Met   Long term goal 3: Patient will increase R pinch strength from current by 5 lbs to increase performance with I/ADL's. Lateral pinch: eval NT, current 9 lbs, norm 11 lbs. Palmar pinch: 6.67 eval, current same [] Met  [x] Partially Met  [] Not Met   Long term goal 4: Patient will increase dexterity in R hand as observed by 9 hole peg test by completing WFL to increase performance with I/ADL's.  Eval NT, current 27.98 seconds, norm 20.2 seconds  [x] Met  [] Partially Met  [] Not Met   Long term goal 5: Patient will increase RUE coordination as observed by box and blocks by completing WFL to increase performance with I/ADL's. Kayla NT, current 36 blocks, norm 68.6 blocks  [] Met  [x] Partially Met  [] Not Met   Long term goals 6: Patient will improve  RUE sensation and/or utilize compensatory techniques for safe completion of self-care as projected. Pt stated not having any numbness or tingling anywhere. [x] Met  [] Partially Met  [] Not Met   Long term goal 7: Patient will scan environment to the right side without verbal cues to increase safety and performance with functional activities. Pt able to scan to right side and use finger to follow items with fingers with little to no verbal cues. [] Met  [x] Partially Met  [] Not Met   Long term goal 8: Pt will complete clock drawing test for further assessment of visual perceptual skills. Pt able to draw clock with increased time. Pt with mild difficulty placing hour and minute hands (slightly off). [x] Met  [] Partially Met  [] Not Met   Pt will be IND using adaptive utensils to increase self-feeding skills. Pt stated no difficulty cutting food or feeding. [x] Met  [] Partially Met  [] Not Met     Functional assessment used: Upper Extremity Functional Index  Completed new functional assessment 1/14/2021 for Upper Extremity Functioning. Functional assessment used: Upper Extremity Functional Index  Score currently:   UEFS Score: 70  UEFS Disability Index: 1-19%  UEFS CMS Modifier: CI     Score at d/c:   The Upper Extremity Functional Index  The Upper Extremity Functional Scale  Any of your usual work, housework, or school activities: [address] of Difficulty  Your usual hobbies, recreational, or sporting activities: Moderate Difficulty  Lifting a bag of groceries to waist level: No Difficulty  Lifting a bag of groceries above your head: Quite a Bit of Difficulty  Grooming your hair: A Little Bit of Difficulty  Pushing up on your hands (eg from bathtub/chair): No Difficulty  Preparing food (eg peeling, cutting):  A Little Bit of Difficulty  Driving: Extreme Difficulty or Unable to Perform Activity  Vacuuming, sweeping, or raking: No Difficulty  Dressing: A Little Bit of Difficulty  Doing up buttons: No Difficulty  Using tools or appliances: A Little Bit of Difficulty  Opening doors: No Difficulty  Cleaning: No Difficulty  Tying or lacing shoes: No Difficulty  Sleeping: No Difficulty  Laundering clothes (eg washing, ironing, folding) : A Little Bit of Difficulty  Opening a jar: No Difficulty  Throwing a ball: A Little Bit of Difficulty  Carrying a small suitcase with your affected limb: No Difficulty  UEFS Score: 80  UEFS Disability Index: 0%  UEFS CMS Modifier: CH      Plan: D/C from OT    Thank you for referral of this patient. Electronically signed by:   MEHRDAD Butt 2/24/2021 11:42 AM

## 2021-02-23 NOTE — PROGRESS NOTES
[facility]  Speech Language Pathology  Adult Daily Note    Yvette Gilliam  : 1945    Date: 2021    Visit Information:  SLP Insurance Information: Medicare  Total # of Visits Approved: 35  Total # of Visits to Date: 14  No Show: 0  Canceled Appointment: 0      Plan of care signed (Y/N):     Yes    Certification Period: 21-3/9/21  Plan of Care Visit # 14      Interventions used this date:  Expressive Language, Receptive Language and Instruction in Compensatory Strategies    Subjective: Pt was d/c'd from OT this date. Alert and Cooperative       Objective/Assessment:   Patient progressing towards goals:  1. To increase safety awareness, judgment and complex expression, pt will complete written abstract reasoning tasks (i.e. Word deduction, convergent and divergent naming, similarities/differences) with 90% accuracy and min cues. Completed verbal word deductions with 100% acc with min verbal cues  2. Pt will complete sound-letter correspondence with phonological treatment program (PTP) with 90% acc Independently with vowels   Not assessed   3. Pt will complete letter-sound correspondence with phonological treatment program (PTP) with 90% acc Independently with vowels   Not assessed   4. Pt will increase words per minute (WPM) during Multiple Oral Re-reading (MOR) passages to ~ 50 WPM with standby cues to increase patient functional reading  Completed Passage 5 \"No Dogs Allowed\" with 19.2 WPM. Pt verbalized that she has been trying to read slower lately to give herself time to think about the words and scan. 5. Pt will increase word reading accuracy during Multiple Oral Re-reading (MOR) passages to 80% acc with standby cues to increase patients functional reading   Read Passage 5 with 97% acc min verbal cues  6.  Pt will complete simple-mod complex functional reading tasks with 80% acc min assist   Completed carryover paragraph reading task with reading of Passage 4 from MOR \"Who is Jayden\" with 98% acc min verbal cues. Started reading paragraph from [address] and required mod verbal phonemic cues. Pain Assessment:  Initial Assessment:  Patient denies pain. Re-assessment:  Patient denies pain. Plan:  Continue with current goals    Patient/Caregiver Education:  Patient/Caregiver educated on session.   Patient/Caregiver provided with home program: Leelee Henson [employer] kyle    Time in: 1400  Time out: [address]  Minutes seen: 39    Signature: Electronically signed by Rosina Chicas SLP on 2/23/2021 at 2:54 PM

## 2021-02-23 NOTE — PROGRESS NOTES
Occupational Therapy  Daily Note     Name: Victorina Martines  : 1945  MRN: [id number]  Diagnosis:    L intracranial hemorrhage, R weakness, R neglect, R vision impairment    Visit Information:   Onset Date: 20  OT Insurance Information: Medicare  Total # of Visits to Date: 35  Progress Note Due Date: 21  Progress Note Counter: 10/10-12    Date: 2021  OT Therapeutic activities 60 minutes for 4 unit(s), CPT 47854     OT Individual Minutes  Time In: 1300  Time Out: 1400  Minutes: 61    Referring Practitioner: Dr. Janice Angel MD      Subjective:  \"Should I be reading everyday. \"        Pain rating:   Pre-treatment pain:    Pt denies pain    Action for pain:   No action necessary. Pain after treatment:      Pt denies pain         Focus of treatment was on the following:   assess for progress toward goals     Objective:    Treatment Activity:      & Pinch Strength  Average of 3 tries Right eval Norm     (lb) 18.3 Female age 76-69: 46 lbs  28   Arvizu Pinch (lb) 6.67 Female age 76-69: 9.5 lbs  6.67   Lateral Pinch (lb) NT Female age 76-69: 11.0 lbs  9   Comments: lateral pinch NT d/t time constraints on eval.      Coordination & Dexterity    Right eval Norm    Nine Hole Peg Test  (seconds) NT Female age 76-69: 22.2 s  29.80   Box & Blocks  (# of blocks) NT Female age 76-69: 66.8 45   Comments: Pt unable to follow directions to complete 9 HPT on eval.     Pt stated not having any numbness or tingling anywhere. Pt filled in hour markers in [place] for clock. Pt with increased time. Pt able to self correct mistakes. Pt with difficulty placing hour and minute hands in center of clock. Pt nearly had hands in correct positions. Discussed if having any difficulty at home. Pt asked if she should continue reading and attempt writing. Educated pt on practicing or completing HEP a little bit per day, and have rest breaks.  20 min for visual motor activity, take a rest break then work on something else, reading. Pt verbalized understanding. Discussed d/c from OT. Pt with no questions. Assessment:   Pt R hand  appears to be at plateau. Pt increased dexterity in R hand to [facility]. Pt scanning and using compensatory strategies during assessment without verbal cues. Plan:   D/C from OP OT    Goals:     Long term goals  Time Frame for Long term goals : 2 x/week for 12 weeks  Long term goal 1: Patient will be Supervised with all recommended HEP's, adaptive strategies, and adaptive techniques. Long term goal 2: Patient will increase R  strength from current by 10 lbs to increase performance with I/ADL's. Long term goal 3: Patient will increase R pinch strength from current by 5 lbs to increase performance with I/ADL's. Long term goal 4: Patient will increase dexterity in R hand as observed by 9 hole peg test by completing WFL to increase performance with I/ADL's. Long term goal 5: Patient will increase RUE coordination as observed by box and blocks by completing WFL to increase performance with I/ADL's. Long term goals 6: Patient will improve  RUE sensation and/or utilize compensatory techniques for safe completion of self-care as projected. Long term goal 7: Patient will scan environment to the right side without verbal cues to increase safety and performance with functional activities. Long term goal 8: Pt will complete clock drawing test for further assessment of visual perceptual skills. Long term goal 9: Pt will be IND using adaptive utensils to increase self-feeding skills.     Lynda John OTR/L   2/23/2021  2:43 PM

## 2021-02-25 ENCOUNTER — HOSPITAL ENCOUNTER (OUTPATIENT)
Dept: SPEECH THERAPY | Age: 76
Setting detail: THERAPIES SERIES
Discharge: HOME | End: 2021-02-25
Payer: MEDICARE

## 2021-02-25 ENCOUNTER — APPOINTMENT (OUTPATIENT)
Dept: OCCUPATIONAL THERAPY | Age: 76
End: 2021-02-25
Payer: MEDICARE

## 2021-02-25 PROCEDURE — 92507 TX SP LANG VOICE COMM INDIV: CPT

## 2021-02-25 NOTE — PROGRESS NOTES
[facility]  Speech Language Pathology  Adult Daily Note    Nando Bansal  : 1945    Date: 2021    Visit Information:  SLP Insurance Information: Medicare  Total # of Visits Approved: 35  Total # of Visits to Date: 15  No Show: 0  Canceled Appointment: 0      Plan of care signed (Y/N):     Yes    Certification Period: 21-3/9/21  Plan of Care Visit # 15      Interventions used this date:  Expressive Language, Receptive Language and Instruction in Compensatory Strategies    Subjective: Downloaded [employer] kyle on iPad. Alert and Cooperative       Objective/Assessment:   Patient progressing towards goals:  1. To increase safety awareness, judgment and complex expression, pt will complete written abstract reasoning tasks (i.e. Word deduction, convergent and divergent naming, similarities/differences) with 90% accuracy and min cues. Completed reading sentence inconsistencies task with 100% acc recognizing and fixing sentence inconsistency and required min-mod verbal cues for oral reading. 2. Pt will complete sound-letter correspondence with phonological treatment program (PTP) with 90% acc Independently with vowels   Not assessed   3. Pt will complete letter-sound correspondence with phonological treatment program (PTP) with 90% acc Independently with vowels   Not assessed   4. Pt will increase words per minute (WPM) during Multiple Oral Re-reading (MOR) passages to ~ 50 WPM with standby cues to increase patient functional reading  Not assessed   5. Pt will increase word reading accuracy during Multiple Oral Re-reading (MOR) passages to 80% acc with standby cues to increase patients functional reading   Not assessed   6. Pt will complete simple-mod complex functional reading tasks with 80% acc min assist   Completed mod complex functional reading task reading a letter with 87% acc Independently and increased to 92% acc with mod verbal cues.      Pain Assessment:  Initial Assessment:  Patient denies pain. Re-assessment:  Patient denies pain. Plan:  Continue with current goals    Patient/Caregiver Education:  Patient/Caregiver educated on session.   Patient/Caregiver provided with home program: Word deduction task, abstract divergent naming    Time in: 1400  Time out: [address]  Minutes seen: 45    Signature: Electronically signed by ARRON Hernandez on 2/25/2021 at 3:01 PM

## 2021-03-02 ENCOUNTER — HOSPITAL ENCOUNTER (OUTPATIENT)
Dept: SPEECH THERAPY | Age: 76
Setting detail: THERAPIES SERIES
Discharge: HOME | End: 2021-03-02
Payer: MEDICARE

## 2021-03-02 ENCOUNTER — APPOINTMENT (OUTPATIENT)
Dept: OCCUPATIONAL THERAPY | Age: 76
End: 2021-03-02
Payer: MEDICARE

## 2021-03-02 PROCEDURE — 92507 TX SP LANG VOICE COMM INDIV: CPT

## 2021-03-02 NOTE — PROGRESS NOTES
[id number] [facility]  Speech Language Pathology  Adult Daily Note    Philippe Tripp  : 1945    Date: 3/2/2021    Visit Information:  SLP Insurance Information: Medicare  Total # of Visits Approved: 35  Total # of Visits to Date: 16  No Show: 0   Canceled Appointment: 0      Plan of care signed (Y/N):     Yes    Certification Period: 21-3/9/21  Plan of Care Visit # 16      Interventions used this date:  Expressive Language, Receptive Language and Instruction in Compensatory Strategies    Subjective: Pt reported being in a great mood from the wonderful weather and being able to go outside. SLP informed patient that she would be gone on Thursday 3/11 and covering therapist would be Karo Strong and patient verbalized understanding. Alert and Cooperative       Objective/Assessment:   Patient progressing towards goals:  1. To increase safety awareness, judgment and complex expression, pt will complete written abstract reasoning tasks (i.e. Word deduction, convergent and divergent naming, similarities/differences) with 90% accuracy and min cues. Pt completed abstract divergent naming task written with 90% acc with 2x spelling errors  2. Pt will complete sound-letter correspondence with phonological treatment program (PTP) with 90% acc Independently with vowels   Not assessed   3. Pt will complete letter-sound correspondence with phonological treatment program (PTP) with 90% acc Independently with vowels   Not assessed   4. Pt will increase words per minute (WPM) during Multiple Oral Re-reading (MOR) passages to ~ 50 WPM with standby cues to increase patient functional reading  Not assessed   5. Pt will increase word reading accuracy during Multiple Oral Re-reading (MOR) passages to 80% acc with standby cues to increase patients functional reading   Not assessed   6.  Pt will complete simple-mod complex functional reading tasks with 80% acc min assist   SLP began [employer] for  Reading and Spelling  Reading  Regular words 11/20, Irregular 11/20  High Frequency 13/20, Low Frequency 9/20  Regular High Frequency 5/10, Regular Low Frequency 6/10  Irregular High Frequency 8/10, Irregular Low Frequency 3/10    Writing  Regular words 18/20, Irregular words 15/20  High Frequency 16/20, Low Frequency 17/20  Regular High Frequency 8/10, Regular Low frequency 10/10  Irregular high frequency 8/10, Irregular low frequency 7/10    Completed confrontational writing task with 67% acc min cues. Pt had phonemic errors with writing (e.g. louse for house). Pain Assessment:  Initial Assessment:  Patient denies pain. Re-assessment:  Patient denies pain. Plan:  Continue with current goals    Patient/Caregiver Education:  Patient/Caregiver educated on session.   Patient/Caregiver provided with home program: confrontational naming 10 items   Time in: 1400  Time out: [address]   Minutes seen: 45    Signature: Electronically signed by ARRON Colon on 3/2/2021 at 4:17 PM

## 2021-03-04 ENCOUNTER — HOSPITAL ENCOUNTER (OUTPATIENT)
Dept: SPEECH THERAPY | Age: 76
Setting detail: THERAPIES SERIES
Discharge: HOME | End: 2021-03-04
Payer: MEDICARE

## 2021-03-04 ENCOUNTER — APPOINTMENT (OUTPATIENT)
Dept: OCCUPATIONAL THERAPY | Age: 76
End: 2021-03-04
Payer: MEDICARE

## 2021-03-04 PROCEDURE — 92507 TX SP LANG VOICE COMM INDIV: CPT

## 2021-03-04 NOTE — PROGRESS NOTES
Frequency 13/20, Low Frequency 9/20  Regular High Frequency 5/10, Regular Low Frequency 6/10  Irregular High Frequency 8/10, Irregular Low Frequency 3/10      Pain Assessment:  Initial Assessment:  Patient denies pain. Re-assessment:  Patient denies pain. Plan:  Continue with current goals    Patient/Caregiver Education:  Patient/Caregiver educated on session.   Patient/Caregiver provided with home program: 2 paragraph reading tasks with comprehension questions, provided reading lists from [place] battery   Time in: 1400  Time out: [address]  Minutes seen: 45    Signature: Electronically signed by Jacey Quarles, SLP on 3/4/2021 at 3:08 PM

## 2021-03-09 ENCOUNTER — APPOINTMENT (OUTPATIENT)
Dept: OCCUPATIONAL THERAPY | Age: 76
End: 2021-03-09
Payer: MEDICARE

## 2021-03-09 ENCOUNTER — HOSPITAL ENCOUNTER (OUTPATIENT)
Dept: SPEECH THERAPY | Age: 76
Setting detail: THERAPIES SERIES
Discharge: HOME | End: 2021-03-09
Payer: MEDICARE

## 2021-03-09 PROCEDURE — 92507 TX SP LANG VOICE COMM INDIV: CPT

## 2021-03-09 NOTE — PROGRESS NOTES
[x][facility]    [][facility] [address] [place] [facility]     [address] [place], [address] Andree [place]aga, [address]      Phone: [phone]     Phone: [phone]      Fax: [phone]     Fax: [phone]    ______________________________________________________________________                [facility] Out[facility]  Speech Language Pathology  Adult Progress Note                          Physician: Dr. Nadine Lindsay MD  From: Cristal Horowitz MA,CCC-SLP   Patient: Skye Armijo       : 1945  Diagnosis: Aphasia    Date: 3/9/2021  Treatment Diagnosis: R47.01  Date of Evaluation: 9/10/20      Plan of Care/Treatment to date: Expressive Language Therapy, Receptive Language Therapy and Instruction in Compensatory Strategies    Date range from 21 to 3/9/21. Subjective: Pt has attended all scheduled treatment sessions and has participated in all HEP. Pt is motivated to continue therapy. Pt has since been d/c'd from OT. Progress toward Short-Term Goals:  1. To increase safety awareness, judgment and complex expression, pt will complete written abstract reasoning tasks (i.e. Word deduction, convergent and divergent naming, similarities/differences) with 90% accuracy and min cues. Goal met for verbal expression, adjust goal to written  with decreased cueing  2. Pt will complete sound-letter correspondence with phonological treatment program (PTP) with 90% acc Independently with vowels   Progress made, set 5 vowels  3. Pt will complete letter-sound correspondence with phonological treatment program (PTP) with 90% acc Independently with vowels   Progress made, set 5 vowels   4. Pt will increase words per minute (WPM) during Multiple Oral Re-reading (MOR) passages to ~ [place] standby cues to increase patient functional reading  D/c goal for WPM  5.  Pt will increase word reading accuracy during Multiple Oral Re-reading (MOR) passages to 80% acc with standby cues to increase patients functional reading   Goal met, increase to 90% acc Independently over 3x sessions  6. Pt will complete simple-mod complex functional reading tasks with 80% acc min assist   Progress made    Updated Short-Term Goals:  1. To increase safety awareness, judgment and complex expression, pt will complete written abstract reasoning tasks (i.e. Word deduction, convergent and divergent naming, similarities/differences) with 90% accuracy and standby cues. 5. Pt will increase word reading accuracy during Multiple Oral Re-reading (MOR) passages to 90% acc with standby cues over 3x sessions to increase patients functional reading   Add goal:   7. Pt will complete functional phrase/sentence writing tasks with 80% acc standby cues    Long-Term Goals:   1. Pt will improve her expressive language abilities to a sentence/conversation level for effective communication with familiar and unfamiliar communication partners so they may functionally communicate and express complex ideas. 2. Pt will improve her reading comprehension abilities to a phrase level for effective comprehension of functional materials. Frequency/Duration of Treatment:   Days: 2 days/week  Length of Session:  45 minutes and 60 minutes  Weeks: 12 Weeks    Rehab Potential: Good    Prognostic Factors: Motivation  Family/community support  Participation level       Goal Status: Partially Achieved      Patient Status: Continue per initial Plan of Care    Discharge Plan: home          This patients condition is expected to improve within the treatment timeframe.     MODIFIED WELCH FALL RISK ASSESSMENT:    History of Falling (has patient fallen in the past 30 days?):    No (0 points)    Secondary Diagnosis (is there more than 1 medical diagnosis in patients medical history?):    Yes (15 points)    Ambulatory Aid:    No device is used (0 points)    Gait: Normal/bedrest/wheelchair (0 points)    Mental Status:    Oriented to own ability (0 points)      Total points = 15    Fall Risk Level: low    0 - 24: Low Risk - implement low risk fall prevention interventions    25 - 44: Medium risk  45 and higher: High Risk    JASMYN NOMS: N/A      Electronically signed by:  Electronically signed by ARRON Posada on 3/9/2021 at 2:57 PM       If you have any questions or concerns, please don't hesitate to call.   Thank you for your referral.      Physician Signature:________________________________Date:__________________  By signing above, therapists plan is approved by physician

## 2021-03-09 NOTE — PROGRESS NOTES
[id number] [facility] Out[facility]  Speech Language Pathology  Adult Daily Note    Anahy Gonzalez  : 1945    Date: 3/9/2021    Visit Information:  SLP Insurance Information: Medicare  Total # of Visits Approved: 35  Total # of Visits to Date: 18  No Show: 0   Canceled Appointment: 0      Plan of care signed (Y/N):     Yes    Certification Period: 21-3/9/21  Plan of Care Visit # 18  ** Updated visit tracker from last visit error    Interventions used this date:  Expressive Language, Receptive Language and Instruction in Compensatory Strategies    Subjective:  SLP informed patient that she would be gone on Thursday 3/11 and covering therapist would be Travis Marcum and patient verbalized understanding. Alert and Cooperative       Objective/Assessment:   Patient progressing towards goals:  1. To increase safety awareness, judgment and complex expression, pt will complete written abstract reasoning tasks (i.e. Word deduction, convergent and divergent naming, similarities/differences) with 90% accuracy and min cues. Not addressed   2. Pt will complete sound-letter correspondence with phonological treatment program (PTP) with 90% acc Independently with vowels   Completed Set 5 vowels with 50% acc with min-mod verbal cues to write vowels. Pt is showing decreased carryover of this task from session to session. 3. Pt will complete letter-sound correspondence with phonological treatment program (PTP) with 90% acc Independently with vowels   Not assessed   4. Pt will increase words per minute (WPM) during Multiple Oral Re-reading (MOR) passages to ~ 50 WPM with standby cues to increase patient functional reading  Not assessed   5. Pt will increase word reading accuracy during Multiple Oral Re-reading (MOR) passages to 80% acc with standby cues to increase patients functional reading   After one verbal reading from SLP introduced new Passage 6 \"Hester Crusoe\" with 88% acc Ind   6.  Pt will complete simple-mod complex functional reading tasks with 80% acc min assist   Pt reports after verbal model from family member patient was able to read [employer]. Then next day when patient attempted reading again the words looked \"all new\" to her. Completed confrontational writing task with 70% acc Independently     Pain Assessment:  Initial Assessment:  Patient denies pain. Re-assessment:  Patient denies pain. Plan:  Continue with current goals    Patient/Caregiver Education:  Patient/Caregiver educated on session.   Patient/Caregiver provided with home program: ROSIE Pittman 6  Time in: 1400  Time out: [address]  Minutes seen: 39    Signature: Electronically signed by Enrique Garcia SLP on 3/9/2021 at 2:54 PM

## 2021-03-11 ENCOUNTER — HOSPITAL ENCOUNTER (OUTPATIENT)
Dept: SPEECH THERAPY | Age: 76
Setting detail: THERAPIES SERIES
Discharge: HOME | End: 2021-03-11
Payer: MEDICARE

## 2021-03-11 ENCOUNTER — APPOINTMENT (OUTPATIENT)
Dept: OCCUPATIONAL THERAPY | Age: 76
End: 2021-03-11
Payer: MEDICARE

## 2021-03-11 PROCEDURE — 92507 TX SP LANG VOICE COMM INDIV: CPT

## 2021-03-11 NOTE — PROGRESS NOTES
[id number] [facility] Outpatient  Speech Language Pathology  Adult Daily Note    Yvette Gilliam  : 1945    Date: 3/11/2021    Visit Information:  SLP Insurance Information: Medicare  Total # of Visits Approved: 35  Total # of Visits to Date: 19  No Show: 0  Canceled Appointment: 0      Plan of care signed (Y/N):     Yes    Certification Period: 21-3/9/21  Plan of Care Visit # 19    Interventions used this date:  Expressive Language, Receptive Language and Instruction in Compensatory Strategies    Subjective:   Patient arrived on time to begin speech therapy session. Patient was pleasant and participated in all therapeutic activities. Patient stated that she will continue to work on reading homework for next session. Alert and Cooperative       Objective/Assessment:   Patient progressing towards goals:  1. To increase safety awareness, judgment and complex expression, pt will complete written abstract reasoning tasks (i.e. Word deduction, convergent and divergent naming, similarities/differences) with 90% accuracy and min cues. Divergent Namin% acc independently given extra time    2. Pt will complete sound-letter correspondence with phonological treatment program (PTP) with 90% acc Independently with vowels   Not addressed this date    3. Pt will complete letter-sound correspondence with phonological treatment program (PTP) with 90% acc Independently with vowels   Not assessed this date    4. Pt will increase words per minute (WPM) during Multiple Oral Re-reading (MOR) passages to ~ 50 WPM with standby cues to increase patient functional reading  Not assessed this date    5. Pt will increase word reading accuracy during Multiple Oral Re-reading (MOR) passages to 80% acc with standby cues to increase patients functional reading   [place] Passage: 91% accuracy independently  Patient stated that she was having increasing difficulty with \"th\" words this date.  After given two verbal cues, patient increased accuracy of \"th\" words during structured reading task. 6. Pt will complete simple-mod complex functional reading tasks with 80% acc min assist   Not addressed this date    Completed confrontational writing task with 86% acc independently this date, Pt wrote in cursive this date, Patient stated that she typically never writes in cursive. Cursive was easily read with few errors this date. Pain Assessment:  Initial Assessment:  Patient denies pain. Re-assessment:  Patient denies pain. Plan:  Continue with current goals    Patient/Caregiver Education:  Patient/Caregiver educated on session.   Patient/Caregiver provided with home program: MOR Passage 6  Time in: 1400  Time out: [address]  Minutes seen: 45 minutes    Signature: Electronically signed by ARRON Badillo on 3/11/2021 at 3:03 PM

## 2021-03-16 ENCOUNTER — APPOINTMENT (OUTPATIENT)
Dept: OCCUPATIONAL THERAPY | Age: 76
End: 2021-03-16
Payer: MEDICARE

## 2021-03-16 ENCOUNTER — HOSPITAL ENCOUNTER (OUTPATIENT)
Dept: SPEECH THERAPY | Age: 76
Setting detail: THERAPIES SERIES
Discharge: HOME | End: 2021-03-16
Payer: MEDICARE

## 2021-03-16 PROCEDURE — 92507 TX SP LANG VOICE COMM INDIV: CPT

## 2021-03-16 NOTE — PROGRESS NOTES
[facility] Outpatient  Speech Language Pathology  Adult Daily Note    Yvette Gilliam  : 1945    Date: 3/16/2021    Visit Information:          SLP Insurance Information: Medicare  Total # of Visits Approved: 35  Total # of Visits to Date: 20  No Show: 0  Canceled Appointment: 0            Plan of care signed (Y/N):     Yes    Certification Period: 3/9/21-21  Plan of Care Visit # 20    Interventions used this date:  Expressive Language, Receptive Language and Instruction in Compensatory Strategies    Subjective:   Pt was on time and was cooperative. Began discussion with patient and  about beginning in April going down to 1x/week to spread out 35 visits. Alert and Cooperative       Objective/Assessment:   Patient progressing towards goals: 1. To increase safety awareness, judgment and complex expression, pt will complete written abstract reasoning tasks (i.e. Word deduction, convergent and divergent naming, similarities/differences) with 90% accuracy and standby cues. Not addressed     2. Pt will complete sound-letter correspondence with phonological treatment program (PTP) with 90% acc Independently with vowels   Not addressed this date    3. Pt will complete letter-sound correspondence with phonological treatment program (PTP) with 90% acc Independently with vowels   Not assessed this date    4. Pt will increase word reading accuracy during Multiple Oral Re-reading (MOR) passages to 90% acc with standby cues over 3x sessions to increase patients functional reading   Not addressed     6. Pt will complete simple-mod complex functional reading tasks with 80% acc min assist   Pt read moderate level paragraph with 91% acc Independently and answered comprehension questions with 100% acc min cues. SLP summarized paragraph correction through to aid comprehension and recall.      7. Pt will complete functional phrase/sentence writing tasks with 80% acc standby cues  Pt wrote sentences given 2 words with 100% acc spelling     Pain Assessment:  Initial Assessment:  Patient denies pain. Re-assessment:  Patient denies pain. Plan:  Continue with current goals    Patient/Caregiver Education:  Patient/Caregiver educated on session.   Patient/Caregiver provided with home program: moderate level paragraph  Time in: 1400  Time out: 1445  Minutes seen: 45 minutes    Signature: Electronically signed by ARRON Marquis on 3/16/2021 at 2:55 PM

## 2021-03-18 ENCOUNTER — HOSPITAL ENCOUNTER (OUTPATIENT)
Dept: SPEECH THERAPY | Age: 76
Setting detail: THERAPIES SERIES
Discharge: HOME | End: 2021-03-18
Payer: MEDICARE

## 2021-03-18 ENCOUNTER — APPOINTMENT (OUTPATIENT)
Dept: OCCUPATIONAL THERAPY | Age: 76
End: 2021-03-18
Payer: MEDICARE

## 2021-03-18 PROCEDURE — 92507 TX SP LANG VOICE COMM INDIV: CPT

## 2021-03-18 NOTE — PROGRESS NOTES
[facility] Outpatient  Speech Language Pathology  Adult Daily Note    Carolyn Sixto  : 1945    Date: 3/18/2021    Visit Information:  SLP Insurance Information: Medicare  Total # of Visits Approved: 35  Total # of Visits to Date: 21  No Show: 0  Canceled Appointment: 0        Plan of care signed (Y/N):     Yes    Certification Period: 3/9/21-21  Plan of Care Visit # 21    Interventions used this date:  Expressive Language, Receptive Language and Instruction in Compensatory Strategies    Subjective: Pt was on time and cooperative with all exercises. Alert and Cooperative       Objective/Assessment:   Patient progressing towards goals: 1. To increase safety awareness, judgment and complex expression, pt will complete written abstract reasoning tasks (i.e. Word deduction, convergent and divergent naming, similarities/differences) with 90% accuracy and standby cues. Pt completed Which one doesn't belong task with 100% acc choosing word and 68% acc reading the words    2. Pt will complete sound-letter correspondence with phonological treatment program (PTP) with 90% acc Independently with vowels   Pt completed PTP vowels set 5 with 50% acc     3. Pt will complete letter-sound correspondence with phonological treatment program (PTP) with 90% acc Independently with vowels   Not assessed this date    4. Pt will increase word reading accuracy during Multiple Oral Re-reading (MOR) passages to 90% acc with standby cues over 3x sessions to increase patients functional reading   Pt read MOR Passage [address] [address], [facility] with 90% acc Independently    6. Pt will complete simple-mod complex functional reading tasks with 80% acc min assist   Not addressed      7. Pt will complete functional phrase/sentence writing tasks with 80% acc standby cues  Not addressed     Pain Assessment:  Initial Assessment:  Patient denies pain. Re-assessment:  Patient denies pain.     Plan:  Continue with current goals    Patient/Caregiver Education:  Patient/Caregiver educated on session.   Patient/Caregiver provided with home program: reading list of prepositions/pronouns/conjunctions; abstract divergent naming  Time in: 1400  Time out: 1445  Minutes seen: 45 minutes    Signature: Electronically signed by ARRON Mena on 3/18/2021 at 3:00 PM

## 2021-03-23 ENCOUNTER — HOSPITAL ENCOUNTER (OUTPATIENT)
Dept: SPEECH THERAPY | Age: 76
Setting detail: THERAPIES SERIES
Discharge: HOME | End: 2021-03-23
Payer: MEDICARE

## 2021-03-23 ENCOUNTER — APPOINTMENT (OUTPATIENT)
Dept: OCCUPATIONAL THERAPY | Age: 76
End: 2021-03-23
Payer: MEDICARE

## 2021-03-23 PROCEDURE — 92507 TX SP LANG VOICE COMM INDIV: CPT

## 2021-03-23 NOTE — PROGRESS NOTES
[facility] Outpatient  Speech Language Pathology  Adult Daily Note    Malika Cole  : 1945    Date: 3/23/2021    Visit Information:  SLP Insurance Information: Medicare  Total # of Visits Approved: 35  Total # of Visits to Date: 22  No Show: 0  Canceled Appointment: 0        Plan of care signed (Y/N):     Yes    Certification Period: 3/9/21-21  Plan of Care Visit # 22    Interventions used this date:  Expressive Language, Receptive Language and Instruction in Compensatory Strategies    Subjective: Pt was on time and cooperative with all exercises. Pt stated that she went out to a restaurant last Friday and today for lunch. Alert and Cooperative       Objective/Assessment:   Patient progressing towards goals: 1. To increase safety awareness, judgment and complex expression, pt will complete written abstract reasoning tasks (i.e. Word deduction, convergent and divergent naming, similarities/differences) with 90% accuracy and standby cues. Pt completed convergent naming task reading the lists with 84% acc Ind and named category with 90% acc  Completed divergent naming task with 87% acc spelling category members and had 1 perseveration    2. Pt will complete sound-letter correspondence with phonological treatment program (PTP) with 90% acc Independently with vowels   Not addressed     3. Pt will complete letter-sound correspondence with phonological treatment program (PTP) with 90% acc Independently with vowels   Not assessed this date    4. Pt will increase word reading accuracy during Multiple Oral Re-reading (MOR) passages to 90% acc with standby cues over 3x sessions to increase patients functional reading   Not addressed     6. Pt will complete simple-mod complex functional reading tasks with 80% acc min assist     When out in community for outing at the restaurant pt reported that she didn't attempt to read the menu. Functional reading of weather forecast was 79% acc Ind  7.  Pt will complete functional phrase/sentence writing tasks with 80% acc standby cues  Divergent naming writing with 87% acc Ind. Pain Assessment:  Initial Assessment:  Patient denies pain. Re-assessment:  Patient denies pain. Plan:  Continue with current goals    Patient/Caregiver Education:  Patient/Caregiver educated on session.   Patient/Caregiver provided with home program: functional prescription label reading task   Time in: 1400  Time out: [address]  Minutes seen: 45 minutes    Signature: Electronically signed by ARRON Self on 3/23/2021 at 2:53 PM

## 2021-03-25 ENCOUNTER — APPOINTMENT (OUTPATIENT)
Dept: OCCUPATIONAL THERAPY | Age: 76
End: 2021-03-25
Payer: MEDICARE

## 2021-03-25 ENCOUNTER — HOSPITAL ENCOUNTER (OUTPATIENT)
Dept: SPEECH THERAPY | Age: 76
Setting detail: THERAPIES SERIES
Discharge: HOME | End: 2021-03-25
Payer: MEDICARE

## 2021-03-25 PROCEDURE — 92507 TX SP LANG VOICE COMM INDIV: CPT

## 2021-03-25 NOTE — PROGRESS NOTES
functional written description task of pictures with 80% acc Ind and increased to 90% acc with min cues    Pain Assessment:  Initial Assessment:  Patient denies pain. Re-assessment:  Patient denies pain. Plan:  Continue with current goals    Patient/Caregiver Education:  Patient/Caregiver educated on session. Patient/Caregiver provided with home program:  Mod.  Complex paragraph reading task  Time in: 1400  Time out: [address]   Minutes seen: 45 minutes    Signature: Electronically signed by ARRON Dempsey on 3/25/2021 at 2:54 PM

## 2021-03-30 ENCOUNTER — APPOINTMENT (OUTPATIENT)
Dept: OCCUPATIONAL THERAPY | Age: 76
End: 2021-03-30
Payer: MEDICARE

## 2021-03-30 ENCOUNTER — HOSPITAL ENCOUNTER (OUTPATIENT)
Dept: SPEECH THERAPY | Age: 76
Setting detail: THERAPIES SERIES
Discharge: HOME | End: 2021-03-30
Payer: MEDICARE

## 2021-03-30 PROCEDURE — 92507 TX SP LANG VOICE COMM INDIV: CPT

## 2021-03-30 NOTE — PROGRESS NOTES
[id number] [facility] Outpatient  Speech Language Pathology  Adult Daily Note    Malika Cole  : 1945    Date: 3/30/2021    Visit Information:  SLP Insurance Information: Medicare  Total # of Visits Approved: 35  Total # of Visits to Date: 24  No Show: 0  Canceled Appointment: 0        Plan of care signed (Y/N):     Yes    Certification Period: 3/9/21-21  Plan of Care Visit # 24    Interventions used this date:  Expressive Language, Receptive Language and Instruction in Compensatory Strategies    Subjective: Pt was on time and cooperative with all exercises. Pt reported that she was starting to try and email on her iPad more and had started word searches. Patient was given new schedule for April. Alert and Cooperative       Objective/Assessment:   Patient progressing towards goals: 1. To increase safety awareness, judgment and complex expression, pt will complete written abstract reasoning tasks (i.e. Word deduction, convergent and divergent naming, similarities/differences) with 90% accuracy and standby cues. Not addressed   2. Pt will complete sound-letter correspondence with phonological treatment program (PTP) with 90% acc Independently with vowels   Not addressed     3. Pt will complete letter-sound correspondence with phonological treatment program (PTP) with 90% acc Independently with vowels   Not assessed this date    4. Pt will increase word reading accuracy during Multiple Oral Re-reading (MOR) passages to 90% acc with standby cues over 3x sessions to increase patients functional reading   D/c goal  Add goal:   Pt will completed lexical and sublexical alexia treatment with single words with 90% acc over all steps Independently   Baseline with trained set of words patient achieved 85% acc with min verbal cues. Patient continues to show decreased insight/understanding of phoneme to grapheme importance.      6. Pt will complete simple-mod complex functional reading tasks with 80% acc min assist   Pt returned paragraph reading homework assignment with noted 10 reading errors    7. Pt will complete functional phrase/sentence writing tasks with 80% acc standby cues      Pain Assessment:  Initial Assessment:  Patient denies pain. Re-assessment:  Patient denies pain. Plan:  Continue with current goals    Patient/Caregiver Education:  Patient/Caregiver educated on session.   Patient/Caregiver provided with home program:   Time in: 1400  Time out: [address]  Minutes seen: 45 minutes    Signature: Electronically signed by ARRON Baldwin on 3/30/2021 at 2:58 PM

## 2021-04-01 ENCOUNTER — APPOINTMENT (OUTPATIENT)
Dept: OCCUPATIONAL THERAPY | Age: 76
End: 2021-04-01
Payer: MEDICARE

## 2021-04-01 ENCOUNTER — HOSPITAL ENCOUNTER (OUTPATIENT)
Dept: SPEECH THERAPY | Age: 76
Setting detail: THERAPIES SERIES
Discharge: HOME | End: 2021-04-01
Payer: MEDICARE

## 2021-04-01 PROCEDURE — 92507 TX SP LANG VOICE COMM INDIV: CPT

## 2021-04-01 NOTE — PROGRESS NOTES
[id number] [facility] Outpatient  Speech Language Pathology  Adult Daily Note    Dontae Coon  : 1945    Date: 2021    Visit Information:       SLP Insurance Information: Medicare  Total # of Visits Approved: 35  Total # of Visits to Date: 25  No Show: 0  Canceled Appointment: 0           Plan of care signed (Y/N):     Yes    Certification Period: 3/9/21-21  Plan of Care Visit # 25    Interventions used this date:  Expressive Language, Receptive Language and Instruction in Compensatory Strategies    Subjective: Pt was on time and cooperative with all exercises. Pt verbalized difficulty at home with sequencing daily tasks (e.g. feeding the cats) and will get off track very easily. Alert and Cooperative       Objective/Assessment:   Patient progressing towards goals: 1. To increase safety awareness, judgment and complex expression, pt will complete written abstract reasoning tasks (i.e. Word deduction, convergent and divergent naming, similarities/differences) with 90% accuracy and standby cues. Not addressed   2. Pt will complete sound-letter correspondence with phonological treatment program (PTP) with 90% acc Independently with vowels   Not addressed     3. Pt will complete letter-sound correspondence with phonological treatment program (PTP) with 90% acc Independently with vowels   Not assessed this date    4. Pt will completed lexical and sublexical alexia treatment with single words with 90% acc over all steps Independently       6. Pt will complete simple-mod complex functional reading tasks with 80% acc min assist   Pt completed mid level complexity sentence completion task with 80% acc min verbal cues and moderate increased processing time     Completed simple written 6 step sequencing task with 100% acc min verbal cues    7.  Pt will complete functional phrase/sentence writing tasks with 80% acc standby cues  Pt completed functional writing task transferring written numbers to numerical form with 70% acc mod cues     Pain Assessment:  Initial Assessment:  Patient denies pain. Re-assessment:  Patient denies pain. Plan:  Continue with current goals    Patient/Caregiver Education:  Patient/Caregiver educated on session. Patient/Caregiver provided with home program: SLP provided patient with packet for HEP since decreasing frequency to 1x/wk. Packet included: verbal reasoning tasks, organizing functional information and paragraph reading tasks. Instructed patient to try one exercise per day and try to keep track of difficulty with tasks in a notebook.      Time in: 1400  Time out: 1445  Minutes seen: 45 minutes    Signature: Electronically signed by ARRON Nolan on 4/1/2021 at 2:58 PM

## 2021-04-08 ENCOUNTER — HOSPITAL ENCOUNTER (OUTPATIENT)
Dept: SPEECH THERAPY | Age: 76
Setting detail: THERAPIES SERIES
Discharge: HOME | End: 2021-04-08
Payer: MEDICARE

## 2021-04-08 PROCEDURE — 92507 TX SP LANG VOICE COMM INDIV: CPT

## 2021-04-08 NOTE — PROGRESS NOTES
[x][facility]    [][facility] [address] [place] [facility]     [address] [facility], [address] [facility], [address]      Phone: [phone]     Phone: [phone]      Fax: [phone]     Fax: [phone]    ______________________________________________________________________                [facility] Outpatient  Speech Language Pathology  Adult Progress Note                          Physician: Dr. Jareth Salamanca MD  From: Beckie Valentin MA,CCC-SLP   Patient: Gilbert Ramirez       : 1945  Diagnosis: Aphasia    Date: 2021  Treatment Diagnosis: R47.01  Date of Evaluation: 9/10/20      Plan of Care/Treatment to date: Expressive Language Therapy, Receptive Language Therapy and Instruction in Compensatory Strategies    Date range from 3/9/21 to 21. Subjective: Pt has attended all sessions during this treatment period and reports doing all HEP and trying to increase the amount of functional reading done at home. Progress toward Short-Term Goals: 1. To increase safety awareness, judgment and complex expression, pt will complete written abstract reasoning tasks (i.e. Word deduction, convergent and divergent naming, similarities/differences) with 90% accuracy and standby cues. Goal met, increase to [place]   2. Pt will complete sound-letter correspondence with phonological treatment program (PTP) with 90% acc Independently with vowels   Progress made, difficulty with vowels      3. Pt will complete letter-sound correspondence with phonological treatment program (PTP) with 90% acc Independently with vowels   Difficulty with vowels     4. Pt will completed lexical and sublexical alexia treatment with single words with 90% acc over all steps Independently   Initiated on one treatment, difficulty with grapheme to phoneme conversion step     6.  Pt will complete simple-mod complex functional reading tasks with 80% acc min assist   Progress made, showing decreased reading memory     7. Pt will complete functional phrase/sentence writing tasks with 80% acc standby cues   Goal met, increased to independence    Updated Short-Term Goals: 1. To increase safety awareness, judgment and complex expression, pt will complete written abstract reasoning tasks (i.e. Word deduction, convergent and divergent naming, similarities/differences) with 90% accuracy Independently . 2. Pt will complete sound-letter correspondence with phonological treatment program (PTP) with 90% acc Independently with vowels   3. Pt will complete letter-sound correspondence with phonological treatment program (PTP) with 90% acc Independently with vowels   4. Pt will completed lexical and sublexical alexia treatment with single words with 90% acc over all steps Independently   6. Pt will complete simple-mod complex functional reading tasks with 80% acc min assist.  7. Pt will complete functional phrase/sentence writing tasks with 80% acc Independently     Long-Term Goals:   1. Pt will improve her expressive language abilities to a sentence/conversation level for effective communication with familiar and unfamiliar communication partners so they may functionally communicate and express complex ideas. 2. Pt will improve her reading comprehension abilities to a phrase level for effective comprehension of functional materials. Frequency/Duration of Treatment:   Days: 1 day/week  Length of Session:  45 minutes and 60 minutes  Weeks: 10 Weeks    Rehab Potential: Good    Prognostic Factors: Motivation  Family/community support  Participation level  Previous level of function       Goal Status: Partially Achieved      Patient Status: Continue per initial Plan of Care    Discharge Plan: home          This patients condition is expected to improve within the treatment timeframe.     MODIFIED WELCH FALL RISK ASSESSMENT:    History of Falling (has patient fallen in the past 30 days?):    No (0 points)    Secondary Diagnosis (is there more than 1 medical diagnosis in patients medical history?):    Yes (15 points)    Ambulatory Aid:    No device is used (0 points)    Gait:    Normal/bedrest/wheelchair (0 points)    Mental Status:    Oriented to own ability (0 points)      Total points = 15    Fall Risk Level: low    0 - 24: Low Risk - implement low risk fall prevention interventions    25 - 44: Medium risk  45 and higher: High Risk    JASMYN NOMS: N/A      Electronically signed by:  Electronically signed by Tera Rinne, SLP on 4/8/2021 at 10:31 AM       If you have any questions or concerns, please don't hesitate to call.   Thank you for your referral.      Physician Signature:________________________________Date:__________________  By signing above, therapists plan is approved by physician

## 2021-04-08 NOTE — PROGRESS NOTES
[facility] Outpatient  Speech Language Pathology  Adult Daily Note    Radha Guo  : 1945    Date: 2021    Visit Information:  SLP Insurance Information: Medicare  Total # of Visits Approved: 35 Total # of Visits to Date:    No Show: 0  Canceled Appointment: 0    Plan of care signed (Y/N):     Yes    Certification Period: 3/9/21-21  Plan of Care Visit # 26    Interventions used this date:  Expressive Language, Receptive Language and Instruction in Compensatory Strategies    Subjective: Pt was on time and cooperative with all exercises. Pt said that she had a good Easter weekend with her family and reports that she is working through [employer] given last week. Reported mild difficulty with some word deduction tasks. Alert and Cooperative       Objective/Assessment:   Patient progressing towards goals: 1. To increase safety awareness, judgment and complex expression, pt will complete written abstract reasoning tasks (i.e. Word deduction, convergent and divergent naming, similarities/differences) with 90% accuracy and standby cues. Completed written complex reasoning task writing opposites with 90% acc  2. Pt will complete sound-letter correspondence with phonological treatment program (PTP) with 90% acc Independently with vowels   Not addressed     3. Pt will complete letter-sound correspondence with phonological treatment program (PTP) with 90% acc Independently with vowels   Not assessed this date    4. Pt will completed lexical and sublexical alexia treatment with single words with 90% acc over all steps Independently   Not addressed     6.  Pt will complete simple-mod complex functional reading tasks with 80% acc min assist   Pt completed mid level reading task reading single words with 90% acc min cues   Pt completed mod level reading task of single step written directions with min-mod verbal cues and followed directions with 100% acc mod verbal cues for decreased reading memory of direction    7. Pt will complete functional phrase/sentence writing tasks with 80% acc standby cues  Pt wrote opposites with 90% acc spelling single words     Pain Assessment:  Initial Assessment:  Patient denies pain. Re-assessment:  Patient denies pain. Plan:  Continue with current goals    Patient/Caregiver Education:  Patient/Caregiver educated on session.   Patient/Caregiver provided with home program: continue homework packet and written directions task    Time in: 0830  Time out: [address]  Minutes seen: 45 minutes    Signature: Electronically signed by ARRON Mcdaniel on 4/8/2021 at 9:37 AM

## 2021-04-15 ENCOUNTER — HOSPITAL ENCOUNTER (OUTPATIENT)
Dept: SPEECH THERAPY | Age: 76
Setting detail: THERAPIES SERIES
Discharge: HOME | End: 2021-04-15
Payer: MEDICARE

## 2021-04-15 PROCEDURE — 92507 TX SP LANG VOICE COMM INDIV: CPT

## 2021-04-15 NOTE — PROGRESS NOTES
[facility]  Speech Language Pathology  Adult Daily Note    Justyn Hardwick  : 1945    Date: 4/15/2021    Visit Information:         SLP Insurance Information: Medicare  Total # of Visits Approved: 35  Total # of Visits to Date: 27  No Show: 0  Canceled Appointment: 0          Plan of care signed (Y/N):     Yes    Certification Period:   Plan of Care Visit # 27    Interventions used this date:  Expressive Language, Receptive Language and Instruction in Compensatory Strategies    Subjective: Pt was on time and cooperative with all exercises. Reported completing written direction homework and had a challenge with recalling what she had read. Alert and Cooperative       Objective/Assessment:   Patient progressing towards goals: 1. To increase safety awareness, judgment and complex expression, pt will complete written abstract reasoning tasks (i.e. Word deduction, convergent and divergent naming, similarities/differences) with 90% accuracy Independently . 2. Pt will complete sound-letter correspondence with phonological treatment program (PTP) with 90% acc Independently with vowels   3. Pt will complete letter-sound correspondence with phonological treatment program (PTP) with 90% acc Independently with vowels   4. Pt will completed lexical and sublexical alexia treatment with single words with 90% acc over all steps Independently   6. Pt will complete simple-mod complex functional reading tasks with 80% acc min assist.  Completed paragraph reading task with 84% acc Ind with 6x self corrections of errors and increased to 98% acc with min verbal phonemic cues. Pt answered comprehension questions about paragraph with 100% acc Ind.   7. Pt will complete functional phrase/sentence writing tasks with 80% acc Independently   Completed writing task describing attributes of objects with 60% acc Ind with 1x spelling error and 1x error with vague description.      Pain Assessment:  Initial

## 2021-04-22 ENCOUNTER — HOSPITAL ENCOUNTER (OUTPATIENT)
Dept: SPEECH THERAPY | Age: 76
Setting detail: THERAPIES SERIES
Discharge: HOME | End: 2021-04-22
Payer: MEDICARE

## 2021-04-22 PROCEDURE — 92507 TX SP LANG VOICE COMM INDIV: CPT

## 2021-04-22 NOTE — PROGRESS NOTES
[id number] [facility]  Speech Language Pathology  Adult Daily Note    Molly Roy  : 1945    Date: 2021    Visit Information:                    SLP Insurance Information: Medicare  Total # of Visits Approved: 35  Total # of Visits to Date:   No Show: 0  Canceled Appointment: 0             Plan of care signed (Y/N):     Yes    Certification Period:   Plan of Care Visit # 28    Interventions used this date:  Expressive Language, Receptive Language and Instruction in Compensatory Strategies    Subjective: Pt was on time and cooperative with all exercises. Pt reported increased frustration this past week with reading progress. She mentioned that she felt like she was regressing in her abilities and noticed having more difficulty with \"Wh\" words. She verbalized that she wished she could still do 2x/week. SLP reassured her that there were going to be points in recovery that were up and down and that she thought it was best to continue with the 1x/wk to continue to stretch out her 7 visits that are left. Pt verbalized understanding    Alert and Cooperative       Objective/Assessment:   Patient progressing towards goals: 1. To increase safety awareness, judgment and complex expression, pt will complete written abstract reasoning tasks (i.e. Word deduction, convergent and divergent naming, similarities/differences) with 90% accuracy Independently . 2. Pt will complete sound-letter correspondence with phonological treatment program (PTP) with 90% acc Independently with vowels   3. Pt will complete letter-sound correspondence with phonological treatment program (PTP) with 90% acc Independently with vowels   4. Pt will completed lexical and sublexical alexia treatment with single words with 90% acc over all steps Independently   6. Pt will complete simple-mod complex functional reading tasks with 80% acc min assist.  Pt completed reading of mod.  Level paragraph with 12x errors and 3 that were unable to be corrected with phonemic cueing from SLP. Completed reading of one step written directions with 80% acc min-mod verbal cues  7. Pt will complete functional phrase/sentence writing tasks with 80% acc Independently   Pt followed 1-2 step written directions with 80% acc min cues for reading and recall of directions. Pain Assessment:  Initial Assessment:  Patient denies pain. Re-assessment:  Patient denies pain. Plan:  Continue with current goals    Patient/Caregiver Education:  Patient/Caregiver educated on session.   Patient/Caregiver provided with home program: Continue home exercise packet, provided list of [facility] words and instructed to read over and practice writing them    Time in: 0830  Time out: 0915  Minutes seen: 45 minutes    Signature: Electronically signed by ARRON Sinclair on 4/22/2021 at 10:14 AM

## 2021-04-29 ENCOUNTER — HOSPITAL ENCOUNTER (OUTPATIENT)
Dept: SPEECH THERAPY | Age: 76
Setting detail: THERAPIES SERIES
Discharge: HOME | End: 2021-04-29
Payer: MEDICARE

## 2021-04-29 PROCEDURE — 92507 TX SP LANG VOICE COMM INDIV: CPT

## 2021-04-29 NOTE — PROGRESS NOTES
[facility]  Speech Language Pathology  Adult Daily Note    aIn Wolfe  : 1945    Date: 2021    Visit Information:    SLP Insurance Information: Medicare  Total # of Visits Approved: 35  Total # of Visits to Date: 34  Canceled Appointment: 0  No Show: 0      Plan of care signed (Y/N):     Yes    Certification Period:   Plan of Care Visit # 29    Interventions used this date:   Receptive Language and Instruction in Compensatory Strategies    Subjective: Pt was on time and cooperative with all exercises. At end of session spouse expressed desire to speak with SLP next session about progress and current deficits. SLP said she would end a little early to have discussion. Alert and Cooperative       Objective/Assessment:   Patient progressing towards goals: 1. To increase safety awareness, judgment and complex expression, pt will complete written abstract reasoning tasks (i.e. Word deduction, convergent and divergent naming, similarities/differences) with 90% accuracy Independently . Pt completed an abstract language task choosing words that mean nearly the same things with 100% acc Mod I  Pt read the words in the list with 92% acc min verbal cues  2. Pt will complete sound-letter correspondence with phonological treatment program (PTP) with 90% acc Independently with vowels   3. Pt will complete letter-sound correspondence with phonological treatment program (PTP) with 90% acc Independently with vowels   4. Pt will completed lexical and sublexical alexia treatment with single words with 90% acc over all steps Independently   6. Pt will complete simple-mod complex functional reading tasks with 80% acc min assist.  Patient reported that recently she notices that every time she reads the word \"in\" the letters are flipped. Patient reported that she read a text message from a friend this date. She said it took about an hour to work through it.    Pt completed reading of

## 2021-05-06 ENCOUNTER — HOSPITAL ENCOUNTER (OUTPATIENT)
Dept: SPEECH THERAPY | Age: 76
Setting detail: THERAPIES SERIES
Discharge: HOME | End: 2021-05-06
Payer: MEDICARE

## 2021-05-06 PROCEDURE — 92507 TX SP LANG VOICE COMM INDIV: CPT

## 2021-05-06 NOTE — PROGRESS NOTES
[facility]  Speech Language Pathology  Adult Daily Note    Sasha Ivory  : 1945    Date: 2021    Visit Information:    SLP Insurance Information: Medicare  Total # of Visits Approved: 35  Total # of Visits to Date: 34  Canceled Appointment: 0  No Show: 0      Plan of care signed (Y/N):     Yes    Certification Period: 21-21  Plan of Care Visit # 30    Interventions used this date:   Receptive Language and Instruction in Compensatory Strategies    Subjective: Pt was on time and cooperative with all exercises. Pt expressed frustration with current progress in therapy and feels like there isn't a clear plan for her advancement. Pt wanted to know if there was a set way to progress in therapy. SLP educated her on the fact that therapy is individualized to the patient's needs. SLP verbalized that they had tried multiple different approaches up to this point including: Multiple Oral rereading, phonological treatment. SLP spoke with  about progress thus far and that there are 5 more treatment sessions approved. Spouse asked where they would go from here after sessions are completed. SLP educated on the use of advanced reading kyle from [employer] to continue use of multiple oral re-reading program.  said next time he would write the information down. SLP also educated the  and patient on the fact that her visual deficits are probably interfering with progress in reading and  verbalized understanding. Alert and Cooperative       Objective/Assessment:   Patient progressing towards goals: 1. To increase safety awareness, judgment and complex expression, pt will complete written abstract reasoning tasks (i.e. Word deduction, convergent and divergent naming, similarities/differences) with 90% accuracy Independently .   Pt read the words in the list during verbal abstract reasoning task with 73% acc Ind and completed the verbal reasoning portion of

## 2021-05-11 NOTE — PROGRESS NOTES
[x][facility]    [][facility] [address] [place] [facility] [place]     [address] [place], [address] [place], [address]      Phone: [phone]     Phone: [phone]      Fax: [phone]     Fax: [phone]    ______________________________________________________________________                [facility]  Speech Language Pathology  Adult Progress Note                          Physician: Dr. Jaydon Hassan MD  From: Leti Narayan MA,CCC-SLP   Patient: Kathryn Forrest       : 1945  Diagnosis: Aphasia    Date: 2021  Treatment Diagnosis: Aphasia R47.01  Date of Evaluation: 9/10/20      Plan of Care/Treatment to date: Expressive Language Therapy, Receptive Language Therapy and Instruction in Compensatory Strategies    Date range from 21 to 21. Subjective: Pt has attended all sessions during this treatment period. She has started to report frustration since decreasing to 1x/week. Verbalizing that she feels like her progress has regressed.  is wondering if she has hit a plateau. SLP educated them on the multiple approaches initiated since beginning treatment. SLP also recommended continuing to pursue a neuropthamologist for visual deficits. Progress toward Short-Term Goals: 1. To increase safety awareness, judgment and complex expression, pt will complete written abstract reasoning tasks (i.e. Word deduction, convergent and divergent naming, similarities/differences) with 90% accuracy Independently .  Goal met   2. Pt will complete sound-letter correspondence with phonological treatment program (PTP) with 90% acc Independently with vowels   Decreased insight into need for phoneme-grapheme correspondence   3.  Pt will complete letter-sound correspondence with phonological treatment program (PTP) with 90% acc Independently with vowels   Decreased insight into need for phoneme-grapheme correspondence  4. Pt will completed lexical and sublexical alexia treatment with single words with 90% acc over all steps Independently   D/C goal, trial ADIS   6. Pt will complete simple-mod complex functional reading tasks with 80% acc min assist.  Goal met for simple level  7. Pt will complete functional phrase/sentence writing tasks with 80% acc Independently   Progress made 60% acc Ind    Updated Short-Term Goals:  D/c goal 1 and 4  Continue goals 2, 3, 7  Adjust goal 6: Pt will complete mid-mod level complex functional reading tasks with 80% acc min assist to increase functional reading and decrease dependence on caregivers  Add goal: Pt will complete mid level sentence reading task using Oral Reading for Language in Aphasia (AIDS) with 90% acc min assist to increase functional reading independence    Pt and spouse will be trained in home reading programs and verbalize understanding for continued practice upon discharge. Long-Term Goals:   1. Pt will improve her expressive language abilities to a sentence/conversation level for effective communication with familiar and unfamiliar communication partners so they may functionally communicate and express complex ideas. 2. Pt will improve her reading comprehension abilities to a phrase level for effective comprehension of functional materials. Frequency/Duration of Treatment:   Days: 1 day/week  Length of Session:  45 minutes and 60 minutes  Weeks: 4 Weeks  and 6 Weeks    Rehab Potential: Good    Prognostic Factors: Motivation  Family/community support  Participation level  Previous level of function       Goal Status: Partially Achieved      Patient Status: Continue per initial Plan of Care    Discharge Plan: home          This patients condition is expected to improve within the treatment timeframe.     MODIFIED WELCH FALL RISK ASSESSMENT:    History of Falling (has patient fallen in the past 30 days?):    No (0 points)    Secondary Diagnosis (is there more than 1 medical diagnosis in patients medical history?):    No (0 points)    Ambulatory Aid:    No device is used (0 points)    Gait:    Normal/bedrest/wheelchair (0 points)    Mental Status:    Oriented to own ability (0 points)      Total points = 0    Fall Risk Level: low    0 - 24: Low Risk - implement low risk fall prevention interventions    25 - 44: Medium risk  45 and higher: High Risk    JASMYN NOMS: Updated      Electronically signed by:  Electronically signed by ARRON Russell on 5/11/2021 at 3:05 PM       If you have any questions or concerns, please don't hesitate to call.   Thank you for your referral.      Physician Signature:________________________________Date:__________________  By signing above, therapists plan is approved by physician

## 2021-05-13 ENCOUNTER — HOSPITAL ENCOUNTER (OUTPATIENT)
Dept: SPEECH THERAPY | Age: 76
Setting detail: THERAPIES SERIES
Discharge: HOME | End: 2021-05-13
Payer: MEDICARE

## 2021-05-13 PROCEDURE — 92507 TX SP LANG VOICE COMM INDIV: CPT

## 2021-05-13 NOTE — PROGRESS NOTES
[facility]  Speech Language Pathology  Adult Daily Note    July Pollard  : 1945    Date: 2021    Visit Information:    SLP Insurance Information: Medicare  Total # of Visits Approved: 35  Total # of Visits to Date: 31  No Show: 0  Canceled Appointment: 0      Plan of care signed (Y/N):     Yes    Certification Period: 21-21  Plan of Care Visit # 31    Interventions used this date:   Receptive Language and Instruction in Compensatory Strategies    Subjective: Pt was on time and cooperative with all exercises. .  Alert and Cooperative       Objective/Assessment:   Patient progressing towards goals:  1. Pt will complete sound-letter correspondence with phonological treatment program (PTP) with 90% acc Independently with vowels      2. Pt will complete letter-sound correspondence with phonological treatment program (PTP) with 90% acc Independently with vowels  3. Pt will complete mid-mod level complex functional reading tasks with 80% acc min assist to increase functional reading and decrease dependence on caregivers    4. Pt will complete functional phrase/sentence writing tasks with 80% acc Independently   5. Pt will complete mid level sentence reading task using Oral Reading for Language in Aphasia (ADIS) with 90% acc min assist to increase functional reading independence  Initiated ADIS program with 3-4 sentence passages  Pt completed choral reading with mild extra processing time. Pt ID single words with 100% acc Mod I and read single words from dictation with 100% acc Ind. Pt then read passages with 100% acc min assist after practiced readings with clinician. 6.Pt and spouse will be trained in home reading programs and verbalize understanding for continued practice upon discharge.    Patient and spouse were given packet of information about Multiple oral re-reading program and shown Advanced reading kyle on iPad for use with program. Spouse said he would read over packet for next week. Pain Assessment:  Initial Assessment:  Patient denies pain. Re-assessment:  Patient denies pain. Plan:  Continue with current goals    Patient/Caregiver Education:  Patient/Caregiver educated on session.   Patient/Caregiver provided with home program:     Time in: 0830  Time out: 0915  Minutes seen: 45 minutes    Signature:Electronically signed by ARRON Barros on 5/13/2021 at 10:27 AM

## 2021-05-13 NOTE — PROGRESS NOTES
[address] [facility])  FUNCTIONAL COMMUNICATION MEASURES  ADULT    Patient: Dontae Coon  : 1945  MRN: [id number]    Date: 2021   [facility] Record Number: [id number]      TYPE OF ENTRANCE:   Update and initial for reading    FUNCTIONAL READING:   Comprehend basic sight words/phrases in everyday contexts (e.g., name, street, bathroom, signs, labels): 76-90% of the time   Comprehend simple sentences that contain familiar vocabulary   Comprehend simple written instructions/directions or references (e.g., simple forms, directories, schedules, maps): 76-90% of the time   Comprehend lengthy material with familiar vocabulary (e.g., draw accurate conclusions, identify main points): 50-75% of the time   Comprehend lengthy material with complex content/vocabulary (e.g., school, work, community, financial, or medical material): 50-75% of the time   Function safely WITHOUT additional assistance/supervision due to reading deficits (no more than would be expected for chronological age): 50-75% of the time    Score: 63%    SPOKEN LANGUAGE EXPRESSION:    Exhibits spoken language expression difficulties that are noticeable or distracting to the listener? Note: consider message content, form, pauses, extra time. : % of the time   Produces simple spoken word and phrases that are meaningful?: % of the time   Produces verbal messages with appropriate FORM in LOW demand situations? Note: consider phonology, morphology and syntax when assessing functional level: % of the time   Produces verbal messages with appropriate CONTENT in LOW demand situations? Note: consider semantic meaning when assessing functional level. : % of the time   Produces verbal messages with appropriate FORM in HIGH demand situations (e.g., academic or work-related tasks)? Note: consider phonology, morphology and syntax when assessing functional level.   % of the time  Ana Mee Produce sverbal messages with appropriate CONTENT in HIGH demand situations (e.g., academic or work-related tasks)?  Note: consider semantic meaning when assessing functional level: % of the time   Participates in communication exchanges WITHOUT additional assistance from communication partner (no more than would be expected for chronological age)? : % of the time    Score: 100%    Electronically Signed by: Leti Narayan MA, CCC-SLP

## 2021-05-20 ENCOUNTER — HOSPITAL ENCOUNTER (OUTPATIENT)
Dept: SPEECH THERAPY | Age: 76
Setting detail: THERAPIES SERIES
Discharge: HOME | End: 2021-05-20
Payer: MEDICARE

## 2021-05-20 PROCEDURE — 92507 TX SP LANG VOICE COMM INDIV: CPT

## 2021-05-20 NOTE — PROGRESS NOTES
"Chief Complaint   Patient presents with     Well Child     4 year [facility]     Health Maintenance     UTD       Initial Temp 97.3  F (36.3  C) (Axillary)  Ht 3' 0.02\" (0.915 m)  Wt 33 lb 15.5 oz (15.4 kg)  HC 20.87\" (53 cm)  BMI 18.4 kg/m2 Estimated body mass index is 18.4 kg/(m^2) as calculated from the following:    Height as of this encounter: 3' 0.02\" (0.915 m).    Weight as of this encounter: 33 lb 15.5 oz (15.4 kg).  Medication Reconciliation: complete   Katherine Randall MA      " [facility]  Speech Language Pathology  Adult Daily Note    Maricruz Wiseman  : 1945    Date: 2021    Visit Information:    SLP Insurance Information: Medicare  Total # of Visits Approved: 35  Total # of Visits to Date: 32  No Show: 0  Canceled Appointment: 0      Plan of care signed (Y/N):     Yes    Certification Period: 21-21  Plan of Care Visit # 32    Interventions used this date:   Receptive Language and Instruction in Compensatory Strategies    Subjective: Pt was on time and cooperative with all exercises. Alert and Cooperative       Objective/Assessment:   Patient progressing towards goals:  1. Pt will complete sound-letter correspondence with phonological treatment program (PTP) with 90% acc Independently with vowels      2. Pt will complete letter-sound correspondence with phonological treatment program (PTP) with 90% acc Independently with vowels  3. Pt will complete mid-mod level complex functional reading tasks with 80% acc min assist to increase functional reading and decrease dependence on caregivers  Pt completed written direction task  Reading sentence directions 80% acc min cues  Following written directions 60% acc Ind  Reading memory 100% acc with standby cues  4. Pt will complete functional phrase/sentence writing tasks with 80% acc Independently   5. Pt will complete mid level sentence reading task using Oral Reading for Language in Aphasia (ADIS) with 90% acc min assist to increase functional reading independence   ADIS program with 3-4 sentence passages  Pt completed choral reading with mild extra processing time. Pt ID single words with 75% acc I and increased to 100% acc with standby cues, read single words from dictation with 100% acc standby cues. Pt then read passages with 90% acc min assist after practiced readings with clinician.    6.Pt and spouse will be trained in home reading programs and verbalize understanding for continued practice upon discharge. Spouse was updated about current ADIS program and patient expressed that she felt the program was helping    Pain Assessment:  Initial Assessment:  Patient denies pain. Re-assessment:  Patient denies pain. Plan:  Continue with current goals    Patient/Caregiver Education:  Patient/Caregiver educated on session.   Patient/Caregiver provided with home program:     Time in: 0830  Time out: 0915  Minutes seen: 45 minutes    Signature:Electronically signed by ARRON Auguste Cha on 5/20/2021 at 9:27 AM

## 2021-05-27 ENCOUNTER — HOSPITAL ENCOUNTER (OUTPATIENT)
Dept: SPEECH THERAPY | Age: 76
Setting detail: THERAPIES SERIES
Discharge: HOME | End: 2021-05-27
Payer: MEDICARE

## 2021-05-27 PROCEDURE — 92507 TX SP LANG VOICE COMM INDIV: CPT

## 2021-05-27 NOTE — PROGRESS NOTES
spouse will be trained in home reading programs and verbalize understanding for continued practice upon discharge. Sister was updated about current ADIS program    Pain Assessment:  Initial Assessment:  Patient denies pain. Re-assessment:  Patient denies pain. Plan:  Continue with current goals    Patient/Caregiver Education:  Patient/Caregiver educated on session. Patient/Caregiver provided with home program: Sister Holly Paulino educated on Ramy program and how it can be used with any reading passage. Holly Paulino asked for another copy of the reading therapy kyle used before. Holly Paulino also asked about any community support groups or resources for after therapy is done. SLP said she would research for any groups that are maybe starting to open up after COVID.      Time in: 0830  Time out: Lina 58  Minutes seen: 48 minutes    Signature:Electronically signed by ARRON Barros on 5/27/2021 at 10:12 AM

## 2021-06-03 ENCOUNTER — HOSPITAL ENCOUNTER (OUTPATIENT)
Dept: SPEECH THERAPY | Age: 76
Setting detail: THERAPIES SERIES
Discharge: HOME | End: 2021-06-03
Payer: MEDICARE

## 2021-06-03 PROCEDURE — 92507 TX SP LANG VOICE COMM INDIV: CPT

## 2021-06-03 NOTE — PROGRESS NOTES
[facility] Outpatient  Speech Language Pathology  Adult Daily Note    Tanner Face  : 1945    Date: 6/3/2021    Visit Information:    SLP Insurance Information: Medicare  Total # of Visits Approved: 35  Total # of Visits to Date: 29   SLP Insurance Information: Medicare  Total # of Visits Approved: 35  Total # of Visits to Date: 29  No Show: 0  Canceled Appointment: 0      Plan of care signed (Y/N):     Yes    Certification Period: 21-21  Plan of Care Visit # 34    Interventions used this date:   Receptive Language and Instruction in Compensatory Strategies    Subjective: Pt was on time and cooperative with all exercises. Pt's sister accompanied this date. Alert and Cooperative       Objective/Assessment:   Patient progressing towards goals:  1. Pt will complete sound-letter correspondence with phonological treatment program (PTP) with 90% acc Independently with vowels      2. Pt will complete letter-sound correspondence with phonological treatment program (PTP) with 90% acc Independently with vowels  3. Pt will complete mid-mod level complex functional reading tasks with 80% acc min assist to increase functional reading and decrease dependence on caregivers    4. Pt will complete functional phrase/sentence writing tasks with 80% acc Independently   Not addressed   5. Pt will complete mid level sentence reading task using Oral Reading for Language in Aphasia (ADIS) with 90% acc min assist to increase functional reading independence   ADIS program with 3x 4-5 sentence passages  Pt completed choral reading with mild extra processing time and time for following with finger. Pt ID single words with 90% acc standby cues, read single words from dictation with 100% acc min cues. Pt then read passages with 100% acc min assist.  6.Pt and spouse will be trained in home reading programs and verbalize understanding for continued practice upon discharge.    SLP verbalized that she was still looking for information about stroke support groups local. Sister asked about cognitive/memory therapy and activities after d/c. SLP said that had not been the focus but she would put together some information for d/c. Sister was given packet with handout about ADIS program and MOR with advanced reading kyle. Pain Assessment:  Initial Assessment:  Patient denies pain. Re-assessment:  Patient denies pain. Plan:  Continue with current goals    Patient/Caregiver Education:  Patient/Caregiver educated on session. Patient/Caregiver provided with home program: Sister Mario Davis educated on [place] program and how it can be used with any reading passage. Mario Davis asked for another copy of the reading therapy kyle used before. Mario Davis also asked about any community support groups or resources for after therapy is done. SLP said she would research for any groups that are maybe starting to open up after COVID.      Time in: 0830  Time out: 135 [address]  Minutes seen: 39    Signature:Electronically signed by ARRON Regalado on 6/3/2021 at 9:44 AM

## 2021-06-10 ENCOUNTER — HOSPITAL ENCOUNTER (OUTPATIENT)
Dept: SPEECH THERAPY | Age: 76
Setting detail: THERAPIES SERIES
Discharge: HOME | End: 2021-06-10
Payer: MEDICARE

## 2021-06-10 PROCEDURE — 92507 TX SP LANG VOICE COMM INDIV: CPT

## 2021-06-10 NOTE — PROGRESS NOTES
[x][employer] [facility] [address]      [][facility]         [address], [address]       [place], [address]     Phone [phone]     Fax [phone]       [facility] Outpatient  Speech Language Pathology  Adult Discharge Note    Nell Coles  : 1945 (70 y.o.)   MRN: [id number]  Patient Diagnosis: Alteration in cognition [R41.89]  Dysphagia, unspecified [R13.10]  Vision impairment [H54.7]  Referring Physician: Dr. Yarelis Berumen MD    Date of Evaluation: 9/10/20           Treatment Diagnosis: Aphasia   ICD10 tx dx code:R47.01      Today's Date: 6/10/2021  Treatment Dates:  From: 9/10/20   To: 6/10/21      TREATMENT ADMINISTERED:  Expressive Language Therapy, Receptive Language Therapy and Instruction in Compensatory Strategies      PROGRESS TO DATE:  Since initial evaluation Roderick Ruff has made significant progress with her expression and receptive abilities. Pt is able to express complex topics with occasional standby cues for word finding and follows complex directions with standby cues. The focus of therapy has been the patients continued alexia deficits. Pt has completed multiple oral re-reading, phonological treatment protocol (to retrain letter and sound corresponces) and ADIS reading programs. Pt continues to have visual deficits that impact her progress in reading. SLP feels that patient has plateaued at this time her treatment and therapy is no longer medically necessary. Pt was given extensive HEP to continue at home with family. 1. Pt will complete sound-letter correspondence with phonological treatment program (PTP) with 90% acc Independently with vowels    Program d/c  Completed one vowel set   2. Pt will complete letter-sound correspondence with phonological treatment program (PTP) with 90% acc Independently with vowels  Program d/c  3.  Pt will complete mid-mod level complex functional reading tasks with 80% acc min assist to increase functional reading and decrease dependence on caregivers     4. Pt will complete functional phrase/sentence writing tasks with 80% acc Independently   Requiring min cues occasionally for spelling  5. Pt will complete mid level sentence reading task using Oral Reading for Language in Aphasia (ADIS) with 90% acc min assist to increase functional reading independence  Goal met, Completing 4-5 sentence paragraphs with % acc with word ID, single word reading and whole passage reading  6. Pt and spouse will be trained in home reading programs and verbalize understanding for continued practice upon discharge.   Goal met, patient, spouse and sister educated on multiple oral re-reading and ADIS program and given support group information      ACHIEVEMENT OF GOALS:  Achieved goals: 4,5,6    REASON FOR DISCHARGE: Patient's progress has plateaued at this time and Other: Pt's visits up for the year with insurance    DISCHARGE EDUCATION/RECOMMENDATIONS: Continue home exercise program as directed      Discharge NOMS: Discharged      Electronically signed by:  ARRON Dupont,M.A., CCC-SLP Date: 6/10/2021, 9:59 AM

## 2021-06-10 NOTE — PROGRESS NOTES
[address] [facility])  FUNCTIONAL COMMUNICATION MEASURES  ADULT    Patient: Kyra Mora  : 1945  MRN: [id number]    Date: 6/10/2021   [facility] Record Number: [id number]      TYPE OF ENTRANCE:   Discharge    FUNCTIONAL READING:   Comprehend basic sight words/phrases in everyday contexts (e.g., name, street, bathroom, signs, labels): % of the time   Comprehend simple sentences that contain familiar vocabulary   Comprehend simple written instructions/directions or references (e.g., simple forms, directories, schedules, maps): 76-90% of the time   Comprehend lengthy material with familiar vocabulary (e.g., draw accurate conclusions, identify main points): 50-75% of the time   Comprehend lengthy material with complex content/vocabulary (e.g., school, work, community, financial, or medical material): 50-75% of the time   Function safely WITHOUT additional assistance/supervision due to reading deficits (no more than would be expected for chronological age): 76-90% of the time    Score: 71%     SPOKEN LANGUAGE EXPRESSION:    Exhibits spoken language expression difficulties that are noticeable or distracting to the listener? Note: consider message content, form, pauses, extra time. : 0-25% of the time   Produces simple spoken word and phrases that are meaningful?: % of the time   Produces verbal messages with appropriate FORM in LOW demand situations? Note: consider phonology, morphology and syntax when assessing functional level: % of the time   Produces verbal messages with appropriate CONTENT in LOW demand situations? Note: consider semantic meaning when assessing functional level. : % of the time   Produces verbal messages with appropriate FORM in HIGH demand situations (e.g., academic or work-related tasks)? Note: consider phonology, morphology and syntax when assessing functional level.   % of the time   Produce sverbal messages with appropriate CONTENT in HIGH demand situations (e.g., academic or work-related tasks)?  Note: consider semantic meaning when assessing functional level: % of the time   Participates in communication exchanges WITHOUT additional assistance from communication partner (no more than would be expected for chronological age)? : % of the time    Score: 100%    Electronically Signed by: Saba Walsh, SLP, MA, CCC-SLP

## 2021-06-10 NOTE — PROGRESS NOTES
[id number] [facility] Outpatient  Speech Language Pathology  Adult Daily Note    Justin Machado  : 1945    Date: 6/10/2021    Visit Information:    SLP Insurance Information: Medicare  Total # of Visits Approved: 35  Total # of Visits to Date:    No Show: 0  Canceled Appointment: 0      Plan of care signed (Y/N):     Yes    Certification Period: 21-21  Plan of Care Visit # 35    Interventions used this date:   Receptive Language and Instruction in Compensatory Strategies    Subjective: Pt was on time and cooperative with all exercises. Pt's sister accompanied this date. This is patients last appoint with speech therapy. Alert and Cooperative       Objective/Assessment:   Patient progressing towards goals:  1. Pt will complete sound-letter correspondence with phonological treatment program (PTP) with 90% acc Independently with vowels      2. Pt will complete letter-sound correspondence with phonological treatment program (PTP) with 90% acc Independently with vowels  3. Pt will complete mid-mod level complex functional reading tasks with 80% acc min assist to increase functional reading and decrease dependence on caregivers    4. Pt will complete functional phrase/sentence writing tasks with 80% acc Independently   Not addressed   5. Pt will complete mid level sentence reading task using Oral Reading for Language in Aphasia (ADIS) with 90% acc min assist to increase functional reading independence   ADIS program with 2x 4-5 sentence passages  Pt completed choral reading with mild extra processing time and time for following with finger. Pt ID single words with 100% acc standby cues, read single words from dictation with % acc min cues. Pt then read passages with % acc min assist.  6.Pt and spouse will be trained in home reading programs and verbalize understanding for continued practice upon discharge.    SLP provided patient and sister with handout about support groups run through [facility] and [address] and provided HEP packet for memory exercises along with informational handout about the brain and memory strategies. Pain Assessment:  Initial Assessment:  Patient denies pain. Re-assessment:  Patient denies pain. Plan:  Continue with current goals    Patient/Caregiver Education:  Patient/Caregiver educated on session.   Patient/Caregiver provided with home program: SLP encouraged patient to continue any reading programs provided by SLP for HEP after d/c     Time in: 0830  Time out: 135 [address]  Minutes seen: 45    Signature:Electronically signed by ARRON Roach on 6/10/2021 at 9:58 AM

## 2023-05-12 NOTE — PROGRESS NOTES
Occupational Therapy  Daily Note     Name: Sergio Howard  : 1945  MRN: [id number]  Diagnosis:    L intracranial hemorrhage, R weakness, R neglect, R vision impairment    Visit Information:   Onset Date: 20  OT Insurance Information: Medicare  Total # of Visits to Date:   Progress Note Due Date: 21  Progress Note Counter: 3/10-12    Date: 2021  OT Therapeutic activities 60 minutes for 4 unit(s), CPT 81109       OT Individual Minutes  Time In: 1300  Time Out: 1400  Minutes: 61    Referring Practitioner: Dr. Matt Bro MD     Subjective:  \"I am allowed to have a Delorise Herndon now. \"     Pain rating:   Pre-treatment pain:    Pt denies pain    Action for pain:   No action necessary. Pain after treatment:      Pt denies pain         Focus of treatment was on the following:   coordination, fine motor/dexterity and strengthening  right      Objective:    Treatment Activity:       Used online digital and \"analog\" clock at [email or web address] . Pt with increased time to read \"analog\" clock with Mod verbal cues to correct mistakes. Pt with Min verbal cues with using the digital clock. Educated on use of all items on website. 2 lb resistive clip with connect 4 game. Pt with Min to Mod effort to secure game token with clip. Pt trying to push token on side against side of board. Pt with Min effort to align and drop onto slots. Pt stated therapist shirt making it difficult to see black pieces. Placed white paper in back of board for increased contrast.       Discussed previous HEP: yes, Pt verbally confirmed compliant with HEP's    Comments: Pt stated using hammer at home help with end range supination. Has some pull in elbow, but no sharp pain. Suggested using thermal modalities. Assessment:   Pt tolerated treatment fair. Pt with Mod difficulty reading clocks. Pt with increased effort to coordinate a manipulative during board game in non-traditional manner.      Plan: Continue POC    Goals:     Long term goals  Time Frame for Long term goals : 2 x/week for 12 weeks  Long term goal 1: Patient will be Supervised with all recommended HEP's, adaptive strategies, and adaptive techniques. Long term goal 2: Patient will increase R  strength from current by 10 lbs to increase performance with I/ADL's. Long term goal 3: Patient will increase R pinch strength from current by 5 lbs to increase performance with I/ADL's. Long term goal 4: Patient will increase dexterity in R hand as observed by 9 hole peg test by completing WFL to increase performance with I/ADL's. Long term goal 5: Patient will increase RUE coordination as observed by box and blocks by completing WFL to increase performance with I/ADL's. Long term goals 6: Patient will improve  RUE sensation and/or utilize compensatory techniques for safe completion of self-care as projected. Long term goal 7: Patient will scan environment to the right side without verbal cues to increase safety and performance with functional activities. Long term goal 8: Pt will complete clock drawing test for further assessment of visual perceptual skills. Long term goal 9: Pt will be IND using adaptive utensils to increase self-feeding skills.     LIAM Stover/L   1/28/2021  3:29 PM Yes